# Patient Record
Sex: MALE | Race: WHITE | NOT HISPANIC OR LATINO
[De-identification: names, ages, dates, MRNs, and addresses within clinical notes are randomized per-mention and may not be internally consistent; named-entity substitution may affect disease eponyms.]

---

## 2021-05-02 ENCOUNTER — TRANSCRIPTION ENCOUNTER (OUTPATIENT)
Age: 41
End: 2021-05-02

## 2021-05-02 ENCOUNTER — INPATIENT (INPATIENT)
Facility: HOSPITAL | Age: 41
LOS: 8 days | Discharge: HOME | DRG: 58 | End: 2021-05-11
Attending: INTERNAL MEDICINE | Admitting: HOSPITALIST
Payer: COMMERCIAL

## 2021-05-02 VITALS
DIASTOLIC BLOOD PRESSURE: 75 MMHG | SYSTOLIC BLOOD PRESSURE: 126 MMHG | RESPIRATION RATE: 16 BRPM | WEIGHT: 149.91 LBS | TEMPERATURE: 99 F | OXYGEN SATURATION: 100 % | HEART RATE: 80 BPM | HEIGHT: 70 IN

## 2021-05-02 DIAGNOSIS — D72.819 DECREASED WHITE BLOOD CELL COUNT, UNSPECIFIED: ICD-10-CM

## 2021-05-02 DIAGNOSIS — D64.9 ANEMIA, UNSPECIFIED: ICD-10-CM

## 2021-05-02 DIAGNOSIS — Z29.9 ENCOUNTER FOR PROPHYLACTIC MEASURES, UNSPECIFIED: ICD-10-CM

## 2021-05-02 DIAGNOSIS — H55.00 UNSPECIFIED NYSTAGMUS: ICD-10-CM

## 2021-05-02 DIAGNOSIS — R25.3 FASCICULATION: ICD-10-CM

## 2021-05-02 DIAGNOSIS — C80.1 MALIGNANT (PRIMARY) NEOPLASM, UNSPECIFIED: ICD-10-CM

## 2021-05-02 DIAGNOSIS — E83.119 HEMOCHROMATOSIS, UNSPECIFIED: ICD-10-CM

## 2021-05-02 DIAGNOSIS — R53.1 WEAKNESS: ICD-10-CM

## 2021-05-02 LAB
ALBUMIN SERPL ELPH-MCNC: 4.7 G/DL — SIGNIFICANT CHANGE UP (ref 3.3–5)
ALP SERPL-CCNC: 55 U/L — SIGNIFICANT CHANGE UP (ref 40–120)
ALT FLD-CCNC: 22 U/L — SIGNIFICANT CHANGE UP (ref 10–45)
AMPHET UR-MCNC: NEGATIVE — SIGNIFICANT CHANGE UP
ANION GAP SERPL CALC-SCNC: 11 MMOL/L — SIGNIFICANT CHANGE UP (ref 5–17)
APPEARANCE UR: CLEAR — SIGNIFICANT CHANGE UP
APTT 50/50 2HOUR INCUB: 35.6 SEC — SIGNIFICANT CHANGE UP (ref 27.5–36.5)
APTT BLD: 34.4 SEC — SIGNIFICANT CHANGE UP (ref 27.5–36.5)
APTT BLD: 42.1 SEC — HIGH (ref 27.5–35.5)
APTT BLD: 43 SEC — HIGH (ref 27.5–35.5)
APTT BLD: 43 SEC — HIGH (ref 27.5–35.5)
AST SERPL-CCNC: 19 U/L — SIGNIFICANT CHANGE UP (ref 10–40)
B-OH-BUTYR SERPL-SCNC: 0.1 MMOL/L — SIGNIFICANT CHANGE UP
BACTERIA # UR AUTO: NEGATIVE — SIGNIFICANT CHANGE UP
BARBITURATES UR SCN-MCNC: NEGATIVE — SIGNIFICANT CHANGE UP
BASOPHILS # BLD AUTO: 0.01 K/UL — SIGNIFICANT CHANGE UP (ref 0–0.2)
BASOPHILS NFR BLD AUTO: 0.3 % — SIGNIFICANT CHANGE UP (ref 0–2)
BENZODIAZ UR-MCNC: NEGATIVE — SIGNIFICANT CHANGE UP
BILIRUB SERPL-MCNC: 0.7 MG/DL — SIGNIFICANT CHANGE UP (ref 0.2–1.2)
BILIRUB UR-MCNC: NEGATIVE — SIGNIFICANT CHANGE UP
BLD GP AB SCN SERPL QL: NEGATIVE — SIGNIFICANT CHANGE UP
BUN SERPL-MCNC: 13 MG/DL — SIGNIFICANT CHANGE UP (ref 7–23)
CALCIUM SERPL-MCNC: 9.4 MG/DL — SIGNIFICANT CHANGE UP (ref 8.4–10.5)
CHLORIDE SERPL-SCNC: 105 MMOL/L — SIGNIFICANT CHANGE UP (ref 96–108)
CO2 SERPL-SCNC: 20 MMOL/L — LOW (ref 22–31)
COCAINE METAB.OTHER UR-MCNC: NEGATIVE — SIGNIFICANT CHANGE UP
COLOR SPEC: SIGNIFICANT CHANGE UP
CREAT SERPL-MCNC: 1.03 MG/DL — SIGNIFICANT CHANGE UP (ref 0.5–1.3)
DAT POLY-SP REAG RBC QL: NEGATIVE — SIGNIFICANT CHANGE UP
DIFF PNL FLD: NEGATIVE — SIGNIFICANT CHANGE UP
EOSINOPHIL # BLD AUTO: 0.06 K/UL — SIGNIFICANT CHANGE UP (ref 0–0.5)
EOSINOPHIL NFR BLD AUTO: 1.9 % — SIGNIFICANT CHANGE UP (ref 0–6)
EPI CELLS # UR: 0 /HPF — SIGNIFICANT CHANGE UP
FERRITIN SERPL-MCNC: 2 NG/ML — LOW (ref 30–400)
FIBRINOGEN PPP-MCNC: 366 MG/DL — SIGNIFICANT CHANGE UP (ref 290–520)
FOLATE SERPL-MCNC: 14.9 NG/ML — SIGNIFICANT CHANGE UP
GLUCOSE SERPL-MCNC: 98 MG/DL — SIGNIFICANT CHANGE UP (ref 70–99)
GLUCOSE UR QL: NEGATIVE — SIGNIFICANT CHANGE UP
HAPTOGLOB SERPL-MCNC: 61 MG/DL — SIGNIFICANT CHANGE UP (ref 34–200)
HCT VFR BLD CALC: 22.9 % — LOW (ref 39–50)
HCT VFR BLD CALC: 26.2 % — LOW (ref 39–50)
HGB BLD-MCNC: 6.1 G/DL — CRITICAL LOW (ref 13–17)
HGB BLD-MCNC: 7 G/DL — CRITICAL LOW (ref 13–17)
HIV 1 & 2 AB SERPL IA.RAPID: SIGNIFICANT CHANGE UP
HIV 1+2 AB+HIV1 P24 AG SERPL QL IA: SIGNIFICANT CHANGE UP
HYALINE CASTS # UR AUTO: 0 /LPF — SIGNIFICANT CHANGE UP (ref 0–2)
IMM GRANULOCYTES NFR BLD AUTO: 0.3 % — SIGNIFICANT CHANGE UP (ref 0–1.5)
INR BLD: 1.08 RATIO — SIGNIFICANT CHANGE UP (ref 0.88–1.16)
INR BLD: 1.11 RATIO — SIGNIFICANT CHANGE UP (ref 0.88–1.16)
IRON SATN MFR SERPL: 13 UG/DL — LOW (ref 45–165)
IRON SATN MFR SERPL: 3 % — LOW (ref 16–55)
KETONES UR-MCNC: NEGATIVE — SIGNIFICANT CHANGE UP
LDH SERPL L TO P-CCNC: 138 U/L — SIGNIFICANT CHANGE UP (ref 50–242)
LEUKOCYTE ESTERASE UR-ACNC: NEGATIVE — SIGNIFICANT CHANGE UP
LYMPHOCYTES # BLD AUTO: 0.73 K/UL — LOW (ref 1–3.3)
LYMPHOCYTES # BLD AUTO: 23.5 % — SIGNIFICANT CHANGE UP (ref 13–44)
MCHC RBC-ENTMCNC: 13.5 PG — LOW (ref 27–34)
MCHC RBC-ENTMCNC: 14.5 PG — LOW (ref 27–34)
MCHC RBC-ENTMCNC: 26.6 GM/DL — LOW (ref 32–36)
MCHC RBC-ENTMCNC: 26.7 GM/DL — LOW (ref 32–36)
MCV RBC AUTO: 50.7 FL — LOW (ref 80–100)
MCV RBC AUTO: 54.2 FL — LOW (ref 80–100)
METHADONE UR-MCNC: NEGATIVE — SIGNIFICANT CHANGE UP
MONOCYTES # BLD AUTO: 0.27 K/UL — SIGNIFICANT CHANGE UP (ref 0–0.9)
MONOCYTES NFR BLD AUTO: 8.7 % — SIGNIFICANT CHANGE UP (ref 2–14)
NEUTROPHILS # BLD AUTO: 2.03 K/UL — SIGNIFICANT CHANGE UP (ref 1.8–7.4)
NEUTROPHILS NFR BLD AUTO: 65.3 % — SIGNIFICANT CHANGE UP (ref 43–77)
NITRITE UR-MCNC: NEGATIVE — SIGNIFICANT CHANGE UP
NRBC # BLD: 0 /100 WBCS — SIGNIFICANT CHANGE UP (ref 0–0)
NRBC # BLD: 0 /100 WBCS — SIGNIFICANT CHANGE UP (ref 0–0)
OB PNL STL: POSITIVE
OPIATES UR-MCNC: NEGATIVE — SIGNIFICANT CHANGE UP
OXYCODONE UR-MCNC: NEGATIVE — SIGNIFICANT CHANGE UP
PAT CTL 2H: 35.3 SEC — SIGNIFICANT CHANGE UP (ref 27.5–36.5)
PCP SPEC-MCNC: SIGNIFICANT CHANGE UP
PCP UR-MCNC: NEGATIVE — SIGNIFICANT CHANGE UP
PH UR: 6.5 — SIGNIFICANT CHANGE UP (ref 5–8)
PLATELET # BLD AUTO: 110 K/UL — LOW (ref 150–400)
PLATELET # BLD AUTO: 237 K/UL — SIGNIFICANT CHANGE UP (ref 150–400)
POTASSIUM SERPL-MCNC: 3.7 MMOL/L — SIGNIFICANT CHANGE UP (ref 3.5–5.3)
POTASSIUM SERPL-SCNC: 3.7 MMOL/L — SIGNIFICANT CHANGE UP (ref 3.5–5.3)
PROT SERPL-MCNC: 7.3 G/DL — SIGNIFICANT CHANGE UP (ref 6–8.3)
PROT UR-MCNC: NEGATIVE — SIGNIFICANT CHANGE UP
PROTHROM AB SERPL-ACNC: 12.9 SEC — SIGNIFICANT CHANGE UP (ref 10.6–13.6)
PROTHROM AB SERPL-ACNC: 13.3 SEC — SIGNIFICANT CHANGE UP (ref 10.6–13.6)
PT 100%: 13.3 SEC — SIGNIFICANT CHANGE UP (ref 10.6–13.6)
PT 50/50: 12.2 SEC — SIGNIFICANT CHANGE UP (ref 10.6–14.7)
RBC # BLD: 4.21 M/UL — SIGNIFICANT CHANGE UP (ref 4.2–5.8)
RBC # BLD: 4.52 M/UL — SIGNIFICANT CHANGE UP (ref 4.2–5.8)
RBC # BLD: 4.83 M/UL — SIGNIFICANT CHANGE UP (ref 4.2–5.8)
RBC # FLD: 22.9 % — HIGH (ref 10.3–14.5)
RBC # FLD: 27.5 % — HIGH (ref 10.3–14.5)
RBC CASTS # UR COMP ASSIST: 1 /HPF — SIGNIFICANT CHANGE UP (ref 0–4)
RETICS #: 30.3 K/UL — SIGNIFICANT CHANGE UP (ref 25–125)
RETICS/RBC NFR: 0.7 % — SIGNIFICANT CHANGE UP (ref 0.5–2.5)
RH IG SCN BLD-IMP: POSITIVE — SIGNIFICANT CHANGE UP
SARS-COV-2 RNA SPEC QL NAA+PROBE: SIGNIFICANT CHANGE UP
SODIUM SERPL-SCNC: 136 MMOL/L — SIGNIFICANT CHANGE UP (ref 135–145)
SP GR SPEC: 1.01 — SIGNIFICANT CHANGE UP (ref 1.01–1.02)
THC UR QL: NEGATIVE — SIGNIFICANT CHANGE UP
THROMBIN TIME: 24.9 SEC — SIGNIFICANT CHANGE UP (ref 16–25)
TIBC SERPL-MCNC: 481 UG/DL — HIGH (ref 220–430)
TRANSFERRIN SERPL-MCNC: 349 MG/DL — SIGNIFICANT CHANGE UP (ref 200–360)
UIBC SERPL-MCNC: 468 UG/DL — HIGH (ref 110–370)
URATE SERPL-MCNC: 4.9 MG/DL — SIGNIFICANT CHANGE UP (ref 3.4–8.8)
UROBILINOGEN FLD QL: NEGATIVE — SIGNIFICANT CHANGE UP
VIT B12 SERPL-MCNC: 363 PG/ML — SIGNIFICANT CHANGE UP (ref 232–1245)
WBC # BLD: 3.11 K/UL — LOW (ref 3.8–10.5)
WBC # BLD: 4.09 K/UL — SIGNIFICANT CHANGE UP (ref 3.8–10.5)
WBC # FLD AUTO: 3.11 K/UL — LOW (ref 3.8–10.5)
WBC # FLD AUTO: 4.09 K/UL — SIGNIFICANT CHANGE UP (ref 3.8–10.5)
WBC UR QL: 0 /HPF — SIGNIFICANT CHANGE UP (ref 0–5)

## 2021-05-02 PROCEDURE — 99285 EMERGENCY DEPT VISIT HI MDM: CPT

## 2021-05-02 PROCEDURE — ZZZZZ: CPT | Mod: NC

## 2021-05-02 PROCEDURE — 83020 HEMOGLOBIN ELECTROPHORESIS: CPT | Mod: 26

## 2021-05-02 PROCEDURE — 70450 CT HEAD/BRAIN W/O DYE: CPT | Mod: 26,MD

## 2021-05-02 PROCEDURE — 93010 ELECTROCARDIOGRAM REPORT: CPT

## 2021-05-02 PROCEDURE — 99223 1ST HOSP IP/OBS HIGH 75: CPT | Mod: GC

## 2021-05-02 RX ORDER — POLYETHYLENE GLYCOL 3350 17 G/17G
17 POWDER, FOR SOLUTION ORAL DAILY
Refills: 0 | Status: DISCONTINUED | OUTPATIENT
Start: 2021-05-02 | End: 2021-05-11

## 2021-05-02 RX ORDER — ACETAMINOPHEN 500 MG
650 TABLET ORAL EVERY 6 HOURS
Refills: 0 | Status: DISCONTINUED | OUTPATIENT
Start: 2021-05-02 | End: 2021-05-11

## 2021-05-02 RX ORDER — PANTOPRAZOLE SODIUM 20 MG/1
40 TABLET, DELAYED RELEASE ORAL
Refills: 0 | Status: DISCONTINUED | OUTPATIENT
Start: 2021-05-02 | End: 2021-05-03

## 2021-05-02 RX ADMIN — PANTOPRAZOLE SODIUM 40 MILLIGRAM(S): 20 TABLET, DELAYED RELEASE ORAL at 15:22

## 2021-05-02 NOTE — CONSULT NOTE ADULT - SUBJECTIVE AND OBJECTIVE BOX
Hematology Consult Note    HPI: 40 yoM, PMHx of germinoma s/p chemo/radiation around 2000, presenting with new LLE weakness for 5 days. Never had this sx before. Sx constant, not worsening. Most notable with walking and hip flexion. Had mild residual foot drop on L from prior mass. No headache, seizures, NV, sensory changes or other sx. No trauma, fall, fever or IVDA. Has not seen neuro in 2 years.    Found to have H/H of 6.1/22.9 for which hematology was consulted.      Allergies    No Known Allergies    Intolerances        MEDICATIONS  (STANDING):    MEDICATIONS  (PRN):      PAST MEDICAL & SURGICAL HISTORY:  Germinoma        FAMILY HISTORY:      SOCIAL HISTORY: No EtOH, no tobacco    REVIEW OF SYSTEMS:    CONSTITUTIONAL: No weakness, fevers or chills  EYES/ENT: No visual changes;  No vertigo or throat pain   NECK: No pain or stiffness  RESPIRATORY: No cough, wheezing, hemoptysis; No shortness of breath  CARDIOVASCULAR: No chest pain or palpitations  GASTROINTESTINAL: No abdominal or epigastric pain. No nausea, vomiting, or hematemesis; No diarrhea or constipation. No melena or hematochezia.  GENITOURINARY: No dysuria, frequency or hematuria  NEUROLOGICAL: LLE weakness  SKIN: No itching, burning, rashes, or lesions   All other review of systems is negative unless indicated above.    Height (cm): 177.8 (05-02 @ 11:08)  Weight (kg): 68 (05-02 @ 11:08)  BMI (kg/m2): 21.5 (05-02 @ 11:08)  BSA (m2): 1.85 (05-02 @ 11:08)    T(F): 98.3 (05-02-21 @ 11:48), Max: 98.6 (05-02-21 @ 11:08)  HR: 73 (05-02-21 @ 11:48)  BP: 117/64 (05-02-21 @ 11:48)  RR: 17 (05-02-21 @ 11:48)  SpO2: 100% (05-02-21 @ 11:48)  Wt(kg): --    Physical Exam  GENERAL: NAD, well-developed  HEAD:  Atraumatic, Normocephalic  EYES: PERRLA, conjunctiva and sclera clear  NECK: Supple, No JVD  CHEST/LUNG: Clear to auscultation bilaterally; No wheeze  HEART: Regular rate and rhythm; No murmurs, rubs, or gallops  ABDOMEN: Soft, Nontender, Nondistended; Bowel sounds present  EXTREMITIES:  2+ Peripheral Pulses, No clubbing, cyanosis, or edema  NEUROLOGY: non-focal  SKIN: No rashes or lesions                          6.1    3.11  )-----------( 237      ( 02 May 2021 12:00 )             22.9       05-02    136  |  105  |  13  ----------------------------<  98  3.7   |  20<L>  |  1.03    Ca    9.4      02 May 2021 12:00    TPro  7.3  /  Alb  4.7  /  TBili  0.7  /  DBili  x   /  AST  19  /  ALT  22  /  AlkPhos  55  05-02

## 2021-05-02 NOTE — CONSULT NOTE ADULT - SUBJECTIVE AND OBJECTIVE BOX
HPI: 40 year old male, PMH brain germinoma s/p chemo/rtx 2000 w/ serial MR surveillance showing resolution of tumor (last MRI 2012 within our EMR) presenting for LLE weakness. Patient states that the weakness started 5 days ago and has been constant. No pain or numbness/tingling is associated. At baseline, patient has a L foot drop that causes him to drag that foot while ambulating, for which he compensates by putting more weight on the other leg. He denies falls or lower back pain. Does not endorse any HA's. For work, patient is a  for [employer] and is always on his feet. He denies any work-related trauma. He denies blurry vision, diplopia, trouble w/ speech/swallowing, focal numbness/tingling, incontinence. He was brought in by his sister. Of note, patient states he had similar LLE weakness when germinoma was first discovered.       REVIEW OF SYSTEMS  Per HPI    PAST MEDICAL & SURGICAL HISTORY:  Germinoma      FAMILY HISTORY:    SOCIAL HISTORY:   T/E/D:   Occupation:   Lives with:     MEDICATIONS (HOME):  Home Medications:    MEDICATIONS  (STANDING):    MEDICATIONS  (PRN):    ALLERGIES/INTOLERANCES:  Allergies  No Known Allergies    Intolerances    VITALS & EXAMINATION:  Vital Signs Last 24 Hrs  T(C): 36.8 (02 May 2021 11:48), Max: 37 (02 May 2021 11:08)  T(F): 98.3 (02 May 2021 11:48), Max: 98.6 (02 May 2021 11:08)  HR: 73 (02 May 2021 11:48) (73 - 80)  BP: 117/64 (02 May 2021 11:48) (117/64 - 126/75)  BP(mean): --  RR: 17 (02 May 2021 11:48) (16 - 17)  SpO2: 100% (02 May 2021 11:48) (100% - 100%)    General:  Constitutional: Male, appears stated age, in no apparent distress including pain  Head: Normocephalic & atraumatic.    Neurological (>12):  MS: Awake, alert, oriented to person, place, situation, time. Normal affect. Follows all commands.    Language: Speech is clear, fluent with good repetition & comprehension    CNs: PERRLA (R = 3mm, L = 3mm). VFF. EOMI no nystagmus, no diplopia. V1-3 intact to LT. No facial asymmetry b/l. Hearing grossly normal to conversation. Symmetric palate elevation in midline. Gag reflex deferred. Tongue midline, normal movements, no atrophy.    Motor: Normal muscle bulk & tone. No noticeable tremor or seizure. No pronator drift. Negative SLR.               Deltoid	Biceps	Triceps	   R	5	5	5	5	 	  L	5	5	5	5	    	H-Flex	H-ABd	H-ADd	K-Flex	K-Ext	D-Flex	P-Flex  R	5	5	5	5	5	5	5	 	   L	4	4+	4+	5	5	5	5		     Sensation: Intact to LT/PP/Position b/l throughout.     Cortical: Extinction on DSS (neglect): none    Reflexes:              Biceps(C5)       BR(C6)     Triceps(C7)               Patellar(L4)    Plantar Resp  R	2	          2	             2		        3		    Down   L	2	          2	             2		        3		    Down     Coordination: No dysmetria to FTN    Gait: Postural instability seen after 3-4 steps with L lean and L foot drag.     LABORATORY:  CBC                       6.1    3.11  )-----------( 237      ( 02 May 2021 12:00 )             22.9     Chem 05-02    136  |  105  |  13  ----------------------------<  98  3.7   |  20<L>  |  1.03    Ca    9.4      02 May 2021 12:00    TPro  7.3  /  Alb  4.7  /  TBili  0.7  /  DBili  x   /  AST  19  /  ALT  22  /  AlkPhos  55  05-02    LFTs LIVER FUNCTIONS - ( 02 May 2021 12:00 )  Alb: 4.7 g/dL / Pro: 7.3 g/dL / ALK PHOS: 55 U/L / ALT: 22 U/L / AST: 19 U/L / GGT: x           Coagulopathy PT/INR - ( 02 May 2021 12:00 )   PT: 12.9 sec;   INR: 1.08 ratio         PTT - ( 02 May 2021 12:00 )  PTT:42.1 sec  Lipid Panel   A1c   Cardiac enzymes     U/A   CSF  Immunological  Other    STUDIES & IMAGING:  Studies (EKG, EEG, EMG, etc):     Radiology (XR, CT, MR, U/S, TTE/DINAH):  CT head: Mild sulcal prominence suggesting mild volume loss which is nonspecific but may be related to chronic illness or certain medications. No masses. No change since 3/29/2012. HPI: 40 year old male, PMH brain germinoma s/p chemo/rtx 2000 w/ serial MR surveillance showing resolution of tumor (last MRI 2012 within our EMR) presenting for LLE weakness. Patient states that the weakness started 5 days ago and has been constant. No pain or numbness/tingling is associated. At baseline, patient has a L foot drop that causes him to drag that foot while ambulating, for which he compensates by putting more weight on the other leg. He denies falls or lower back pain. Does not endorse any HA's. For work, patient is a  for [employer] and is always on his feet. He denies any work-related trauma. He denies blurry vision, diplopia, trouble w/ speech/swallowing, focal numbness/tingling, incontinence. He was brought in by his sister. Of note, patient states he had similar LLE weakness when germinoma was first discovered.       REVIEW OF SYSTEMS  CONSTITUTIONAL:   HEENT:  No visual loss, blurred vision, double vision.  No hearing loss, sneezing, congestion, runny nose or sore throat.  SKIN:  No rash or itching.  CARDIOVASCULAR:  No chest pain, chest pressure or chest discomfort. No palpitations or edema.  RESPIRATORY:  No shortness of breath, cough or sputum.  GASTROINTESTINAL:  No anorexia, nausea, vomiting or diarrhea. No abdominal pain.  GENITOURINARY:  NO dysuria. No increased frequency. No retention. No incontinence.  NEUROLOGICAL:  See HPI  MUSCULOSKELETAL:  No muscle, back pain, joint pain or stiffness.  HEMATOLOGIC:  No anemia, bleeding or bruising.  PSYCHIATRIC:    ENDOCRINOLOGIC:  No reports of sweating, cold or heat intolerance. No polyuria or polydipsia.    PAST MEDICAL & SURGICAL HISTORY:  Germinoma      FAMILY HISTORY: unknown    SOCIAL HISTORY:   T/E/D: nonsmoker, no ETOH   Occupation: works with [employer]  Lives with:     MEDICATIONS (HOME):  Home Medications: None    MEDICATIONS  (STANDING): None    MEDICATIONS  (PRN):    ALLERGIES/INTOLERANCES:  Allergies  No Known Allergies    Intolerances    VITALS & EXAMINATION:  Vital Signs Last 24 Hrs  T(C): 36.8 (02 May 2021 11:48), Max: 37 (02 May 2021 11:08)  T(F): 98.3 (02 May 2021 11:48), Max: 98.6 (02 May 2021 11:08)  HR: 73 (02 May 2021 11:48) (73 - 80)  BP: 117/64 (02 May 2021 11:48) (117/64 - 126/75)  BP(mean): --  RR: 17 (02 May 2021 11:48) (16 - 17)  SpO2: 100% (02 May 2021 11:48) (100% - 100%)    General:  Constitutional: Male, appears stated age, in no apparent distress including pain  Head: Normocephalic & atraumatic.  ext: no edema    Neurological (>12):  MS: Awake, alert, oriented to person, place, situation, time. Normal affect. Follows all commands.    Language: Speech is clear, fluent with good repetition & comprehension    CNs: PERRLA (R = 3mm, L = 3mm). VFF. EOMI no nystagmus, no diplopia. V1-3 intact to LT. No facial asymmetry b/l. Hearing grossly normal to conversation. Symmetric palate elevation in midline. Gag reflex deferred. Tongue midline, normal movements, no atrophy.    Motor: Normal muscle bulk & tone. No noticeable tremor or seizure. No pronator drift. Negative SLR.               Deltoid	Biceps	Triceps	   R	5	5	5	5	 	  L	5	5	5	5	    	H-Flex	H-ABd	H-ADd	K-Flex	K-Ext	D-Flex	P-Flex  R	5	5	5	5	5	5	5	 	   L	4	4+	4+	5	5	4	5		     Sensation: Intact to LT/PP/Position b/l throughout.     Cortical: Extinction on DSS (neglect): none    Reflexes:              Biceps(C5)       BR(C6)     Triceps(C7)               Patellar(L4)     ankle    Plantar Resp  R	2	          2	             2		        2		      1            Down   L	2	          2	             2		        3		       2          Down     Coordination: No dysmetria to FTN    Gait: Postural instability seen after 3-4 steps with L lean and L foot drag.     LABORATORY:  CBC                       6.1    3.11  )-----------( 237      ( 02 May 2021 12:00 )             22.9     Chem 05-02    136  |  105  |  13  ----------------------------<  98  3.7   |  20<L>  |  1.03    Ca    9.4      02 May 2021 12:00    TPro  7.3  /  Alb  4.7  /  TBili  0.7  /  DBili  x   /  AST  19  /  ALT  22  /  AlkPhos  55  05-02    LFTs LIVER FUNCTIONS - ( 02 May 2021 12:00 )  Alb: 4.7 g/dL / Pro: 7.3 g/dL / ALK PHOS: 55 U/L / ALT: 22 U/L / AST: 19 U/L / GGT: x           Coagulopathy PT/INR - ( 02 May 2021 12:00 )   PT: 12.9 sec;   INR: 1.08 ratio         PTT - ( 02 May 2021 12:00 )  PTT:42.1 sec  Lipid Panel   A1c   Cardiac enzymes     U/A   CSF  Immunological  Other    STUDIES & IMAGING:  Studies (EKG, EEG, EMG, etc):     Radiology (XR, CT, MR, U/S, TTE/DINAH):  CT head: Mild sulcal prominence suggesting mild volume loss which is nonspecific but may be related to chronic illness or certain medications. No masses. No change since 3/29/2012.

## 2021-05-02 NOTE — H&P ADULT - PROBLEM SELECTOR PLAN 4
- hx hemochromatosis requiring monthly phlebotomies (confirmed with PCP Dr. Ponce) - last phlebotomy 3 months ago  plan  - f/u iron panel

## 2021-05-02 NOTE — CONSULT NOTE ADULT - SUBJECTIVE AND OBJECTIVE BOX
DATE OF SERVICE: 05-02-21 @ 20:37    CHIEF COMPLAINT:Patient is a 40y old  Male who presents with a chief complaint of     HISTORY OF PRESENT ILLNESS:HPI:  Mr. Duran is a 41 y/o man with hx germinoma (brain) s/p chemo and radiation 2000, hx hemochromatosis (confirmed with PCP), has not followed up with doctors in 3 years presenting for L leg weakness, found to have hb of 6.1.     For the last week, he has whole L leg weakness. he had a L foot drop from germinoma hx but this has worsened. He is able to walk with difficulty. No pain. No urinary or fecal incontinence. No weakness in R leg or arms. No fevers/chills. No trauma/injuries.   He endorses hx of hemochromatosis and required routine phlebotomy, last time phlebotomy was 3 years ago. Has not seen a doctor in 3 years (says it's due to pandemic). Pt states his hb was ~11 few years ago when it was last checked.   He has not noticed any dark colored stools or blood in stool. No prior colonoscopy or endoscopy. No bleeding in urine, no hemoptysis/hematemesis. No sob/chest pain/dizziness/overall weakness.  FH + for thallasemia in his mother (unsure about 2 sister and father). Mother never required blood transfusions per pt. Pt received blood transfusion during chemo, nothing since.   He used to see neuro outpt for germinoma follow up and last got imaging ~3 years ago at [facility] - pt believes nothing was acutely abnormal at this time.     ROS: as above, no HA, vision changes, no chest pain/sob, no abdominal pain. last bm 2 days ago, urinating without problems. no rash.     PMH: germinoma (tx with chemo and radiation in 8806-1149), hemachromatosis (phlebotomy).   Surgeries: none  Medications: NONE. No supplements.   NO smoking/alcohol/drugs per pt.   Lives at home alone, independent ADLs.    (02 May 2021 12:43)      PAST MEDICAL & SURGICAL HISTORY:  Germinoma    Hemochromatosis      MEDICATIONS:      acetaminophen   Tablet .. 650 milliGRAM(s) Oral every 6 hours PRN  pantoprazole  Injectable 40 milliGRAM(s) IV Push two times a day  polyethylene glycol 3350 17 Gram(s) Oral daily PRN      FAMILY HISTORY:      Non-contributory    SOCIAL HISTORY:    not a smoker    Allergies    No Known Allergies    Intolerances    	    REVIEW OF SYSTEMS:  CONSTITUTIONAL: No fever  EYES: No eye pain, visual disturbances, or discharge  ENMT:  No difficulty hearing, tinnitus  NECK: No pain or stiffness  RESPIRATORY: No cough, wheezing,  CARDIOVASCULAR: No chest pain, palpitations, passing out, dizziness, or leg swelling  GASTROINTESTINAL:  No nausea, vomiting, diarrhea or constipation. No melena.  GENITOURINARY: No dysuria, hematuria  NEUROLOGICAL: No stroke like symptoms  SKIN: No burning or lesions   ENDOCRINE: No heat or cold intolerance  MUSCULOSKELETAL: No joint pain or swelling, left foot pain  PSYCHIATRIC: No  anxiety, mood swings  HEME/LYMPH: No bleeding gums  ALLERGY AND IMMUNOLOGIC: No hives or eczema	    All other ROS negative    PHYSICAL EXAM:  T(C): 36.4 (05-02-21 @ 19:37), Max: 37 (05-02-21 @ 11:08)  HR: 66 (05-02-21 @ 19:37) (66 - 80)  BP: 114/70 (05-02-21 @ 19:37) (108/66 - 126/75)  RR: 18 (05-02-21 @ 19:37) (16 - 18)  SpO2: 100% (05-02-21 @ 19:37) (97% - 100%)  Wt(kg): --  I&O's Summary      Appearance: Normal	  HEENT:   Normal oral mucosa, EOMI	  Cardiovascular:  S1 S2, No JVD,    Respiratory: Lungs clear to auscultation	  Psychiatry: Alert  Gastrointestinal:  Soft, Non-tender, + BS	  Skin: No rashes   Neurologic: Non-focal  Extremities:  No edema  Vascular: Peripheral pulses palpable    	    	  	  CARDIAC MARKERS:  Labs personally reviewed by me                                  6.1    3.11  )-----------( 237      ( 02 May 2021 12:00 )             22.9     05-02    136  |  105  |  13  ----------------------------<  98  3.7   |  20<L>  |  1.03    Ca    9.4      02 May 2021 12:00    TPro  7.3  /  Alb  4.7  /  TBili  0.7  /  DBili  x   /  AST  19  /  ALT  22  /  AlkPhos  55  05-02          EKG: Personally reviewed by me -  none in chart      Assessment /Plan:   Assessment:  · Assessment	  Mr. Duran is a 41 y/o man with hx germinoma (brain) s/p chemo and radiation 2000, hx hemochromatosis requiring phlebotomies, has not followed up with doctors in 3 years presenting for L leg weakness/worsening foot drop pending MRI brain/spine to r/o cord compression/recurrence of germinoma, found to be microcytic anemic hb 6.1 s/p 1U PRBC 5/2 - iron panel concerning for GI malignancy/bleed vs myelodysplastic syndrome vs thalassemia (+ family hx).     FULL CODE  neuro exam - A&O x3, responds to all commands. +nystagmus, tongue fasciculations L foot drop, mild LLE weakness.     Problem/Plan - 1:   ·  Problem: SOB.  Plan: Overall weakness and SOB 2/2 significant anemia of 6.1  - Transfuse PRBC to keep hb >7  - obtain EKG    Problem/Plan - 2:  ·  Problem: Anemia.  Plan: - admitted with hb 6.1  - clarified hemochromatosis Hx with PCP, never had anemia prior from it  - on the contrary hx hemochromatosis requiring phlebotomy every month for few years, last done ~3 years ago per pt.   - etiology of present anemia: denies melena/hematoschezia, no hemoptysis/hematemesis, no hematuria. He is FOBT positive  - ? chronic slow GI blood loss anemia over   - Heme consult appreciated  - GI consult pending   - f/u hemoglobin electropheresis  - f/u iron panel  - f/u haptoglobin/LDH/B12/folate    Problem/Plan - 3:  ·  Problem: Germinoma.  Plan: - hx germinoma (brain) dx in 2000 s/p chemo/radiation in [id number]. dx when he presented with L foot drop/weakness.   - Heme/onc follow up   - MRI brain as above   - heme onc on board - f/u additional recs.     Problem/Plan - 4:  ·  Problem: Hemochromatosis.  Plan: - hx hemochromatosis requiring monthly phlebotomies (confirmed with PCP Dr. Ponce) - last phlebotomy 3 months ago  plan  - f/u iron panel.     Problem/Plan - 5:  ·  Problem: Leukopenia.  Plan: leukopenia with wbc 3.11. diff wnl.   - possible bone marrow suppression vs malignancy. afebrile, no infection sx including congestion/cough/fevers/chills/diarrhea. less likely infection.   plan  - CTM daily CBC  - f/u heme onc recs.     Problem/Plan - 6:  Problem: Fasciculation of tongue. Plan: - fasciulation of tongue observed on exam  - etiology: benign, ALS.    Problem/Plan - 7:  ·  Problem: Nystagmus.  Plan: - horizontal nystagmus with lateral eye movements - pt states has had before, is not sure cause.   - etiology: cerebellum lesion though no dysmetria and no dyskinesia.     Problem/Plan - 8:  ·  Problem: Prophylactic measure.  Plan: DVT ppx - SCD pending heme studies  diet - regular.         Differential diagnosis and plan of care discussed with patient after the evaluation. Counseling on diet, nutritional counseling, weight management, exercise and medication compliance was done. OMT on six regions for acute somatic dysfunctions done at the bedside. One hundred ten minutes spent on total encounter, of which more than fifty percent of the encounter was spent counseling and/or coordinating care by the attending physician        Alejandro Smith DO [facility]  Cardiovascular Medicine  [address] [place] [address] Dr, [address]  Office [phone]  Cell [phone]

## 2021-05-02 NOTE — H&P ADULT - NSHPPHYSICALEXAM_GEN_ALL_CORE
PHYSICAL EXAM: NOT COMPLETED YET.   GENERAL: NAD, lying in bed comfortably  HEAD:  Atraumatic, Normocephalic  EYES: EOMI, PERRLA, conjunctiva and sclera clear  ENT: Moist mucous membranes  NECK: Supple, No JVD  CHEST/LUNG: Clear to auscultation bilaterally; No rales, rhonchi, wheezing, or rubs. Unlabored respirations  HEART: Regular rate and rhythm; No murmurs, rubs, or gallops  ABDOMEN: Bowel sounds present; Soft, Nontender, Nondistended. No hepatomegally  EXTREMITIES:  2+ Peripheral Pulses, brisk capillary refill. No clubbing, cyanosis, or edema  NERVOUS SYSTEM:  Alert & Oriented X3, speech clear. No deficits   MSK: FROM all 4 extremities, full and equal strength  SKIN: No rashes or lesions GENERAL: lying down, appears comfortable in no distress  HEAD:  Atraumatic, Normocephalic  EYES: extra ocular movements intact, horizontal nystagmus on lateral gaze  ENT: Moist mucous membranes  NECK: Supple  CHEST/LUNG: clear to auscultation bilaterally, no crackles/wheeze/ronchi  HEART: regular rate and rhythm, no murmurs  ABDOMEN: nontender abdomen, no masses palpated. no rebound/guarding.   EXTREMITIES:  no edema. able to move all extremities. no rashes.   NERVOUS SYSTEM: A&O x3, CN2-12 - horizontal nystagmus with lateral gaze bilaterally but EOMI , fasciculations in tongue +, otherwise CN2-12 wnl. 5/5  str bilateral UE. no dysmetria, no dyskinesia. no tremors at rest or action.   - GAIT: prominent L foot drop, dragging L foot with each step.   MSK: FROM all 4 extremities, full and equal strength  SKIN: No rashes or lesions GENERAL: lying down, appears comfortable in no distress  HEAD:  Atraumatic, Normocephalic  EYES: extra ocular movements intact, horizontal nystagmus on lateral gaze  ENT: Moist mucous membranes  NECK: Supple  CHEST/LUNG: clear to auscultation bilaterally, no crackles/wheeze/ronchi  HEART: regular rate and rhythm, no murmurs  ABDOMEN: nontender abdomen, no masses palpated. no rebound/guarding.   EXTREMITIES:  no edema. able to move all extremities. no rashes.   NERVOUS SYSTEM: A&O x3, CN2-12 - horizontal nystagmus with lateral gaze bilaterally but EOMI , fasciculations in tongue +, otherwise CN2-12 wnl. 5/5  str bilateral UE. no dysmetria, no dyskinesia. no tremors at rest or action. no pronator drift, neg romberg.   - GAIT: prominent L foot drop, dragging L foot with each step.   MSK: FROM all 4 extremities, full and equal strength  SKIN: No rashes or lesions

## 2021-05-02 NOTE — ED PROVIDER NOTE - CLINICAL SUMMARY MEDICAL DECISION MAKING FREE TEXT BOX
Gildardo, PGY3- new LLE weakness for 5 days in setting of remote treated germinoma. S/p chemo/radiation 2000. No other sx. No trauma, fall, pain, fever, or IVDA. Has not seen neuro in 2 years.   Notable weakness LLE with hip flexion, unsteady gait.  Concern for recurrence of mass. No consistent with CVA. Could be spinal cord pathology but no RF for epidural abscess/hematoma or pain to suggest disc disease/fx.   Will obtain CTH and neuro consult. Likely need MRI/further workup. Gildardo, PGY3- new LLE weakness for 5 days in setting of remote treated germinoma. S/p chemo/radiation 2000. No other sx. No trauma, fall, pain, fever, or IVDA. Has not seen neuro in 2 years.   Notable weakness LLE with hip flexion, unsteady gait.  Concern for recurrence of mass. Not consistent with CVA. Could be spinal cord pathology but no RF for epidural abscess/hematoma or pain to suggest disc disease/fx.   Will obtain CTH and neuro consult. Likely need MRI/further workup.

## 2021-05-02 NOTE — H&P ADULT - PROBLEM SELECTOR PLAN 1
- admitted with hb 6.1 (f/u baseline from PCP), MCV 50.7. RDW 22.9. smear with moderate hypochromia. PTT elevated to 42.1  - bleeding source: - admitted with hb 6.1 (f/u baseline from PCP), MCV 50.7. RDW 22.9. smear with moderate hypochromia, slight target cells but heme/onc to re-examine smear. PTT elevated to 42.1.   - per pt, mother with hemochromatosis but never needed blood transfusion. [place] heritage. pt required blood transfusions during chemo. hx hemochromatosis requiring phlebotomy every month for few years (confirmed with PCP), last done ~3 years ago per pt.   - etiology of present anemia: no bleeding reported by pt (no melena/hematoschezia, no hemoptysis/hematemesis, no hematuria). severe microcytic anemia concerning for thalassemia though anemia is new (no records available per PCP given pt has not followed up in few years). Elevated RDW possible iron deficiency but pt has hx hemochromatosis. Possible bone marrow suppression. Possible RP hemorrhage - if other labs suggestive for bleeding rather than thallasemia then will consider obtaining CT abdomen to r/o RP hemorrhage.   plan  - f/u hemoglobin electropheresis  - f/u iron panel  - f/u haptoglobin/LDH/B12/folate  - f/u HIV - admitted with hb 6.1 (f/u baseline from PCP), MCV 50.7. RDW 22.9. smear with moderate hypochromia, slight target cells but heme/onc to re-examine smear. PTT elevated to 42.1.   - per pt, mother with hemochromatosis but never needed blood transfusion. [place] heritage. pt required blood transfusions during chemo. hx hemochromatosis requiring phlebotomy every month for few years (confirmed with PCP), last done ~3 years ago per pt.   - etiology of present anemia: no bleeding reported by pt (no melena/hematoschezia, no hemoptysis/hematemesis, no hematuria). severe microcytic anemia concerning for thalassemia though anemia is new (no records available per PCP given pt has not followed up in few years). Elevated RDW possible iron deficiency but pt has hx hemochromatosis. Possible bone marrow suppression. Possible RP hemorrhage - if other labs suggestive for bleeding rather than thallasemia then will consider obtaining CT abdomen to r/o RP hemorrhage.   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 - admitted with hb 6.1 (f/u baseline from PCP), MCV 50.7. RDW 22.9. smear with moderate hypochromia, slight target cells but heme/onc to re-examine smear. PTT elevated to 42.1.   - per pt, mother with thalassemia but never needed blood transfusion. Urdu heritage. pt required blood transfusions during chemo. hx hemochromatosis requiring phlebotomy every month for few years (confirmed with PCP), last done ~3 years ago per pt.   - etiology of present anemia: no bleeding reported by pt (no melena/hematoschezia, no hemoptysis/hematemesis, no hematuria). severe microcytic anemia concerning for thalassemia though anemia is new (no records available per PCP given pt has not followed up in few years). Elevated RDW possible iron deficiency but pt has hx hemochromatosis. Possible bone marrow suppression. Possible RP hemorrhage - if other labs suggestive for bleeding rather than thallasemia then will consider obtaining CT abdomen to r/o RP hemorrhage.   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

## 2021-05-02 NOTE — ED PROVIDER NOTE - PROGRESS NOTE DETAILS
Gildardo, PGY3- call with Dr. Ponce. Agree with [facility], neuro consult. Gildardo, PGY3- seen by neuro. CTH without obvious lesion. Found to be anemic to 6. Hx of thalassemia in family, baseline possible 11? Additional labs sent. Heme to see. FOBT sent (patricia TURNER RN). Admitted to Dr. Giraldo. Will hold off on PRBC at this time. Dr. Ponce aware of findings.

## 2021-05-02 NOTE — H&P ADULT - ASSESSMENT
-  Mr. Duran is a 41 y/o man with hx germinoma (brain) s/p chemo and radiation 2000, hx hemochromatosis requiring phlebotomies, has not followed up with doctors in 3 years presenting for L leg weakness/worsening foot drop pending MRI brain/spine to r/o cord compression/recurrence of germinoma, found to be microcytic anemic hb 6.1 s/p 1U PRBC 5/2 - iron panel concerning for GI malignancy/bleed vs myelodysplastic syndrome vs thalassemia (+ family hx).     FULL CODE  neuro exam - A&O x3, responds to all commands. +nystagmus, tongue fasciculations L foot drop, mild LLE weakness.

## 2021-05-02 NOTE — ED PROVIDER NOTE - OBJECTIVE STATEMENT
40 yoM, PMHx of germinoma s/p chemo/radiation around 2000, presenting with new LLE weakness for 5 days. Never had this sx before. Sx constant, not worsening. Most notable with walking and hip flexion. Had mild residual foot drop on L from prior mass. No headache, seizures, NV, sensory changes or other sx. No trauma, fall, fever or IVDA. Has not seen neuro in 2 years. PCP: Kris.

## 2021-05-02 NOTE — H&P ADULT - PROBLEM SELECTOR PLAN 8
DVT pxx - SCD pending heme studies  diet - regular DVT ppx - SCD pending heme studies  diet - regular

## 2021-05-02 NOTE — ED ADULT NURSE REASSESSMENT NOTE - NS ED NURSE REASSESS COMMENT FT1
Pt. hemoglobin 6.1. Pt. denies weakness, dizziness, lightheadedness, melena. Guaiac performed at bedside by MD Ewing with RN present. Safety and comfort maintained.

## 2021-05-02 NOTE — H&P ADULT - ATTENDING COMMENTS
40 year old male with PMH brain germinoma s/p chemotherapy and radiation in 2000 (tumor has since resolved) and hemochromatosis (no phlebotomy for last 3 years) who presents for L leg weakness. The patient states that since his germinoma, he has had some L foot drop at baseline but noticed that for the past week, it has worsened. He feels as though he is dragging his foot more. He had a CT head in the ER which showed no acute abnormality and neurology was consulted-final recommendations are pending. Additionally, he was found to have a new microcytic anemia with hemoglobin 6.1 and MCV 50.7. He does endorse a family history of thalassemia but states that his baseline hemoglobin is around 12. His FOBT also came back positive although he denies any overt signs of bleeding. Heme/onc was consulted and is recommending further labwork such as iron studies, hemolysis labs, etc which are pending. He will be transfused 1 unit PRBC and post transfusion CBC will be drawn. Additionally, due to his positive FOBT, GI will be consulted. Rest of plan as above.    Ray Convissar, DO  Pager [phone]  If no answer [phone]

## 2021-05-02 NOTE — ED PROVIDER NOTE - PHYSICAL EXAMINATION
General: alert, conversant, looks well, vitals reassuring  Head: atraumatic, normocephalic  Eyes: PERRL, EOMI, no scleral icterus  ENT: no epistaxis, normal phonation, airway patent  Neck: full ROM, no midline ttp  CV: RRR, no murmurs, HDS  Pulm: lungs CTA b/l, no wheezing, no respiratory distress  GI: abd soft, non tender, no guarding/rebound/masses  Back: normal ROM, no midline ttp, no signs of trauma  Extremities: normal ROM, joints stable, distal pulses intact, no edema  Neuro: awake, alert, PERRL, 4/5 strength on LLE with hip flexion, no sensory changes. Normal strength RLE/BUE. No speech changes. No facial droop. PERRL. Gait unsteady.   Derm: warm, dry, normal color, no rash/wounds

## 2021-05-02 NOTE — H&P ADULT - NSHPLABSRESULTS_GEN_ALL_CORE
LABS:                        6.1    3.11  )-----------( 237      ( 02 May 2021 12:00 )             22.9     02 May 2021 12:00    136    |  105    |  13     ----------------------------<  98     3.7     |  20     |  1.03     Ca    9.4        02 May 2021 12:00    TPro  7.3    /  Alb  4.7    /  TBili  0.7    /  DBili  x      /  AST  19     /  ALT  22     /  AlkPhos  55     02 May 2021 12:00    PT/INR - ( 02 May 2021 12:00 )   PT: 12.9 sec;   INR: 1.08 ratio         PTT - ( 02 May 2021 12:00 )  PTT:42.1 sec  CAPILLARY BLOOD GLUCOSE        BLOOD CULTURE    RADIOLOGY & ADDITIONAL TESTS:    Imaging Personally Reviewed:  [ ] YES

## 2021-05-02 NOTE — H&P ADULT - PROBLEM SELECTOR PLAN 5
leukopenia with wbc 3.11. diff wnl.   - possible bone marrow suppression vs malignancy. afebrile, no infection sx including congestion/cough/fevers/chills/diarrhea. less likely infection.   plan  - CTM daily CBC  - f/u heme onc recs

## 2021-05-02 NOTE — ED ADULT NURSE NOTE - OBJECTIVE STATEMENT
40 y.o M A&Ox3 M A&Ox3 with PMH germinoma s/p chemo/radiation presents to the ED c/o sudden onset of LLE weakness x5 days. Pt. reports symptoms presented about 5 days ago, denies any recent traumas or injuries. Pt. reports he has been having difficulty ambulating due to weakness. LLE weaker to RLE. Denies LUE weakness. Denies numbness/tingling sensation, HA, back pain, vision changes, fevers, chills, dizziness, swelling/redness, calf pain at this time. Otherwise gross neuro intact. PERRL- 2 mm bilaterally. Pt. is speaking in full coherent sentences and following commands without difficulty. Safety and comfort provided.

## 2021-05-02 NOTE — CONSULT NOTE ADULT - ATTENDING COMMENTS
41 y/o man h/o brain germinoma s/p resection with chronic residual left foot drop, p/w 5 days of leg weakness and difficulty bearing weight on the left side. On exam pt has left HF weakness, DF weakness which is c/w with pt's hx. KF/KE, abd/add, inversion/eversion all 5/5. Py describes numbness over the top of the left foot, but on objective exam there is no sensory deficits. Asymmetric reflexes of patellar and ankles L>R.     R/o lumbar radiculopathy, pending MR lumbar spine.

## 2021-05-02 NOTE — H&P ADULT - PROBLEM SELECTOR PLAN 7
- horizontal nystagmus with lateral eye movements - pt states has had before, is not sure cause.   - etiology: - horizontal nystagmus with lateral eye movements - pt states has had before, is not sure cause.   - etiology: cerebellum lesion though no dysmetria and no dyskinesia.

## 2021-05-02 NOTE — H&P ADULT - HISTORY OF PRESENT ILLNESS
Mr. Duran is a 39 y/o man with hx germinoma  Mr. Duran is a 41 y/o man with hx germinoma (brain) s/p chemo and radiation 2010, reported hx hemochromatosis with blood taken out routinely (last 3 years ago), has not followed up with doctors in 3 years presenting for L leg weakness. For the last week, he has whole L leg weakness. he had a L foot drop from prior germinoma but has worsened.  Mr. Duran is a 41 y/o man with hx germinoma (brain) s/p chemo and radiation 2010, reported hx hemochromatosis with blood taken out routinely (last 3 years ago), has not followed up with doctors in 3 years presenting for L leg weakness. For the last week, he has whole L leg weakness. he had a L foot drop from prior germinoma but has worsened. No urinary or fecal incontinence. No weakness in R leg. No fevers/chills.   He endorses hx of hemochromatosis and required blood removed routinely in past, last time blood was removed Has not seen a doctor in 3 years (says it's due to pandemic).  Mr. Duran is a 39 y/o man with hx germinoma (brain) s/p chemo and radiation 2000, reported hx hemochromatosis with blood taken out routinely (last 3 years ago), has not followed up with doctors in 3 years presenting for L leg weakness. For the last week, he has whole L leg weakness. he had a L foot drop from prior germinoma but has worsened. No urinary or fecal incontinence. No weakness in R leg. No fevers/chills.   He endorses hx of hemochromatosis and required blood removed routinely in past, last time blood was removed Has not seen a doctor in 3 years (says it's due to pandemic). Pt states his hb was ~11 few years ago when it was last checked.   He has not noticed any dark colored stools or blood in stool. No bleeding in urine, no hemoptysis/hematemesis. No trauma/injuries. No sob/chest pain/dizziness/overall weakness.  FH + for thallasemia in his mother (unsure about 2 sister and father). Mother never required blood transfusions per pt. Pt received blood transfusion during chemo, nothing since.   He used to see neuro outpt for germinoma follow up and last got imaging ~3 years ago at [facility] - pt believes nothing was acutely abnormal at this time.     PMH: germinoma (tx with chemo and radiation in 3784-5249), hemachromatosis.   Surgeries: none  Medications: NONE. No supplements.   NO smoking/alcohol/drugs per pt.   Lives at home alone, independent ADLs.    Mr. Duran is a 39 y/o man with hx germinoma (brain) s/p chemo and radiation 2000, reported hx hemochromatosis with blood taken out routinely (last 3 years ago), has not followed up with doctors in 3 years presenting for L leg weakness found to have hb of 6.1. For the last week, he has whole L leg weakness. he had a L foot drop from prior germinoma but has worsened. He is able to walk with difficulty. No pain. No urinary or fecal incontinence. No weakness in R leg. No fevers/chills.   He endorses hx of hemochromatosis and required phlebotomy, last time phlebotomy was 3 years ago. Has not seen a doctor in 3 years (says it's due to pandemic). Pt states his hb was ~11 few years ago when it was last checked.   He has not noticed any dark colored stools or blood in stool. No prior colonoscopy or endoscopy. No bleeding in urine, no hemoptysis/hematemesis. No trauma/injuries. No sob/chest pain/dizziness/overall weakness.  FH + for thallasemia in his mother (unsure about 2 sister and father). Mother never required blood transfusions per pt. Pt received blood transfusion during chemo, nothing since.   He used to see neuro outpt for germinoma follow up and last got imaging ~3 years ago at [facility] - pt believes nothing was acutely abnormal at this time.     PMH: germinoma (tx with chemo and radiation in 4371-5300), hemachromatosis (phlebotomy).   Surgeries: none  Medications: NONE. No supplements.   NO smoking/alcohol/drugs per pt.   Lives at home alone, independent ADLs.    Mr. Duran is a 39 y/o man with hx germinoma (brain) s/p chemo and radiation 2000, reported hx hemochromatosis, has not followed up with doctors in 3 years presenting for L leg weakness, found to have hb of 6.1.     For the last week, he has whole L leg weakness. he had a L foot drop from germinoma hx but this has worsened. He is able to walk with difficulty. No pain. No urinary or fecal incontinence. No weakness in R leg or arms. No fevers/chills. No trauma/injuries.   He endorses hx of hemochromatosis and required routine phlebotomy, last time phlebotomy was 3 years ago. Has not seen a doctor in 3 years (says it's due to pandemic). Pt states his hb was ~11 few years ago when it was last checked.   He has not noticed any dark colored stools or blood in stool. No prior colonoscopy or endoscopy. No bleeding in urine, no hemoptysis/hematemesis. No sob/chest pain/dizziness/overall weakness.  FH + for thallasemia in his mother (unsure about 2 sister and father). Mother never required blood transfusions per pt. Pt received blood transfusion during chemo, nothing since.   He used to see neuro outpt for germinoma follow up and last got imaging ~3 years ago at [facility] - pt believes nothing was acutely abnormal at this time.     ROS: as above, no HA, vision changes,     PMH: germinoma (tx with chemo and radiation in 1098-7274), hemachromatosis (phlebotomy).   Surgeries: none  Medications: NONE. No supplements.   NO smoking/alcohol/drugs per pt.   Lives at home alone, independent ADLs.    Mr. Duran is a 39 y/o man with hx germinoma (brain) s/p chemo and radiation 2000, reported hx hemochromatosis, has not followed up with doctors in 3 years presenting for L leg weakness, found to have hb of 6.1.     For the last week, he has whole L leg weakness. he had a L foot drop from germinoma hx but this has worsened. He is able to walk with difficulty. No pain. No urinary or fecal incontinence. No weakness in R leg or arms. No fevers/chills. No trauma/injuries.   He endorses hx of hemochromatosis and required routine phlebotomy, last time phlebotomy was 3 years ago. Has not seen a doctor in 3 years (says it's due to pandemic). Pt states his hb was ~11 few years ago when it was last checked.   He has not noticed any dark colored stools or blood in stool. No prior colonoscopy or endoscopy. No bleeding in urine, no hemoptysis/hematemesis. No sob/chest pain/dizziness/overall weakness.  FH + for thallasemia in his mother (unsure about 2 sister and father). Mother never required blood transfusions per pt. Pt received blood transfusion during chemo, nothing since.   He used to see neuro outpt for germinoma follow up and last got imaging ~3 years ago at [facility] - pt believes nothing was acutely abnormal at this time.     ROS: as above, no HA, vision changes, no chest pain/sob, no abdominal pain. last bm 2 days ago, urinating without problems. no rash.     PMH: germinoma (tx with chemo and radiation in [id number]), hemachromatosis (phlebotomy).   Surgeries: none  Medications: NONE. No supplements.   NO smoking/alcohol/drugs per pt.   Lives at home alone, independent ADLs.    Mr. Duran is a 41 y/o man with hx germinoma (brain) s/p chemo and radiation 2000, hx hemochromatosis (confirmed with PCP), has not followed up with doctors in 3 years presenting for L leg weakness, found to have hb of 6.1.     For the last week, he has whole L leg weakness. he had a L foot drop from germinoma hx but this has worsened. He is able to walk with difficulty. No pain. No urinary or fecal incontinence. No weakness in R leg or arms. No fevers/chills. No trauma/injuries.   He endorses hx of hemochromatosis and required routine phlebotomy, last time phlebotomy was 3 years ago. Has not seen a doctor in 3 years (says it's due to pandemic). Pt states his hb was ~11 few years ago when it was last checked.   He has not noticed any dark colored stools or blood in stool. No prior colonoscopy or endoscopy. No bleeding in urine, no hemoptysis/hematemesis. No sob/chest pain/dizziness/overall weakness.  FH + for thallasemia in his mother (unsure about 2 sister and father). Mother never required blood transfusions per pt. Pt received blood transfusion during chemo, nothing since.   He used to see neuro outpt for germinoma follow up and last got imaging ~3 years ago at [facility] - pt believes nothing was acutely abnormal at this time.     ROS: as above, no HA, vision changes, no chest pain/sob, no abdominal pain. last bm 2 days ago, urinating without problems. no rash.     PMH: germinoma (tx with chemo and radiation in [id number]), hemachromatosis (phlebotomy).   Surgeries: none  Medications: NONE. No supplements.   NO smoking/alcohol/drugs per pt.   Lives at home alone, independent ADLs.

## 2021-05-02 NOTE — H&P ADULT - PROBLEM SELECTOR PLAN 2
- 1wk of L leg weakness with worse foot drop and gait difficulty. foot drop present on gait exam. pt had L foot drop since germinoma in 2000 and had high steppage but over last week does not have strength for this and dragging L foot.    - concerning for cord compression in setting of germinoma with lost to follow up for few years without imaging. no incontinence, weakness of R leg.   plan  - f/u MRI lumbar spine. f/u MRI brain for recurrence of germinoma/ other lesions. - 1wk of L leg weakness with worse foot drop and gait difficulty. foot drop present on gait exam. pt had L foot drop since germinoma in 2000 and had high steppage but over last week does not have strength for this and dragging L foot.    - concerning for cord compression in setting of germinoma with lost to follow up for few years without imaging. no incontinence, weakness of R leg.   plan  - f/u MRI lumbar spine. f/u MRI brain for recurrence of germinoma/ other lesions.  - neuro was consulted in ED - f/u. - 1wk of L leg weakness with worse foot drop and gait difficulty. foot drop present on gait exam. pt had L foot drop since germinoma in 2000 and had high steppage but over last week does not have strength for this and dragging L foot.    - concerning for cord compression in setting of germinoma with lost to follow up for few years without imaging. no incontinence, weakness of R leg. possible ALS.   plan  - f/u MRI lumbar spine. f/u MRI brain for recurrence of germinoma/ other lesions.  - neuro was consulted in ED - f/u.

## 2021-05-02 NOTE — H&P ADULT - PROBLEM SELECTOR PLAN 6
- fasciulation of tongue observed on exam  - etiology: ALS - fasciulation of tongue observed on exam  - etiology: benign, ALS

## 2021-05-02 NOTE — PATIENT PROFILE ADULT - FLU SEASON?
INTERVAL HPI/OVERNIGHT EVENTS/SUBJECTIVE: 87yo female examined at bedside, pt was code stroke overnight, nurse stated that pt was aphasic as well as not following commands. Pt is unable to communicate at this time, appears to be comfortable NAD. Pt is now NPO as per code stroke protocol. Dr Carrera Neurology is following.     ICU Vital Signs Last 24 Hrs  T(C): 36.8 (02 Dec 2017 07:57), Max: 37.7 (01 Dec 2017 20:49)  T(F): 98.3 (02 Dec 2017 07:57), Max: 99.9 (01 Dec 2017 20:49)  HR: 112 (02 Dec 2017 08:19) (80 - 119)  BP: 145/95 (02 Dec 2017 07:21) (136/62 - 159/75)  BP(mean): 81 (01 Dec 2017 12:54) (81 - 81)  ABP: --  ABP(mean): --  RR: 24 (02 Dec 2017 07:21) (22 - 28)  SpO2: 100% (02 Dec 2017 08:19) (93% - 100%)        MEDICATIONS  (STANDING):  ALBUTerol/ipratropium for Nebulization 3 milliLiter(s) Nebulizer every 6 hours  alvimopan 12 milliGRAM(s) Oral two times a day  amylase/lipase/protease  (CREON  6,000 Units) 1 Capsule(s) Oral three times a day with meals  ascorbic acid 500 milliGRAM(s) Oral daily  calcium carbonate 1250 mG + Vitamin D (OsCal 500 + D) 1 Tablet(s) Oral two times a day  dextrose 5%. 1000 milliLiter(s) (50 mL/Hr) IV Continuous <Continuous>  dextrose 50% Injectable 12.5 Gram(s) IV Push once  dextrose 50% Injectable 25 Gram(s) IV Push once  dextrose 50% Injectable 25 Gram(s) IV Push once  heparin  Infusion.  Unit(s)/Hr (7 mL/Hr) IV Continuous <Continuous>  insulin lispro (HumaLOG) corrective regimen sliding scale   SubCutaneous every 6 hours  lidocaine   Patch 1 Patch Transdermal daily  LORazepam   Injectable 0.5 milliGRAM(s) IntraMuscular once  methimazole 2.5 milliGRAM(s) Oral <User Schedule>  mirtazapine 15 milliGRAM(s) Oral at bedtime  sodium chloride 0.9%. 1000 milliLiter(s) (60 mL/Hr) IV Continuous <Continuous>    MEDICATIONS  (PRN):  ALBUTerol    0.083% 2.5 milliGRAM(s) Nebulizer every 3 hours PRN Bronchospasm  ALPRAZolam 0.125 milliGRAM(s) Oral two times a day PRN anxiety  dextrose Gel 1 Dose(s) Oral once PRN Blood Glucose LESS THAN 70 milliGRAM(s)/deciliter  glucagon  Injectable 1 milliGRAM(s) IntraMuscular once PRN Glucose LESS THAN 70 milligrams/deciliter  magnesium sulfate  IVPB 2 Gram(s) IV Intermittent once PRN magnesium <1.8  metoprolol    tartrate Injectable 5 milliGRAM(s) IV Push every 6 hours PRN For HR above 100  ondansetron Injectable 4 milliGRAM(s) IV Push every 6 hours PRN Nausea      NUTRITION/IVF: NPO    PHYSICAL EXAM:    Gen: NAD    Neurological: Awake, non-verbal, purposeful movements, aphasia    ENMT: Nares patent w/o discharge, NGT intact    Neck:  Trachea midline, No JVD    Pulmonary: CTA, b/l, Non labored, chest rise is symmetrical with respiration    Cardiovascular: Regular rate, abnormal rhythm.     Gastrointestinal: Abdomen soft and non tympanic, slightly tender to palpation, +BS x4, prevena in place with good seal, port site dressing CDI.     Genitourinary: Diaper in place, no stool in diaper at this time.     Skin: Warm and dry, no rash.     Musculoskeletal: unable to assess due to pt mental status.                             9.3    10.9  )-----------( 353      ( 02 Dec 2017 08:25 )             30.7   12-02    146<H>  |  106  |  25.0<H>  ----------------------------<  139<H>  3.7   |  27.0  |  0.97    Ca    8.4<L>      02 Dec 2017 08:39  Phos  3.4     12-02  Mg     1.8     12-02    TPro  6.5<L>  /  Alb  3.2<L>  /  TBili  0.5  /  DBili  x   /  AST  22  /  ALT  20  /  AlkPhos  81  11-30        RADIOLOGY & ADDITIONAL STUDIES:  MRI Brain 12/1/17   IMPRESSION: Acute small bilateral frontal lobe and right parietal lobe  embolic infarcts.      ASSESSMENT/PLAN:  88yFemale presenting with:  Neuro: Neurovascular checks every 2hrs; Pain control as prescribed, MRI findings: acute bilateral frontal and right parietal lobe embolic infarcts. Neuro following, recommend supportive care.   Cardio: Monitor Vital signs; Continue home meds as per primary team  Pulm: Breathing Independently  GI: Diet: [ NPO]; Monitor bowel function  : Urinating independently, diaper for incontinence   DVT ppx: Heparin drip- as per normogram   As per Dr. Edmond f/u with family meeting  Dobhoff placement for tube feeding after conformation of BM   Will discuss plan with atthending No

## 2021-05-02 NOTE — H&P ADULT - PROBLEM SELECTOR PLAN 3
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 dx when he presented with L foot drop/weakness.   - used to see neuro at [facility], lost to follow up for last few years (pt says because of "pandemic")  plan  - MRI brain as above   - heme onc on board - f/u additional recs

## 2021-05-02 NOTE — CONSULT NOTE ADULT - ASSESSMENT
Assessment: 40 year old male, PMH brain germinoma s/p chemo/rtx 2000 w/ serial MR surveillance showing resolution of tumor (last MRI 2012 within our EMR) presenting for LLE weakness. Patient states that the weakness started 5 days ago and has been constant. No pain or sensory changes are associated. Denies incontinence or lower back pain or recent traumas/falls. At baseline, he has L foot drag for which he compensates by putting more weight on RLE. On exam, 4/5 L hip flexion w/ 5/5 all throughout elsewhere. Reflexes were brisk in the lowers, symmetric. Sensation intact to LT/PP/position in the b/l LE's. Negative SLR b/l. CTH showed no evidence of gross mass or other acute pathologies. New onset weakness in the L hip flexion may be 2/2 to an MSK etiology, with the possibly of intracranial or spinal mass re-occurrence unable to be definitively ruled out, given that he stated similar LLE weakness occurred when germinoma was diagnosed.        Plan:  []MRI brain w/, w/o  []MRI L-spine w/, w/o  []PT/OT  []Fall precautions  []Hgb correction per primary team  []Further recommendations based on MR imaging    -Management & disposition to be discussed with neuro attending, Dr. Buchanan Assessment: 40 year old male, PMH brain germinoma s/p chemo/rtx 2000 w/ serial MR surveillance showing resolution of tumor (last MRI 2012 within our EMR) presenting for LLE weakness. Patient states that the weakness started 5 days ago and has been constant. No pain or sensory changes are associated. Denies incontinence or lower back pain or recent traumas/falls. At baseline, he has L foot drag for which he compensates by putting more weight on RLE. On exam, 4/5 L hip flexion w/ 5/5 all throughout elsewhere. Reflexes were brisk in the lowers, symmetric. Sensation intact to LT/PP/position in the b/l LE's. Negative SLR b/l. Hgb at ~6. CTH showed no evidence of gross mass or other acute pathologies. New onset weakness in the L hip flexion may be 2/2 to an MSK etiology, with the possibly of intracranial or spinal mass re-occurrence unable to be definitively ruled out, given that he stated similar LLE weakness occurred when germinoma was diagnosed. Anemia unlikely contributing factor given focality.       Plan:  []MRI brain w/, w/o  []MRI L-spine w/, w/o  []PT/OT  []Fall precautions  []Hgb correction per primary team  []Further recommendations based on MR imaging    -Management & disposition to be discussed with neuro attending, Dr. Buchanan Assessment: 40 year old male, PMH brain germinoma s/p chemo/rtx 2000 w/ serial MR surveillance showing resolution of tumor (last MRI 2012 within our EMR) presenting for LLE weakness. Patient states that the weakness started 5 days ago and has been constant. No pain or sensory changes are associated. Denies incontinence or lower back pain or recent traumas/falls. At baseline, he has L foot drag for which he compensates by putting more weight on RLE. On exam, 4/5 L hip flexion w/ 5/5 all throughout elsewhere. Reflexes were brisk in the lowers, symmetric. Sensation intact to LT/PP/position in the b/l LE's. Negative SLR b/l. Hgb at ~6. CTH showed no evidence of gross mass or other acute pathologies. New onset weakness in the L hip flexion may be 2/2 to an MSK etiology, with the possibly of intracranial or spinal mass re-occurrence unable to be definitively ruled out, given that he stated similar LLE weakness occurred when germinoma was diagnosed. Anemia unlikely contributing factor given focality.       Plan:  []MRI brain w/, w/o  []MRI L-spine w/o   []PT/OT  []Fall precautions  []Hgb correction per primary team  []Further recommendations based on MR imaging    -Management & disposition to be discussed with neuro attending, Dr. Buchanan

## 2021-05-03 DIAGNOSIS — D69.6 THROMBOCYTOPENIA, UNSPECIFIED: ICD-10-CM

## 2021-05-03 DIAGNOSIS — R79.1 ABNORMAL COAGULATION PROFILE: ICD-10-CM

## 2021-05-03 LAB
A1C WITH ESTIMATED AVERAGE GLUCOSE RESULT: 4.8 % — SIGNIFICANT CHANGE UP (ref 4–5.6)
ALBUMIN SERPL ELPH-MCNC: 4.3 G/DL — SIGNIFICANT CHANGE UP (ref 3.3–5)
ALP SERPL-CCNC: 52 U/L — SIGNIFICANT CHANGE UP (ref 40–120)
ALT FLD-CCNC: 18 U/L — SIGNIFICANT CHANGE UP (ref 10–45)
ANION GAP SERPL CALC-SCNC: 14 MMOL/L — SIGNIFICANT CHANGE UP (ref 5–17)
ANISOCYTOSIS BLD QL: SLIGHT — SIGNIFICANT CHANGE UP
APTT BLD: 40.9 SEC — HIGH (ref 27.5–35.5)
AST SERPL-CCNC: 15 U/L — SIGNIFICANT CHANGE UP (ref 10–40)
BASOPHILS # BLD AUTO: 0.02 K/UL — SIGNIFICANT CHANGE UP (ref 0–0.2)
BASOPHILS NFR BLD AUTO: 0.5 % — SIGNIFICANT CHANGE UP (ref 0–2)
BILIRUB SERPL-MCNC: 1 MG/DL — SIGNIFICANT CHANGE UP (ref 0.2–1.2)
BUN SERPL-MCNC: 13 MG/DL — SIGNIFICANT CHANGE UP (ref 7–23)
CALCIUM SERPL-MCNC: 9.4 MG/DL — SIGNIFICANT CHANGE UP (ref 8.4–10.5)
CERULOPLASMIN SERPL-MCNC: 19 MG/DL — SIGNIFICANT CHANGE UP (ref 15–30)
CHLORIDE SERPL-SCNC: 106 MMOL/L — SIGNIFICANT CHANGE UP (ref 96–108)
CO2 SERPL-SCNC: 22 MMOL/L — SIGNIFICANT CHANGE UP (ref 22–31)
COVID-19 SPIKE DOMAIN AB INTERP: POSITIVE
COVID-19 SPIKE DOMAIN ANTIBODY RESULT: 41.3 U/ML — HIGH
CREAT SERPL-MCNC: 0.97 MG/DL — SIGNIFICANT CHANGE UP (ref 0.5–1.3)
DACRYOCYTES BLD QL SMEAR: SLIGHT — SIGNIFICANT CHANGE UP
ELLIPTOCYTES BLD QL SMEAR: SLIGHT — SIGNIFICANT CHANGE UP
EOSINOPHIL # BLD AUTO: 0.11 K/UL — SIGNIFICANT CHANGE UP (ref 0–0.5)
EOSINOPHIL NFR BLD AUTO: 3 % — SIGNIFICANT CHANGE UP (ref 0–6)
ESTIMATED AVERAGE GLUCOSE: 91 MG/DL — SIGNIFICANT CHANGE UP (ref 68–114)
GLUCOSE SERPL-MCNC: 103 MG/DL — HIGH (ref 70–99)
HCT VFR BLD CALC: 24.3 % — LOW (ref 39–50)
HCT VFR BLD CALC: 29.5 % — LOW (ref 39–50)
HGB BLD-MCNC: 6.7 G/DL — CRITICAL LOW (ref 13–17)
HGB BLD-MCNC: 8.1 G/DL — LOW (ref 13–17)
HYPOCHROMIA BLD QL: SIGNIFICANT CHANGE UP
IGA FLD-MCNC: 135 MG/DL — SIGNIFICANT CHANGE UP (ref 84–499)
IGG FLD-MCNC: 957 MG/DL — SIGNIFICANT CHANGE UP (ref 610–1660)
IGM SERPL-MCNC: 293 MG/DL — HIGH (ref 35–242)
IMM GRANULOCYTES NFR BLD AUTO: 0.3 % — SIGNIFICANT CHANGE UP (ref 0–1.5)
INR BLD: 1.05 RATIO — SIGNIFICANT CHANGE UP (ref 0.88–1.16)
KAPPA LC SER QL IFE: 2.19 MG/DL — HIGH (ref 0.33–1.94)
KAPPA/LAMBDA FREE LIGHT CHAIN RATIO, SERUM: 1.07 RATIO — SIGNIFICANT CHANGE UP (ref 0.26–1.65)
LAMBDA LC SER QL IFE: 2.05 MG/DL — SIGNIFICANT CHANGE UP (ref 0.57–2.63)
LYMPHOCYTES # BLD AUTO: 1.03 K/UL — SIGNIFICANT CHANGE UP (ref 1–3.3)
LYMPHOCYTES # BLD AUTO: 27.7 % — SIGNIFICANT CHANGE UP (ref 13–44)
MAGNESIUM SERPL-MCNC: 2.2 MG/DL — SIGNIFICANT CHANGE UP (ref 1.6–2.6)
MANUAL SMEAR VERIFICATION: SIGNIFICANT CHANGE UP
MCHC RBC-ENTMCNC: 14.8 PG — LOW (ref 27–34)
MCHC RBC-ENTMCNC: 15.4 PG — LOW (ref 27–34)
MCHC RBC-ENTMCNC: 27.5 GM/DL — LOW (ref 32–36)
MCHC RBC-ENTMCNC: 27.6 GM/DL — LOW (ref 32–36)
MCV RBC AUTO: 53.6 FL — LOW (ref 80–100)
MCV RBC AUTO: 56.1 FL — LOW (ref 80–100)
MICROCYTES BLD QL: SIGNIFICANT CHANGE UP
MONOCYTES # BLD AUTO: 0.24 K/UL — SIGNIFICANT CHANGE UP (ref 0–0.9)
MONOCYTES NFR BLD AUTO: 6.5 % — SIGNIFICANT CHANGE UP (ref 2–14)
NEUTROPHILS # BLD AUTO: 2.31 K/UL — SIGNIFICANT CHANGE UP (ref 1.8–7.4)
NEUTROPHILS NFR BLD AUTO: 62 % — SIGNIFICANT CHANGE UP (ref 43–77)
NRBC # BLD: 0 /100 WBCS — SIGNIFICANT CHANGE UP (ref 0–0)
NRBC # BLD: 0 /100 WBCS — SIGNIFICANT CHANGE UP (ref 0–0)
OVALOCYTES BLD QL SMEAR: SLIGHT — SIGNIFICANT CHANGE UP
PHOSPHATE SERPL-MCNC: 3.2 MG/DL — SIGNIFICANT CHANGE UP (ref 2.5–4.5)
PLAT MORPH BLD: NORMAL — SIGNIFICANT CHANGE UP
PLATELET # BLD AUTO: 104 K/UL — LOW (ref 150–400)
PLATELET # BLD AUTO: 113 K/UL — LOW (ref 150–400)
POIKILOCYTOSIS BLD QL AUTO: SLIGHT — SIGNIFICANT CHANGE UP
POLYCHROMASIA BLD QL SMEAR: SLIGHT — SIGNIFICANT CHANGE UP
POTASSIUM SERPL-MCNC: 3.7 MMOL/L — SIGNIFICANT CHANGE UP (ref 3.5–5.3)
POTASSIUM SERPL-SCNC: 3.7 MMOL/L — SIGNIFICANT CHANGE UP (ref 3.5–5.3)
PROT SERPL-MCNC: 6.7 G/DL — SIGNIFICANT CHANGE UP (ref 6–8.3)
PROTHROM AB SERPL-ACNC: 12.6 SEC — SIGNIFICANT CHANGE UP (ref 10.6–13.6)
RBC # BLD: 4.53 M/UL — SIGNIFICANT CHANGE UP (ref 4.2–5.8)
RBC # BLD: 5.26 M/UL — SIGNIFICANT CHANGE UP (ref 4.2–5.8)
RBC # FLD: 26.8 % — HIGH (ref 10.3–14.5)
RBC # FLD: 30.9 % — HIGH (ref 10.3–14.5)
RBC BLD AUTO: ABNORMAL
SARS-COV-2 IGG+IGM SERPL QL IA: 41.3 U/ML — HIGH
SARS-COV-2 IGG+IGM SERPL QL IA: POSITIVE
SCHISTOCYTES BLD QL AUTO: SLIGHT — SIGNIFICANT CHANGE UP
SODIUM SERPL-SCNC: 142 MMOL/L — SIGNIFICANT CHANGE UP (ref 135–145)
TARGETS BLD QL SMEAR: SLIGHT — SIGNIFICANT CHANGE UP
WBC # BLD: 3.72 K/UL — LOW (ref 3.8–10.5)
WBC # BLD: 4.4 K/UL — SIGNIFICANT CHANGE UP (ref 3.8–10.5)
WBC # FLD AUTO: 3.72 K/UL — LOW (ref 3.8–10.5)
WBC # FLD AUTO: 4.4 K/UL — SIGNIFICANT CHANGE UP (ref 3.8–10.5)

## 2021-05-03 PROCEDURE — 99223 1ST HOSP IP/OBS HIGH 75: CPT | Mod: GC

## 2021-05-03 PROCEDURE — 74177 CT ABD & PELVIS W/CONTRAST: CPT | Mod: 26

## 2021-05-03 PROCEDURE — 72148 MRI LUMBAR SPINE W/O DYE: CPT | Mod: 26

## 2021-05-03 PROCEDURE — 99233 SBSQ HOSP IP/OBS HIGH 50: CPT | Mod: GC

## 2021-05-03 PROCEDURE — 99222 1ST HOSP IP/OBS MODERATE 55: CPT | Mod: GC

## 2021-05-03 RX ORDER — PANTOPRAZOLE SODIUM 20 MG/1
40 TABLET, DELAYED RELEASE ORAL DAILY
Refills: 0 | Status: DISCONTINUED | OUTPATIENT
Start: 2021-05-03 | End: 2021-05-07

## 2021-05-03 RX ORDER — SODIUM CHLORIDE 9 MG/ML
500 INJECTION, SOLUTION INTRAVENOUS
Refills: 0 | Status: DISCONTINUED | OUTPATIENT
Start: 2021-05-03 | End: 2021-05-06

## 2021-05-03 RX ORDER — PANTOPRAZOLE SODIUM 20 MG/1
40 TABLET, DELAYED RELEASE ORAL ONCE
Refills: 0 | Status: DISCONTINUED | OUTPATIENT
Start: 2021-05-03 | End: 2021-05-03

## 2021-05-03 RX ADMIN — PANTOPRAZOLE SODIUM 40 MILLIGRAM(S): 20 TABLET, DELAYED RELEASE ORAL at 05:02

## 2021-05-03 NOTE — PHYSICAL THERAPY INITIAL EVALUATION ADULT - TRANSFER TRAINING, PT EVAL
GOAL: Pt will perform sit to/from stand transfers independently with/without AD as needed within 2 weeks.

## 2021-05-03 NOTE — OCCUPATIONAL THERAPY INITIAL EVALUATION ADULT - DIAGNOSIS, OT EVAL
Pt p/w decreased strength, standing balance and endurance impairing independence with ADLs and mobility

## 2021-05-03 NOTE — OCCUPATIONAL THERAPY INITIAL EVALUATION ADULT - PRECAUTIONS/LIMITATIONS, REHAB EVAL
For the last week, he has whole L leg weakness. He had a L foot drop from germinoma hx but this has worsened. He is able to walk with difficulty. No pain. No urinary or fecal incontinence. No weakness in R leg or arms. No fevers/chills. No trauma/injuries.  He endorses hx of hemochromatosis and required routine phlebotomy, last time phlebotomy was 3 years ago. Has not seen a doctor in 3 years (says it's due to pandemic). Pt states his hb was ~11 few years ago when it was last checked. Current iron panel concerning for GI malignancy/bleed vs myelodysplastic syndrome vs thalassemia (+ family hx). CTH (5/2) Mild sulcal prominence suggesting mild volume loss which is nonspecific but may be related to chronic illness or certain medications. No masses. No change since 3/29/2012. For the last week, he has whole L leg weakness. He had a L foot drop from germinoma hx but this has worsened. He is able to walk with difficulty. No pain. No urinary or fecal incontinence. No weakness in R leg or arms. No fevers/chills. No trauma/injuries.  He endorses hx of hemochromatosis and required routine phlebotomy, last time phlebotomy was 3 years ago. Has not seen a doctor in 3 years (says it's due to pandemic). Pt states his hb was ~11 few years ago when it was last checked. Current iron panel concerning for GI malignancy/bleed vs myelodysplastic syndrome vs thalassemia (+ family hx). CTH (5/2) Mild sulcal prominence suggesting mild volume loss which is nonspecific but may be related to chronic illness or certain medications. No masses. No change since 3/29/2012./fall precautions

## 2021-05-03 NOTE — PROGRESS NOTE ADULT - ATTENDING COMMENTS
Pt care and plan discussed and reviewed with NP. Plan as outlined above edited by me to reflect our discussion. I had a prolonged conversation with the patient regarding hospital course, differential diagnosis and results of diagnostic tests thus far.  Plan of care discussed with patient after the evaluation. Patient expresses clear understanding and satisfaction with the plan of care. Sixty five minutes spent on total encounter, of which more than fifty percent of the encounter was spent on counseling and/or coordinating care by the attending physician.

## 2021-05-03 NOTE — PROGRESS NOTE ADULT - PROBLEM SELECTOR PLAN 7
- horizontal nystagmus with lateral eye movements - pt states has had before, is not sure cause.   - etiology: cerebellum lesion though no dysmetria and no dyskinesia. - on admission plt 237 -> 110 -> 104 today  - no heparin exposure. possible exposure to pantoprazole.   - discontinue IV pantoprazole BID -> reduced to PO 40mg QD.

## 2021-05-03 NOTE — PROGRESS NOTE ADULT - PROBLEM SELECTOR PLAN 2
- 1wk of L leg weakness with worse foot drop and gait difficulty. foot drop present on gait exam. pt had L foot drop since germinoma in 2000 and had high steppage but over last week does not have strength for this and dragging L foot.    - concerning for cord compression in setting of germinoma with lost to follow up for few years without imaging. no incontinence, weakness of R leg. possible ALS.   plan  - f/u MRI lumbar spine. f/u MRI brain for recurrence of germinoma/ other lesions.  - neuro was consulted in ED - f/u. - 1wk of L leg weakness with worse foot drop and gait difficulty. foot drop present on gait exam. pt had L foot drop since germinoma in 2000 and had high steppage but over last week does not have strength for this and dragging L foot.    - possible cord compression (though foot drop is lower motor neuron) in setting of germinoma with lost to follow up for few years without imaging. no incontinence, weakness of R leg. possible ALS.   plan  - f/u MRI lumbar spine. f/u MRI brain for recurrence of germinoma/ other lesions.  - neuro was consulted in ED - f/u. - 1wk of L leg weakness with worse foot drop and gait difficulty. foot drop present on gait exam. pt had L foot drop since germinoma in 2000 and had high steppage but over last week does not have strength for this and dragging L foot.    - possible cord compression (though foot drop is lower motor neuron) in setting of germinoma with lost to follow up for few years without imaging. no incontinence, weakness of R leg. possible ALS.   plan  - f/u MRI lumbar spine. f/u MRI brain for recurrence of germinoma/ other lesions.  - neuro was consulted in ED - f/u final recs.

## 2021-05-03 NOTE — PROGRESS NOTE ADULT - PROBLEM SELECTOR PLAN 4
- hx hemochromatosis requiring monthly phlebotomies (confirmed with PCP Dr. Ponce) - last phlebotomy 3 months ago  plan  - f/u iron panel - hx hemochromatosis requiring monthly phlebotomies (confirmed with PCP Dr. Ponce) - last phlebotomy 3 months ago  plan  - currently not in iron overload in setting of anemia above. CTM ferritin with blood transfusions.  - f/u testosterone lvl

## 2021-05-03 NOTE — CONSULT NOTE ADULT - ASSESSMENT
40M w/ hx of germinoma s/p chemo and XRT, prior hx of hemochromatosis requiring phlebotomy (none for last 3 years), FHx of thalassemia in mother, presenting w/ food drop, found to have severe iron deficiency anemia w/ Hb 6, ferritin of 2, MCV 50s.    Impression:  #Severe microcytic anemia w/ severe iron deficiency - Likely hematologically driven (i.e. thalassemia), but cannot r/o additional component of GI losses from gastric or colorectal cancers, though pt has no signs on history pointing towards this.  #Germinoma brain s/p chemo and XRT  #Hx of hemochromatosis requiring phlebotomy    Recommendations:  - Reasonable to pursue EGD/Colonoscopy as part of work-up for iron deficiency anemia. Tentative plan for tomorrow.  - Clear liquid diet today.  - Prep to be ordered by GI.  - NPO @ MN  - Trend Hb, transfuse to Hb > 7.  - No indication for PPI.    Neal Cabrera  Gastroenterology/Hepatology Fellow  Available via [employer] Teams    NON-URGENT CONSULTS:  Please email [email or web address]  [email or web address]  AT NIGHT AND ON WEEKENDS:  Contact on-call GI fellow via answering service ([phone]) from 5pm-8am and on weekends/holidays  MONDAY-FRIDAY 8AM-5PM:  Pager# [phone] ([facility]) or [phone] ([facility])  GI Phone# [phone] ([facility])   40M w/ hx of germinoma s/p chemo and XRT, prior hx of hemochromatosis requiring phlebotomy (none for last 3 years), FHx of thalassemia in mother, presenting w/ food drop, found to have severe iron deficiency anemia w/ Hb 6, ferritin of 2, MCV 50s.    Impression:  #Severe microcytic anemia w/ severe iron deficiency - Likely hematologically driven (i.e. thalassemia), but cannot r/o additional component of GI losses from gastric or colorectal cancers, though pt has no signs on history pointing towards this.  #Germinoma brain s/p chemo and XRT  #Hx of hemochromatosis requiring phlebotomy    Recommendations:  - Reasonable to pursue EGD/Colonoscopy as part of work-up for iron deficiency anemia. Will await neuro work-up first before pursuing.  - Regular diet today.  - Trend Hb, transfuse to Hb > 7.  - No indication for PPI.    Neal Cabrera  Gastroenterology/Hepatology Fellow  Available via [employer] Teams    NON-URGENT CONSULTS:  Please email [email or web address]  AT NIGHT AND ON WEEKENDS:  Contact on-call GI fellow via answering service ([phone]) from 5pm-8am and on weekends/holidays  MONDAY-FRIDAY 8AM-5PM:  Pager# [phone] ([facility]) or [phone] ([facility])  GI Phone# [phone] ([facility])   40M w/ hx of germinoma s/p chemo and XRT, prior hx of hemochromatosis requiring phlebotomy (none for last 3 years), FHx of thalassemia in mother, presenting w/ food drop, found to have severe iron deficiency anemia w/ Hb 6, ferritin of 2, MCV 50s.    Impression:  #Severe microcytic anemia w/ severe iron deficiency - Likely hematologically driven (i.e. thalassemia), but cannot r/o additional component of GI losses from gastric or colorectal cancers, though pt has no signs on history pointing towards this.  #Germinoma brain s/p chemo and XRT  #Hx of hemochromatosis requiring phlebotomy    Recommendations:  - Reasonable to pursue EGD/Colonoscopy as part of work-up for iron deficiency anemia. Will await neuro work-up first before pursuing.  - Regular diet today.  - Follow-up hemoglobin electrophoresis.  - Trend Hb, transfuse to Hb > 7.    Neal Cabrera  Gastroenterology/Hepatology Fellow  Available via [employer] Teams    NON-URGENT CONSULTS:  Please email [email or web address]  [email or web address]  AT NIGHT AND ON WEEKENDS:  Contact on-call GI fellow via answering service ([phone]) from 5pm-8am and on weekends/holidays  MONDAY-FRIDAY 8AM-5PM:  Pager# [phone] ([facility]) or [phone] ([facility])  GI Phone# [phone] ([facility])

## 2021-05-03 NOTE — PHYSICAL THERAPY INITIAL EVALUATION ADULT - PERTINENT HX OF CURRENT PROBLEM, REHAB EVAL
41 y/o man  admitted to [facility] on 5/2/21 with hx germinoma (brain) s/p chemo and radiation 2000, hx hemochromatosis (confirmed with PCP), has not followed up with doctors in 3 years presenting for L leg weakness, found to have hb of 6.1 and is now s/p 1U PRBC 5/2.

## 2021-05-03 NOTE — PROGRESS NOTE ADULT - PROBLEM SELECTOR PLAN 3
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 dx when he presented with L foot drop/weakness.   - used to see neuro at [facility], lost to follow up for last few years (pt says because of "pandemic")  plan  - MRI brain as above   - heme onc on board - f/u additional recs - hx germinoma (brain, specific location need to obtain records from [facility]) dx in 2000 s/p chemo/radiation in [id number]. dx when he presented with L foot drop/weakness.   - used to see neuro at [facility], lost to follow up for last few years (pt says because of "pandemic")  plan  - MRI brain as above   - heme onc on board - f/u additional recs

## 2021-05-03 NOTE — PROGRESS NOTE ADULT - PROBLEM SELECTOR PLAN 8
DVT ppx - SCD pending heme studies  diet - regular - fasciulation of tongue observed on exam  - etiology: benign, ALS

## 2021-05-03 NOTE — OCCUPATIONAL THERAPY INITIAL EVALUATION ADULT - ANTICIPATED DISCHARGE DISPOSITION, OT EVAL
Home OT recommended for home safety evaluation, DME training, fall prevention, ADLs, balance, strength, ROM and endurance. Recommend RW. Supervision/Assist for ADLs and functional mobility as needed

## 2021-05-03 NOTE — OCCUPATIONAL THERAPY INITIAL EVALUATION ADULT - LIVES WITH, PROFILE
Pt lives alone in [place]  with 1FOS to enter. Pt reports being independent with ADLs and mobility PTA. Pt working/driving/alone

## 2021-05-03 NOTE — PROGRESS NOTE ADULT - SUBJECTIVE AND OBJECTIVE BOX
Precious Valles PGY1    Patient is a 40y old  Male who presents with a chief complaint of Anemia (02 May 2021 15:36)      SUBJECTIVE / OVERNIGHT EVENTS:    MEDICATIONS  (STANDING):  pantoprazole  Injectable 40 milliGRAM(s) IV Push two times a day    MEDICATIONS  (PRN):  acetaminophen   Tablet .. 650 milliGRAM(s) Oral every 6 hours PRN Mild Pain (1 - 3)  polyethylene glycol 3350 17 Gram(s) Oral daily PRN Constipation      CAPILLARY BLOOD GLUCOSE        I&O's Summary    02 May 2021 07:01  -  03 May 2021 07:00  --------------------------------------------------------  IN: 0 mL / OUT: 450 mL / NET: -450 mL        Vital Signs Last 24 Hrs  T(C): 36.3 (03 May 2021 04:47), Max: 37 (02 May 2021 11:08)  T(F): 97.3 (03 May 2021 04:47), Max: 98.6 (02 May 2021 11:08)  HR: 68 (03 May 2021 04:47) (65 - 80)  BP: 113/65 (03 May 2021 04:47) (100/63 - 126/75)  BP(mean): --  RR: 18 (03 May 2021 04:47) (16 - 18)  SpO2: 99% (03 May 2021 04:47) (97% - 100%)    PHYSICAL EXAM:  HEAD:  Atraumatic, Normocephalic  EYES: extra ocular movements intact, horizontal nystagmus on lateral gaze  ENT: Moist mucous membranes  NECK: Supple  CHEST/LUNG: clear to auscultation bilaterally, no crackles/wheeze/ronchi  HEART: regular rate and rhythm, no murmurs  ABDOMEN: nontender abdomen, no masses palpated. no rebound/guarding.   EXTREMITIES:  no edema. able to move all extremities. no rashes.   NERVOUS SYSTEM: A&O x3, CN2-12 - horizontal nystagmus with lateral gaze bilaterally but EOMI , fasciculations in tongue +, otherwise CN2-12 wnl. 5/5  str bilateral UE. no dysmetria, no dyskinesia. no tremors at rest or action. no pronator drift, neg romberg.   - GAIT: prominent L foot drop, dragging L foot with each step.   MSK: FROM all 4 extremities, full and equal strength  SKIN: No rashes or lesions    LABS:                        7.0    4.09  )-----------( 110      ( 02 May 2021 20:28 )             26.2          136  |  105  |  13  ----------------------------<  98  3.7   |  20<L>  |  1.03    Ca    9.4      02 May 2021 12:00    TPro  7.3  /  Alb  4.7  /  TBili  0.7  /  DBili  x   /  AST  19  /  ALT  22  /  AlkPhos  55  05-02    PT/INR - ( 02 May 2021 13:52 )   PT: 13.3 sec;   INR: 1.11 ratio         PTT - ( 02 May 2021 13:52 )  PTT:43.0 sec      Urinalysis Basic - ( 02 May 2021 18:32 )    Color: Light Yellow / Appearance: Clear / S.013 / pH: x  Gluc: x / Ketone: Negative  / Bili: Negative / Urobili: Negative   Blood: x / Protein: Negative / Nitrite: Negative   Leuk Esterase: Negative / RBC: 1 /hpf / WBC 0 /HPF   Sq Epi: x / Non Sq Epi: 0 /hpf / Bacteria: Negative        RADIOLOGY & ADDITIONAL TESTS:    Imaging Personally Reviewed:    Consultant(s) Notes Reviewed:      Care Discussed with Consultants/Other Providers:   Precious Bermanathy PGY1    Patient is a 40y old  Male who presents with a chief complaint of Anemia (02 May 2021 15:36)      SUBJECTIVE / OVERNIGHT EVENTS:  - overnight received 1U pRBC for hb 6.1 -> hb post transfusion 7.   - am - pt feels no different before or after blood transfusion. no chest pain/no LH/no dizziness. no weakness. unchanged L LE weakness/foot drop. no abdominal pain. no bm in hospital. urinating ok.     MEDICATIONS  (STANDING):  pantoprazole  Injectable 40 milliGRAM(s) IV Push two times a day    MEDICATIONS  (PRN):  acetaminophen   Tablet .. 650 milliGRAM(s) Oral every 6 hours PRN Mild Pain (1 - 3)  polyethylene glycol 3350 17 Gram(s) Oral daily PRN Constipation      CAPILLARY BLOOD GLUCOSE        I&O's Summary    02 May 2021 07:01  -  03 May 2021 07:00  --------------------------------------------------------  IN: 0 mL / OUT: 450 mL / NET: -450 mL        Vital Signs Last 24 Hrs  T(C): 36.3 (03 May 2021 04:47), Max: 37 (02 May 2021 11:08)  T(F): 97.3 (03 May 2021 04:47), Max: 98.6 (02 May 2021 11:08)  HR: 68 (03 May 2021 04:47) (65 - 80)  BP: 113/65 (03 May 2021 04:47) (100/63 - 126/75)  BP(mean): --  RR: 18 (03 May 2021 04:47) (16 - 18)  SpO2: 99% (03 May 2021 04:47) (97% - 100%)    PHYSICAL EXAM:  GENERAL: lying down, appears comfortable on RA  HEAD:  Atraumatic, Normocephalic  EYES: extra ocular movements intact, horizontal nystagmus on lateral gaze  ENT: Moist mucous membranes  NECK: Supple  CHEST/LUNG: clear to auscultation bilaterally, no crackles/wheeze/ronchi  HEART: regular rate and rhythm, no murmurs  ABDOMEN: nontender abdomen, no masses palpated. no rebound/guarding.   EXTREMITIES:  no edema. able to move all extremities. no rashes.   NERVOUS SYSTEM: A&O x3, CN2-12 - horizontal nystagmus with lateral gaze bilaterally but EOMI , fasciculations in tongue +, otherwise CN2-12 wnl. 5/5  str bilateral UE. no dysmetria, no dyskinesia. no tremors at rest or action. no pronator drift, neg romberg.   - GAIT: prominent L foot drop, dragging L foot with each step.   MSK: FROM all 4 extremities, full and equal strength  SKIN: No rashes or lesions    LABS:                        7.0    4.09  )-----------( 110      ( 02 May 2021 20:28 )             26.2      05-    136  |  105  |  13  ----------------------------<  98  3.7   |  20<L>  |  1.03    Ca    9.4      02 May 2021 12:00    TPro  7.3  /  Alb  4.7  /  TBili  0.7  /  DBili  x   /  AST  19  /  ALT  22  /  AlkPhos  55  05-02    PT/INR - ( 02 May 2021 13:52 )   PT: 13.3 sec;   INR: 1.11 ratio         PTT - ( 02 May 2021 13:52 )  PTT:43.0 sec      Urinalysis Basic - ( 02 May 2021 18:32 )    Color: Light Yellow / Appearance: Clear / S.013 / pH: x  Gluc: x / Ketone: Negative  / Bili: Negative / Urobili: Negative   Blood: x / Protein: Negative / Nitrite: Negative   Leuk Esterase: Negative / RBC: 1 /hpf / WBC 0 /HPF   Sq Epi: x / Non Sq Epi: 0 /hpf / Bacteria: Negative        RADIOLOGY & ADDITIONAL TESTS:    Imaging Personally Reviewed:    Consultant(s) Notes Reviewed:      Care Discussed with Consultants/Other Providers:

## 2021-05-03 NOTE — OCCUPATIONAL THERAPY INITIAL EVALUATION ADULT - PERTINENT HX OF CURRENT PROBLEM, REHAB EVAL
Mr. Duran is a 41 y/o man with hx germinoma (brain) s/p chemo and radiation 2000, hx hemochromatosis (confirmed with PCP), has not followed up with doctors in 3 years presenting for L leg weakness, found to have hb of 6.1 and is now s/p 1U PRBC 5/2.

## 2021-05-03 NOTE — CONSULT NOTE ADULT - SUBJECTIVE AND OBJECTIVE BOX
Chief Complaint:  Patient is a 40y old  Male who presents with a chief complaint of Anemia (02 May 2021 15:36)    HPI:ARLIN YO is a 40y Male w/ hx of germinoma of the brain s/p chemo/XRT, hemochromatosis w/ phlebotomy (last 3 years ago), FHx of thalassemia, presenting w/ foot drop, found to be anemic to 6.     Pt reports he was diagnosed w/ hemochromatosis and underwent regular phlebotomy until about 3 years ago (last time he saw an MD). He recalls his Hb was around 11 at that time. Since then, he has had no overt GI bleeding. He denies weight loss, dysphagia, abd pain, early satiety, nausea, vomiting, diarrhea, melena, hematochezia, NSAID use. He says over the last few months he has become a little more constipated, with bowel movements every 2 days sometimes instead of daily. No change in stool caliber. No FHx of GI cancers.    No prior EGD/Colonoscopy.     PMHX/PSHX:  Germinoma    Hemochromatosis      Allergies:  No Known Allergies      Home Medications: reviewed  Hospital Medications:  acetaminophen   Tablet .. 650 milliGRAM(s) Oral every 6 hours PRN  pantoprazole  Injectable 40 milliGRAM(s) IV Push two times a day  polyethylene glycol 3350 17 Gram(s) Oral daily PRN      Social History:   Tob: Denies  EtOH: Denies  Illicit Drugs: Denies    Family history:    Denies family history of colon cancer/polyps, stomach cancer/polyps, pancreatic cancer/masses, liver cancer/disease, ovarian cancer and endometrial cancer.    ROS:   General:  No  fevers, chills, night sweats, fatigue  Eyes:  Good vision, no reported pain  ENT:  No sore throat, pain, runny nose  CV:  No pain, palpitations  Pulm:  No dyspnea, cough  GI:  See HPI, otherwise negative  :  No  incontinence, nocturia  Muscle:  No pain, weakness  Neuro:  No memory problems  Psych:  No insomnia, mood problems, depression  Endocrine:  No polyuria, polydipsia, cold/heat intolerance  Heme:  No petechiae, ecchymosis, easy bruisability  Skin:  No rash    PHYSICAL EXAM:   Vital Signs:  Vital Signs Last 24 Hrs  T(C): 36.3 (03 May 2021 04:47), Max: 37 (02 May 2021 11:08)  T(F): 97.3 (03 May 2021 04:47), Max: 98.6 (02 May 2021 11:08)  HR: 68 (03 May 2021 04:47) (65 - 80)  BP: 113/65 (03 May 2021 04:47) (100/63 - 126/75)  BP(mean): --  RR: 18 (03 May 2021 04:47) (16 - 18)  SpO2: 99% (03 May 2021 04:47) (97% - 100%)  Daily Height in cm: 177.8 (02 May 2021 11:08)    Daily     GENERAL: no acute distress  NEURO: alert  HEENT: anicteric sclera, no conjunctival pallor appreciated  CHEST: no respiratory distress, no accessory muscle use  CARDIAC: regular rate, rhythm  ABDOMEN: soft, non-tender, non-distended, no rebound or guarding  EXTREMITIES: warm, well perfused, no edema  SKIN: no lesions noted    LABS: reviewed                        7.0    4.09  )-----------( 110      ( 02 May 2021 20:28 )             26.2     05-02    136  |  105  |  13  ----------------------------<  98  3.7   |  20<L>  |  1.03    Ca    9.4      02 May 2021 12:00    TPro  7.3  /  Alb  4.7  /  TBili  0.7  /  DBili  x   /  AST  19  /  ALT  22  /  AlkPhos  55  05-02    LIVER FUNCTIONS - ( 02 May 2021 12:00 )  Alb: 4.7 g/dL / Pro: 7.3 g/dL / ALK PHOS: 55 U/L / ALT: 22 U/L / AST: 19 U/L / GGT: x               Diagnostic Studies: see sunrise for full report         Chief Complaint:  Patient is a 40y old  Male who presents with a chief complaint of Anemia (02 May 2021 15:36)    HPI:ARLIN YO is a 40y Male w/ hx of germinoma of the brain s/p chemo/XRT, hemochromatosis w/ phlebotomy (last 3 years ago), FHx of thalassemia, presenting w/ foot drop, found to be anemic to 6.     Pt reports he was diagnosed w/ hemochromatosis and underwent regular phlebotomy until about 3 years ago (last time he saw an MD). He recalls his Hb was around 11 at that time. Since then, he has had no overt GI bleeding. He denies weight loss, dysphagia, abd pain, early satiety, nausea, vomiting, diarrhea, melena, hematochezia, NSAID use. He says over the last few months he has become a little more constipated, with bowel movements every 2 days sometimes instead of daily. No change in stool caliber. No FHx of GI cancers. He came to the hospital this admission because of worsening foot drop/weakness on left lower extremities.    No prior EGD/Colonoscopy.     PMHX/PSHX:  Germinoma    Hemochromatosis      Allergies:  No Known Allergies      Home Medications: reviewed  Hospital Medications:  acetaminophen   Tablet .. 650 milliGRAM(s) Oral every 6 hours PRN  pantoprazole  Injectable 40 milliGRAM(s) IV Push two times a day  polyethylene glycol 3350 17 Gram(s) Oral daily PRN      Social History:   Tob: Denies  EtOH: Denies  Illicit Drugs: Denies    Family history:    Denies family history of colon cancer/polyps, stomach cancer/polyps, pancreatic cancer/masses, liver cancer/disease, ovarian cancer and endometrial cancer.    ROS:   General:  No  fevers, chills, night sweats, fatigue  Eyes:  Good vision, no reported pain  ENT:  No sore throat, pain, runny nose  CV:  No pain, palpitations  Pulm:  No dyspnea, cough  GI:  See HPI, otherwise negative  :  No  incontinence, nocturia  Muscle:  No pain, weakness  Neuro:  No memory problems  Psych:  No insomnia, mood problems, depression  Endocrine:  No polyuria, polydipsia, cold/heat intolerance  Heme:  No petechiae, ecchymosis, easy bruisability  Skin:  No rash    PHYSICAL EXAM:   Vital Signs:  Vital Signs Last 24 Hrs  T(C): 36.3 (03 May 2021 04:47), Max: 37 (02 May 2021 11:08)  T(F): 97.3 (03 May 2021 04:47), Max: 98.6 (02 May 2021 11:08)  HR: 68 (03 May 2021 04:47) (65 - 80)  BP: 113/65 (03 May 2021 04:47) (100/63 - 126/75)  BP(mean): --  RR: 18 (03 May 2021 04:47) (16 - 18)  SpO2: 99% (03 May 2021 04:47) (97% - 100%)  Daily Height in cm: 177.8 (02 May 2021 11:08)    Daily     GENERAL: no acute distress  NEURO: alert  HEENT: anicteric sclera, no conjunctival pallor appreciated  CHEST: no respiratory distress, no accessory muscle use  CARDIAC: regular rate, rhythm  ABDOMEN: soft, non-tender, non-distended, no rebound or guarding  EXTREMITIES: warm, well perfused, no edema  SKIN: no lesions noted    LABS: reviewed                        7.0    4.09  )-----------( 110      ( 02 May 2021 20:28 )             26.2     05-02    136  |  105  |  13  ----------------------------<  98  3.7   |  20<L>  |  1.03    Ca    9.4      02 May 2021 12:00    TPro  7.3  /  Alb  4.7  /  TBili  0.7  /  DBili  x   /  AST  19  /  ALT  22  /  AlkPhos  55  05-02    LIVER FUNCTIONS - ( 02 May 2021 12:00 )  Alb: 4.7 g/dL / Pro: 7.3 g/dL / ALK PHOS: 55 U/L / ALT: 22 U/L / AST: 19 U/L / GGT: x               Diagnostic Studies: see sunrise for full report

## 2021-05-03 NOTE — PHYSICAL THERAPY INITIAL EVALUATION ADULT - MANUAL MUSCLE TESTING RESULTS, REHAB EVAL
at least 3/5 throughout/grossly assessed due to at least 3/5 throughout, decreased strength of L dorsiflexors noted with L foot drop during ambulation/grossly assessed due to

## 2021-05-03 NOTE — CONSULT NOTE ADULT - ATTENDING COMMENTS
Patient seen and examined, agree with above. Patient with no overt bleeding, suspect there is a component of thalassemia given very low MCV and normal RBC, but he reports Hgb 11 recently, and is iron deficient. Follow-up transfusion CBC, transfuse for Hgb < 7. If no acute neuro findings, plan for EGD/colonoscopy. Follow-up hemoglobin electrophoresis which is in the lab.

## 2021-05-03 NOTE — PROGRESS NOTE ADULT - PROBLEM SELECTOR PLAN 5
leukopenia with wbc 3.11. diff wnl.   - possible bone marrow suppression vs malignancy. afebrile, no infection sx including congestion/cough/fevers/chills/diarrhea. less likely infection.   plan  - CTM daily CBC  - f/u heme onc recs - PTT prolonged to 43 on admission (INR and PT wnl).   - etiology: not on heparin. not on anticoagulants. possible von willebrand, hemophilia. not concerning for DIC. less likely cirrhosis given INR wnl, thrombycytopenia since yesterday, albumin wnl.   - f/u factor 8.   - liver imaging. f/u heme/onc recs - PTT prolonged to 43 on admission (INR and PT wnl).   - etiology: not on heparin. not on anticoagulants. possible von willebrand, hemophilia. not concerning for DIC. less likely cirrhosis given INR wnl, thrombycytopenia since yesterday, albumin wnl.   plan  - f/u factor 8.   - liver imaging. f/u heme/onc recs

## 2021-05-03 NOTE — PROGRESS NOTE ADULT - PROBLEM SELECTOR PLAN 9
- horizontal nystagmus with lateral eye movements - pt states has had before, is not sure cause.   - etiology: cerebellum lesion though no dysmetria and no dyskinesia.

## 2021-05-03 NOTE — PHYSICAL THERAPY INITIAL EVALUATION ADULT - PLANNED THERAPY INTERVENTIONS, PT EVAL
GOAL: Pt will perform 12 stairs with or without U HR as needed within 2 weeks./balance training/bed mobility training/gait training/transfer training

## 2021-05-03 NOTE — PROGRESS NOTE ADULT - ASSESSMENT
Mr. Duran is a 39 y/o man with hx germinoma (brain) s/p chemo and radiation 2000, hx hemochromatosis requiring phlebotomies, has not followed up with doctors in 3 years presenting for L leg weakness/worsening foot drop pending MRI brain/spine to r/o cord compression/recurrence of germinoma, found to be microcytic anemic hb 6.1 s/p 1U PRBC 5/2 - iron panel concerning for GI malignancy/bleed vs myelodysplastic syndrome vs thalassemia (+ family hx).     FULL CODE  neuro exam - A&O x3, responds to all commands. +nystagmus, tongue fasciculations L foot drop, mild LLE weakness.

## 2021-05-03 NOTE — PROGRESS NOTE ADULT - SUBJECTIVE AND OBJECTIVE BOX
Patient is a 40y old  Male who presents with a chief complaint of Anemia (02 May 2021 15:36)      INTERVAL HISTORY: feels ok      REVIEW OF SYSTEMS:   CONSTITUTIONAL: No weakness  EYES/ENT: No visual changes; No throat pain  Neck: No pain or stiffness  Respiratory: No cough, wheezing, No shortness of breath  CARDIOVASCULAR: no chest pain or palpitations  GASTROINTESTINAL: No abdominal pain, no nausea, vomiting or hematemesis  GENITOURINARY: No dysuria, frequency or hematuria  NEUROLOGICAL: No stroke like symptoms  SKIN: No rashes    	  MEDICATIONS:        PHYSICAL EXAM:  T(C): 36.6 (05-03-21 @ 09:24), Max: 37 (05-02-21 @ 17:00)  HR: 68 (05-03-21 @ 09:24) (65 - 72)  BP: 110/72 (05-03-21 @ 09:24) (100/63 - 117/76)  RR: 18 (05-03-21 @ 09:24) (16 - 18)  SpO2: 98% (05-03-21 @ 09:24) (97% - 100%)  Wt(kg): --  I&O's Summary    02 May 2021 07:01  -  03 May 2021 07:00  --------------------------------------------------------  IN: 0 mL / OUT: 450 mL / NET: -450 mL    03 May 2021 07:01  -  03 May 2021 13:10  --------------------------------------------------------  IN: 0 mL / OUT: 250 mL / NET: -250 mL          Appearance: In no distress	  HEENT:    PERRL, EOMI	  Cardiovascular:  S1 S2, No JVD  Respiratory: Lungs clear to auscultation	  Gastrointestinal:  Soft, Non-tender, + BS	  Vascularature:  No edema of LE  Psychiatric: Appropriate affect   Neuro: no acute focal deficits                       6.7    3.72  )-----------( 104      ( 03 May 2021 07:45 )             24.3     05-03    142  |  106  |  13  ----------------------------<  103<H>  3.7   |  22  |  0.97    Ca    9.4      03 May 2021 07:45  Phos  3.2     05-03  Mg     2.2     05-03    TPro  6.7  /  Alb  4.3  /  TBili  1.0  /  DBili  x   /  AST  15  /  ALT  18  /  AlkPhos  52  05-03  Labs personally reviewed    ASSESSMENT/PLAN: 	  Mr. Duran is a 39 y/o man with hx germinoma (brain) s/p chemo and radiation 2000, hx hemochromatosis requiring phlebotomies, has not followed up with doctors in 3 years presenting for L leg weakness/worsening foot drop pending MRI brain/spine to r/o cord compression/recurrence of germinoma, found to be microcytic anemic hb 6.1 s/p 1U PRBC 5/2 - iron panel concerning for GI malignancy/bleed vs myelodysplastic syndrome vs thalassemia (+ family hx).     FULL CODE  neuro exam - A&O x3, responds to all commands. +nystagmus, tongue fasciculations L foot drop, mild LLE weakness.     Problem/Plan - 1:   ·  Problem: SOB.  Plan: Overall weakness and SOB 2/2 significant anemia of 6.1  - Transfuse PRBC to keep hb >7  - pending Gi workup with egd/ colonoscopy pending neuro final reccs  - obtain EKG    Problem/Plan - 2:  ·  Problem: Anemia.  Plan: - admitted with hb 6.1  - clarified hemochromatosis Hx with PCP, never had anemia prior from it  - on the contrary hx hemochromatosis requiring phlebotomy every month for few years, last done ~3 years ago per pt.   - etiology of present anemia: denies melena/hematoschezia, no hemoptysis/hematemesis, no hematuria. He is FOBT positive  - ? chronic slow GI blood loss anemia over   -pending CT A/Pmalignancy workup   - Pending MRI H/ Neck  -GI reccs appreciated plan for EGD/colonscopy pending neuro reccs, still awaiting MRI H/N   - f/u hemoglobin electropheresis  - f/u iron panel  - f/u haptoglobin/LDH/B12/folate  - f/u heme/ onc reccs/ workup     Problem/Plan - 3:  ·  Problem: Germinoma.  Plan: - hx germinoma (brain) dx in 2000 s/p chemo/radiation in [id number]. dx when he presented with L foot drop/weakness.   -pending CT A/Pmalignancy workup   - Pending MRI H/ Neck  - Heme/onc follow up     Problem/Plan - 4:  ·  Problem: Hemochromatosis.  Plan: - hx hemochromatosis requiring monthly phlebotomies (confirmed with PCP Dr. Ponce) - last phlebotomy 3 months ago  plan  - f/u iron panel.     Problem/Plan - 5:  ·  Problem: Leukopenia.  Plan: leukopenia with wbc 3.11. diff wnl.   - possible bone marrow suppression vs malignancy. afebrile, no infection sx including congestion/cough/fevers/chills/diarrhea. less likely infection.   plan  - CTM daily CBC  - f/u heme onc recs.     Problem/Plan - 6:  Problem: Fasciculation of tongue. Plan: - fasciulation of tongue observed on exam  - etiology: benign, ALS.    Problem/Plan - 7:  ·  Problem: Nystagmus.  Plan: - horizontal nystagmus with lateral eye movements - pt states has had before, is not sure cause.   - etiology: cerebellum lesion though no dysmetria and no dyskinesia.     Problem/Plan - 8:  ·  Problem: Prophylactic measure.  Plan: DVT ppx - SCD pending heme studies  diet - regular.           Emily Flores ANP-C  Alejandro Smith DO Located within [facility]  Cardiovascular Medicine  [address] [place] [address], [address]  Office: [phone]  Cell: [phone] Patient is a 40y old  Male who presents with a chief complaint of Anemia (02 May 2021 15:36)      INTERVAL HISTORY: feels ok      REVIEW OF SYSTEMS:   CONSTITUTIONAL: No weakness  EYES/ENT: No visual changes; No throat pain  Neck: No pain or stiffness  Respiratory: No cough, wheezing, No shortness of breath  CARDIOVASCULAR: no chest pain or palpitations  GASTROINTESTINAL: No abdominal pain, no nausea, vomiting or hematemesis  GENITOURINARY: No dysuria, frequency or hematuria  NEUROLOGICAL: No stroke like symptoms  SKIN: No rashes    	  MEDICATIONS:        PHYSICAL EXAM:  T(C): 36.6 (05-03-21 @ 09:24), Max: 37 (05-02-21 @ 17:00)  HR: 68 (05-03-21 @ 09:24) (65 - 72)  BP: 110/72 (05-03-21 @ 09:24) (100/63 - 117/76)  RR: 18 (05-03-21 @ 09:24) (16 - 18)  SpO2: 98% (05-03-21 @ 09:24) (97% - 100%)  Wt(kg): --  I&O's Summary    02 May 2021 07:01  -  03 May 2021 07:00  --------------------------------------------------------  IN: 0 mL / OUT: 450 mL / NET: -450 mL    03 May 2021 07:01  -  03 May 2021 13:10  --------------------------------------------------------  IN: 0 mL / OUT: 250 mL / NET: -250 mL          Appearance: In no distress	  HEENT:    PERRL, EOMI	  Cardiovascular:  S1 S2, No JVD  Respiratory: Lungs clear to auscultation	  Gastrointestinal:  Soft, Non-tender, + BS	  Vascularature:  No edema of LE  Psychiatric: Appropriate affect   Neuro: no acute focal deficits                       6.7    3.72  )-----------( 104      ( 03 May 2021 07:45 )             24.3     05-03    142  |  106  |  13  ----------------------------<  103<H>  3.7   |  22  |  0.97    Ca    9.4      03 May 2021 07:45  Phos  3.2     05-03  Mg     2.2     05-03    TPro  6.7  /  Alb  4.3  /  TBili  1.0  /  DBili  x   /  AST  15  /  ALT  18  /  AlkPhos  52  05-03  Labs personally reviewed    ASSESSMENT/PLAN: 	  Mr. Duran is a 41 y/o man with hx germinoma (brain) s/p chemo and radiation 2000, hx hemochromatosis requiring phlebotomies, has not followed up with doctors in 3 years presenting for L leg weakness/worsening foot drop pending MRI brain/spine to r/o cord compression/recurrence of germinoma, found to be microcytic anemic hb 6.1 s/p 1U PRBC 5/2 - iron panel concerning for GI malignancy/bleed vs myelodysplastic syndrome vs thalassemia (+ family hx).     FULL CODE  neuro exam - A&O x3, responds to all commands. +nystagmus, tongue fasciculations L foot drop, mild LLE weakness.     Problem/Plan - 1:   ·  Problem: SOB.  Plan: Overall weakness and SOB 2/2 significant anemia of 6.1  - Transfuse PRBC to keep hb >7  - pending Gi workup with egd/ colonoscopy pending neuro final reccs  - EKG with NSR no ischemic changes    Problem/Plan - 2:  ·  Problem: Anemia.  Plan: - admitted with hb 6.1  - clarified hemochromatosis Hx with PCP, never had anemia prior from it  - on the contrary hx hemochromatosis requiring phlebotomy every month for few years, last done ~3 years ago per pt.   - etiology of present anemia: denies melena/hematoschezia, no hemoptysis/hematemesis, no hematuria. He is FOBT positive  - ? chronic slow GI blood loss anemia over   -pending CT A/Pmalignancy workup   - Pending MRI H/ Neck  -GI reccs appreciated plan for EGD/colonscopy pending neuro reccs, still awaiting MRI H/N   - f/u hemoglobin electropheresis  -Iron panel with severe iron def anemia   - f/u haptoglobin/LDH/B12/folate  - f/u heme/ onc reccs/ workup     Problem/Plan - 3:  ·  Problem: Germinoma.  Plan: - hx germinoma (brain) dx in 2000 s/p chemo/radiation in [id number]. dx when he presented with L foot drop/weakness.   -pending CT A/Pmalignancy workup   - Pending MRI H/ Neck  - Heme/onc follow up     Problem/Plan - 4:  ·  Problem: Hemochromatosis.  Plan: - hx hemochromatosis requiring monthly phlebotomies (confirmed with PCP Dr. Ponce) - last phlebotomy 3 months ago  plan  - iron panel consistent with severe Iron def anemia, suspicious for blood loss anemia     Problem/Plan - 5:  ·  Problem: Leukopenia.  Plan: leukopenia with wbc 3.11. diff wnl.   - possible bone marrow suppression vs malignancy. afebrile, no infection sx including congestion/cough/fevers/chills/diarrhea. less likely infection.   plan  - CTM daily CBC  - f/u heme onc recs.     Problem/Plan - 6:  Problem: Fasciculation of tongue. Plan: - fasciulation of tongue observed on exam  - etiology: benign, ALS.    Problem/Plan - 7:  ·  Problem: Nystagmus.  Plan: - horizontal nystagmus with lateral eye movements - pt states has had before, is not sure cause.   - etiology: cerebellum lesion though no dysmetria and no dyskinesia.     Problem/Plan - 8:  ·  Problem: Prophylactic measure.  Plan: DVT ppx - SCD pending heme studies  diet - regular.           Emily Flores ANP-C  Alejandro Smith DO [facility]  Cardiovascular Medicine  [address] [place] [address], [address]  Office: [phone]  Cell: [phone]

## 2021-05-03 NOTE — PROGRESS NOTE ADULT - PROBLEM SELECTOR PLAN 1
- admitted with hb 6.1 (f/u baseline from PCP), MCV 50.7. RDW 22.9. smear with moderate hypochromia, slight target cells but heme/onc to re-examine smear. PTT elevated to 42.1.   - per pt, mother with hemochromatosis but never needed blood transfusion. [place] heritage. pt required blood transfusions during chemo. hx hemochromatosis requiring phlebotomy every month for few years (confirmed with PCP), last done ~3 years ago per pt.   - etiology of present anemia: no bleeding reported by pt (no melena/hematoschezia, no hemoptysis/hematemesis, no hematuria). severe microcytic anemia concerning for thalassemia though anemia is new (no records available per PCP given pt has not followed up in few years). Elevated RDW possible iron deficiency but pt has hx hemochromatosis. Possible bone marrow suppression. Possible RP hemorrhage - if other labs suggestive for bleeding rather than thallasemia then will consider obtaining CT abdomen to r/o RP hemorrhage.   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 - admitted with hb 6.1 (f/u baseline from PCP), MCV 50.7. RDW 22.9. smear with moderate hypochromia, slight target cells but heme/onc to re-examine smear. PTT elevated to 42.1.   - per pt, mother with thalassemia but never needed blood transfusion. [place] heritage. pt required blood transfusions during chemo. hx hemochromatosis requiring phlebotomy every month for few years (confirmed with PCP), last done ~3 years ago per pt.   - etiology of present anemia: possible GI malignancy vs myelodysplastic syndrome vs thalassemia. no bleeding reported by pt (no melena/hematoschezia, no hemoptysis/hematemesis, no hematuria). However, pos FOBT and severe JAS w ferritin 2 concerning for possible GI malignancy. severe microcytic anemia concerning for thalassemia though anemia is new (no records available per PCP given pt has not followed up in few years). Possible RP hemorrhage but no bruising/back pain - if other work up negative, will obtain CT abdomen.   plan  - iron panel consistent with JAS: serum iron 13, TIBC 481, ferritin 2.   - LDH and haptoglobin not concerning for hemolysis. retic % 0.7 which is low in setting of anemia with hb <7.    - HIV negative.   - f/u hemoglobin electropheresis  - f/u lead, zn, heavy metal poison  - f/u celiac labs (IgM, anti-transglutaminase) - admitted with hb 6.1 (f/u baseline from PCP), MCV 50.7. RDW 22.9. smear with moderate hypochromia, target cells. PTT elevated to 42.1.   - per pt, mother with thalassemia but never needed blood transfusion. [place] heritage. pt required blood transfusions during chemo. hx hemochromatosis requiring phlebotomy every month for few years (confirmed with PCP), last done ~3 years ago per pt.   - etiology of present anemia: possible GI malignancy vs myelodysplastic syndrome vs thalassemia. no bleeding reported by pt (no melena/hematoschezia, no hemoptysis/hematemesis, no hematuria). However, pos FOBT and severe JAS w ferritin 2 concerning for possible GI malignancy. severe microcytic anemia concerning for thalassemia though anemia is new (no records available per PCP given pt has not followed up in few years). Possible RP hemorrhage but no bruising/back pain. possible MDS given anemia, could have acquired thalassemia (hemoglobin H) causing anemia.   plan  - iron panel consistent with severe JAS: serum iron 13, TIBC 481, ferritin 2.   - GI consulted for endoscopy/colonoscopy to evaluate for malignancy/GI bleed. NPO after MN (clear liquid today).   - LDH and haptoglobin not concerning for hemolysis. retic % 0.7 which is low in setting of anemia with hb <7 suggesting bone marrow suppression.   - HIV negative.   - f/u hemoglobin electropheresis  - f/u lead, zn, heavy metal poison  - f/u celiac labs (IgM, anti-transglutaminase)

## 2021-05-03 NOTE — PHYSICAL THERAPY INITIAL EVALUATION ADULT - PRECAUTIONS/LIMITATIONS, REHAB EVAL
For the last week, he has whole L leg weakness. He had a L foot drop from germinoma hx but this has worsened. He is able to walk with difficulty. No pain. No urinary or fecal incontinence. No weakness in R leg or arms. No fevers/chills. No trauma/injuries.  He endorses hx of hemochromatosis and required routine phlebotomy, last time phlebotomy was 3 years ago. Has not seen a doctor in 3 years (says it's due to pandemic). Pt states his hb was ~11 few years ago when it was last checked. Current iron panel concerning for GI malignancy/bleed vs myelodysplastic syndrome vs thalassemia (+ family hx). CTH (5/2) Mild sulcal prominence suggesting mild volume loss which is nonspecific but may be related to chronic illness or certain medications. No masses. No change since 3/29/2012. For the last week, he has whole L leg weakness. He had a L foot drop from germinoma hx but this has worsened. He is able to walk with difficulty. No pain. No urinary or fecal incontinence. No weakness in R leg or arms. No fevers/chills. No trauma/injuries.  He endorses hx of hemochromatosis and required routine phlebotomy, last time phlebotomy was 3 years ago. Has not seen a doctor in 3 years (says it's due to pandemic). Pt states his hb was ~11 few years ago when it was last checked. Current iron panel concerning for GI malignancy/bleed vs myelodysplastic syndrome vs thalassemia (+ family hx). CTH (5/2) Mild sulcal prominence suggesting mild volume loss which is nonspecific but may be related to chronic illness or certain medications. No masses. No change since 3/29/2012./fall precautions For the last week, he has whole L leg weakness. He had a L foot drop from germinoma hx but this has worsened. He is able to walk with difficulty. No pain. No urinary or fecal incontinence. No weakness in R leg or arms. No fevers/chills. No trauma/injuries.  He endorses hx of hemochromatosis and required routine phlebotomy, last time phlebotomy was 3 years ago. Has not seen a doctor in 3 years (says it's due to pandemic). Pt states his hb was ~11 few years ago when it was last checked. Current iron panel concerning for GI malignancy/bleed vs myelodysplastic syndrome vs thalassemia (+ family hx). CTH (5/2) Mild sulcal prominence suggesting mild volume loss which is nonspecific but may be related to chronic illness or certain medications. No masses. No change since 3/29/2012. MRI head: Increasing number of foci of elevated signal intensity within the subcortical and periventricular white matter. Differential diagnosis includes ischemic, infectious/inflammatory and demyelinating processes. New small inferior left frontal meningioma./fall precautions

## 2021-05-03 NOTE — PROGRESS NOTE ADULT - PROBLEM SELECTOR PLAN 6
- fasciulation of tongue observed on exam  - etiology: benign, ALS leukopenia with wbc 3.11 on admission. diff wnl. today wnl.   - possible bone marrow suppression vs malignancy. afebrile, no infection sx including congestion/cough/fevers/chills/diarrhea. less likely infection.   plan  - CTM daily CBC  - f/u heme onc recs

## 2021-05-03 NOTE — PROGRESS NOTE ADULT - ATTENDING COMMENTS
-Patient reports history of hemochromatosis for which he was getting phlebotomy for. He says she stopped going about 2 years ago. -Will see if can get records from his outside hematologist.   -Patient also reports history of thalassemia in himself and his mother.   -Patient was treated with bleomycin, carboplatin, and -16 for germinoma in 2000. -Will check AFP and beta-HCG.   -He reports left eye issues and LLE foot drop which were chronic but recently worsened.   -He denies melena or BRBPR.   -Will get CT abd/pelvis with contrast to further eval for malignancy. Will give IVF's for renal protection.   -Will get lupus profile for prolonged PTT.   -PRBC transfusions and iron infusions for repletion.   -Will check testosterone levels.   -Suspect patient may have chronic blood loss anemia and a history of thalassemia and hemochromatosis. Has remained largely asymptomatic until recently given presumably previously large iron load and chronic low level blood loss, so patient was able to compensate until now.   -F/u heme recs.   -F/u neuro recs. -MRI brain and L spine pending.   -F/u GI recs. -Endoscopic evaluations pending. -C/w PPI for now given FOBT positive.

## 2021-05-03 NOTE — PHYSICAL THERAPY INITIAL EVALUATION ADULT - BALANCE TRAINING, PT EVAL
GOAL: Pt will improve balance during (static/dynamic) (sitting/standing) activities by at least 1 balance grade within 2 weeks to assist with greater independence during functional mobility and ADL's.

## 2021-05-03 NOTE — OCCUPATIONAL THERAPY INITIAL EVALUATION ADULT - ADL RETRAINING, OT EVAL
GOAL; Pt will be able to perform UB/LB dressing Ind in 4 weeks; GOAL: pt will be able to perform UB/LB bathing Independently in 4 weeks

## 2021-05-03 NOTE — PHYSICAL THERAPY INITIAL EVALUATION ADULT - ADDITIONAL COMMENTS
Pt lives alone in apartment with 12 steps. Pt was independent with all ADLs/mobility with no use of AD. Pt reports that sister lives in basement apartment in his building and there are other family members 5 minutes away.

## 2021-05-04 LAB
AFP-TM SERPL-MCNC: 3.6 NG/ML — SIGNIFICANT CHANGE UP
ALBUMIN SERPL ELPH-MCNC: 4.5 G/DL — SIGNIFICANT CHANGE UP (ref 3.3–5)
ALP SERPL-CCNC: 53 U/L — SIGNIFICANT CHANGE UP (ref 40–120)
ALT FLD-CCNC: 17 U/L — SIGNIFICANT CHANGE UP (ref 10–45)
ANION GAP SERPL CALC-SCNC: 15 MMOL/L — SIGNIFICANT CHANGE UP (ref 5–17)
APTT BLD: 40.1 SEC — HIGH (ref 27.5–35.5)
ARSENIC BLD-MCNC: 8 UG/L — SIGNIFICANT CHANGE UP (ref 2–23)
AST SERPL-CCNC: 17 U/L — SIGNIFICANT CHANGE UP (ref 10–40)
BASOPHILS # BLD AUTO: 0.02 K/UL — SIGNIFICANT CHANGE UP (ref 0–0.2)
BASOPHILS NFR BLD AUTO: 0.4 % — SIGNIFICANT CHANGE UP (ref 0–2)
BILIRUB SERPL-MCNC: 1.4 MG/DL — HIGH (ref 0.2–1.2)
BUN SERPL-MCNC: 10 MG/DL — SIGNIFICANT CHANGE UP (ref 7–23)
CADMIUM SERPL-MCNC: 0.6 UG/L — SIGNIFICANT CHANGE UP (ref 0–1.2)
CALCIUM SERPL-MCNC: 9.6 MG/DL — SIGNIFICANT CHANGE UP (ref 8.4–10.5)
CHLORIDE SERPL-SCNC: 103 MMOL/L — SIGNIFICANT CHANGE UP (ref 96–108)
CO2 SERPL-SCNC: 22 MMOL/L — SIGNIFICANT CHANGE UP (ref 22–31)
CREAT SERPL-MCNC: 0.94 MG/DL — SIGNIFICANT CHANGE UP (ref 0.5–1.3)
DRVVT SCREEN TO CONFIRM RATIO: SIGNIFICANT CHANGE UP
EOSINOPHIL # BLD AUTO: 0.1 K/UL — SIGNIFICANT CHANGE UP (ref 0–0.5)
EOSINOPHIL NFR BLD AUTO: 2.2 % — SIGNIFICANT CHANGE UP (ref 0–6)
FACT IX PPP CHRO-ACNC: 73 % — LOW (ref 80–165)
FACT VIII ACT/NOR PPP: 22 % — LOW (ref 60–125)
FACT VIII ACT/NOR PPP: 23 % — LOW (ref 60–125)
FACT XII ACT/NOR PPP: 75 % — SIGNIFICANT CHANGE UP (ref 70–145)
GLUCOSE SERPL-MCNC: 80 MG/DL — SIGNIFICANT CHANGE UP (ref 70–99)
HCG-TM SERPL-ACNC: <1 MIU/ML — SIGNIFICANT CHANGE UP
HCT VFR BLD CALC: 29.3 % — LOW (ref 39–50)
HEMOGLOBIN INTERPRETATION: SIGNIFICANT CHANGE UP
HGB A MFR BLD: 95.1 % — LOW (ref 95.8–98)
HGB A2 MFR BLD: 4.9 % — HIGH (ref 2–3.2)
HGB BLD-MCNC: 8.1 G/DL — LOW (ref 13–17)
IMM GRANULOCYTES NFR BLD AUTO: 0.4 % — SIGNIFICANT CHANGE UP (ref 0–1.5)
INR BLD: 1.06 RATIO — SIGNIFICANT CHANGE UP (ref 0.88–1.16)
LA NT DPL PPP QL: 34.9 SEC — SIGNIFICANT CHANGE UP
LEAD BLD-MCNC: <1 UG/DL — SIGNIFICANT CHANGE UP (ref 0–4)
LEAD BLD-MCNC: <1 UG/DL — SIGNIFICANT CHANGE UP (ref 0–4)
LYMPHOCYTES # BLD AUTO: 1.31 K/UL — SIGNIFICANT CHANGE UP (ref 1–3.3)
LYMPHOCYTES # BLD AUTO: 29.3 % — SIGNIFICANT CHANGE UP (ref 13–44)
MAGNESIUM SERPL-MCNC: 2.3 MG/DL — SIGNIFICANT CHANGE UP (ref 1.6–2.6)
MCHC RBC-ENTMCNC: 15.5 PG — LOW (ref 27–34)
MCHC RBC-ENTMCNC: 27.6 GM/DL — LOW (ref 32–36)
MCV RBC AUTO: 56.2 FL — LOW (ref 80–100)
MERCURY SERPL-MCNC: 1.7 UG/L — SIGNIFICANT CHANGE UP (ref 0–14.9)
MONOCYTES # BLD AUTO: 0.33 K/UL — SIGNIFICANT CHANGE UP (ref 0–0.9)
MONOCYTES NFR BLD AUTO: 7.4 % — SIGNIFICANT CHANGE UP (ref 2–14)
NEUTROPHILS # BLD AUTO: 2.69 K/UL — SIGNIFICANT CHANGE UP (ref 1.8–7.4)
NEUTROPHILS NFR BLD AUTO: 60.3 % — SIGNIFICANT CHANGE UP (ref 43–77)
NORMALIZED SCT PPP-RTO: 0.83 RATIO — SIGNIFICANT CHANGE UP (ref 0–1.16)
NORMALIZED SCT PPP-RTO: SIGNIFICANT CHANGE UP
NRBC # BLD: 0 /100 WBCS — SIGNIFICANT CHANGE UP (ref 0–0)
PHOSPHATE SERPL-MCNC: 3.4 MG/DL — SIGNIFICANT CHANGE UP (ref 2.5–4.5)
PLATELET # BLD AUTO: 109 K/UL — LOW (ref 150–400)
POTASSIUM SERPL-MCNC: 3.8 MMOL/L — SIGNIFICANT CHANGE UP (ref 3.5–5.3)
POTASSIUM SERPL-SCNC: 3.8 MMOL/L — SIGNIFICANT CHANGE UP (ref 3.5–5.3)
PROT SERPL-MCNC: 7.3 G/DL — SIGNIFICANT CHANGE UP (ref 6–8.3)
PROTHROM AB SERPL-ACNC: 12.7 SEC — SIGNIFICANT CHANGE UP (ref 10.6–13.6)
RBC # BLD: 5.21 M/UL — SIGNIFICANT CHANGE UP (ref 4.2–5.8)
RBC # FLD: 30.9 % — HIGH (ref 10.3–14.5)
SODIUM SERPL-SCNC: 140 MMOL/L — SIGNIFICANT CHANGE UP (ref 135–145)
TTG IGA SER-ACNC: <1.2 U/ML — SIGNIFICANT CHANGE UP
TTG IGA SER-ACNC: NEGATIVE — SIGNIFICANT CHANGE UP
TTG IGG SER IA-ACNC: NEGATIVE — SIGNIFICANT CHANGE UP
TTG IGG SER-ACNC: <1.2 U/ML — SIGNIFICANT CHANGE UP
VWF:RCO ACT/NOR PPP PL AGG: 127 % — SIGNIFICANT CHANGE UP (ref 45–133)
WBC # BLD: 4.47 K/UL — SIGNIFICANT CHANGE UP (ref 3.8–10.5)
WBC # FLD AUTO: 4.47 K/UL — SIGNIFICANT CHANGE UP (ref 3.8–10.5)

## 2021-05-04 PROCEDURE — 99222 1ST HOSP IP/OBS MODERATE 55: CPT | Mod: GC

## 2021-05-04 PROCEDURE — 99233 SBSQ HOSP IP/OBS HIGH 50: CPT | Mod: GC

## 2021-05-04 PROCEDURE — 99232 SBSQ HOSP IP/OBS MODERATE 35: CPT | Mod: GC

## 2021-05-04 PROCEDURE — 70553 MRI BRAIN STEM W/O & W/DYE: CPT | Mod: 26

## 2021-05-04 RX ORDER — IRON SUCROSE 20 MG/ML
200 INJECTION, SOLUTION INTRAVENOUS ONCE
Refills: 0 | Status: DISCONTINUED | OUTPATIENT
Start: 2021-05-04 | End: 2021-05-04

## 2021-05-04 RX ORDER — SOD SULF/SODIUM/NAHCO3/KCL/PEG
4000 SOLUTION, RECONSTITUTED, ORAL ORAL ONCE
Refills: 0 | Status: COMPLETED | OUTPATIENT
Start: 2021-05-04 | End: 2021-05-04

## 2021-05-04 RX ORDER — IRON SUCROSE 20 MG/ML
200 INJECTION, SOLUTION INTRAVENOUS EVERY 24 HOURS
Refills: 0 | Status: DISCONTINUED | OUTPATIENT
Start: 2021-05-04 | End: 2021-05-04

## 2021-05-04 RX ORDER — IRON SUCROSE 20 MG/ML
200 INJECTION, SOLUTION INTRAVENOUS ONCE
Refills: 0 | Status: COMPLETED | OUTPATIENT
Start: 2021-05-04 | End: 2021-05-04

## 2021-05-04 RX ADMIN — PANTOPRAZOLE SODIUM 40 MILLIGRAM(S): 20 TABLET, DELAYED RELEASE ORAL at 05:21

## 2021-05-04 RX ADMIN — IRON SUCROSE 110 MILLIGRAM(S): 20 INJECTION, SOLUTION INTRAVENOUS at 17:06

## 2021-05-04 NOTE — PROGRESS NOTE ADULT - ATTENDING COMMENTS
Patient seen and examined, agree with above. Awaiting neurologic workup for MRI findings and LE weakness. Once acute neuro issues have been worked up, we will plan for EGD/colonoscopy. Await neuro input.

## 2021-05-04 NOTE — PROVIDER CONTACT NOTE (CRITICAL VALUE NOTIFICATION) - ACTION/TREATMENT ORDERED:
MD Wilde.
MD Wetzel notified. No actions ordered at this time , will continue to monitor.
MD will contact hem/onc to see if pt needs another blood transfusion. Continue to monitor.

## 2021-05-04 NOTE — PROGRESS NOTE ADULT - SUBJECTIVE AND OBJECTIVE BOX
Precious Valles PGY1    Patient is a 40y old  Male who presents with a chief complaint of Anemia (02 May 2021 15:36)      SUBJECTIVE / OVERNIGHT EVENTS:  - overnight - no events  - am - pt states he feels slightly more energetic after the second blood transfusion yesterday. he endorses a hx of thalassemia and hemochromatosis dx in . no chest pain/sob/abdominal pain. unchanged LLE weakness.     MEDICATIONS  (STANDING):  lactated ringers. 500 milliLiter(s) (50 mL/Hr) IV Continuous <Continuous>  pantoprazole  Injectable 40 milliGRAM(s) IV Push daily    MEDICATIONS  (PRN):  acetaminophen   Tablet .. 650 milliGRAM(s) Oral every 6 hours PRN Mild Pain (1 - 3)  polyethylene glycol 3350 17 Gram(s) Oral daily PRN Constipation      CAPILLARY BLOOD GLUCOSE        I&O's Summary    03 May 2021 07:01  -  04 May 2021 07:00  --------------------------------------------------------  IN: 1680 mL / OUT: 675 mL / NET: 1005 mL        Vital Signs Last 24 Hrs  T(C): 37.1 (04 May 2021 05:21), Max: 37.1 (04 May 2021 05:21)  T(F): 98.7 (04 May 2021 05:21), Max: 98.7 (04 May 2021 05:21)  HR: 68 (04 May 2021 05:21) (64 - 84)  BP: 115/74 (04 May 2021 05:21) (106/66 - 115/74)  BP(mean): --  RR: 18 (04 May 2021 05:21) (18 - 18)  SpO2: 99% (04 May 2021 05:21) (97% - 100%)    PHYSICAL EXAM:  GENERAL: lying down, appears comfortable on RA  HEAD:  Atraumatic, Normocephalic  EYES: extra ocular movements intact, horizontal nystagmus on lateral gaze  ENT: Moist mucous membranes  NECK: Supple  CHEST/LUNG: clear to auscultation bilaterally, no crackles/wheeze/ronchi  HEART: regular rate and rhythm, no murmurs  ABDOMEN: nontender abdomen, no masses palpated. no rebound/guarding.   EXTREMITIES:  no edema. able to move all extremities. no rashes.   NERVOUS SYSTEM: A&O x3, CN2-12 - horizontal nystagmus with lateral gaze bilaterally but EOMI , fasciculations in tongue +, otherwise CN2-12 wnl. 5/5  str bilateral UE. no dysmetria, no dyskinesia. no tremors at rest or action. no pronator drift, neg romberg.   - GAIT: prominent L foot drop, dragging L foot with each step.   MSK: FROM all 4 extremities, full and equal strength  SKIN: No rashes or lesions    LABS:                        8.1    4.47  )-----------( 109      ( 04 May 2021 07:21 )             29.3      05-04    140  |  103  |  10  ----------------------------<  80  3.8   |  22  |  0.94    Ca    9.6      04 May 2021 07:22  Phos  3.4     05-04  Mg     2.3     05-04    TPro  7.3  /  Alb  4.5  /  TBili  1.4<H>  /  DBili  x   /  AST  17  /  ALT  17  /  AlkPhos  53  05-04    PT/INR - ( 03 May 2021 07:45 )   PT: 12.6 sec;   INR: 1.05 ratio         PTT - ( 03 May 2021 07:45 )  PTT:40.9 sec      Urinalysis Basic - ( 02 May 2021 18:32 )    Color: Light Yellow / Appearance: Clear / S.013 / pH: x  Gluc: x / Ketone: Negative  / Bili: Negative / Urobili: Negative   Blood: x / Protein: Negative / Nitrite: Negative   Leuk Esterase: Negative / RBC: 1 /hpf / WBC 0 /HPF   Sq Epi: x / Non Sq Epi: 0 /hpf / Bacteria: Negative        RADIOLOGY & ADDITIONAL TESTS:    Imaging Personally Reviewed:    Consultant(s) Notes Reviewed:      Care Discussed with Consultants/Other Providers:   Precious Valles PGY1    Patient is a 40y old  Male who presents with a chief complaint of Anemia (02 May 2021 15:36)      SUBJECTIVE / OVERNIGHT EVENTS:  - overnight - no events  - am - pt states he feels slightly more energetic after the second blood transfusion yesterday. he endorses a hx of thalassemia and hemochromatosis dx in . no chest pain/sob/abdominal pain. unchanged LLE weakness.   - Dr. Ortiz ([facility]) - dx with hemochromatosis  (dx with MRI which showed iron deposition, unclear if genetic testing ever done), no phlebotomies at least since , had macrocytic anemia (hb unknown), germinoma - unknown regimen and hx.    MEDICATIONS  (STANDING):  lactated ringers. 500 milliLiter(s) (50 mL/Hr) IV Continuous <Continuous>  pantoprazole  Injectable 40 milliGRAM(s) IV Push daily    MEDICATIONS  (PRN):  acetaminophen   Tablet .. 650 milliGRAM(s) Oral every 6 hours PRN Mild Pain (1 - 3)  polyethylene glycol 3350 17 Gram(s) Oral daily PRN Constipation      CAPILLARY BLOOD GLUCOSE        I&O's Summary    03 May 2021 07:01  -  04 May 2021 07:00  --------------------------------------------------------  IN: 1680 mL / OUT: 675 mL / NET: 1005 mL        Vital Signs Last 24 Hrs  T(C): 37.1 (04 May 2021 05:21), Max: 37.1 (04 May 2021 05:21)  T(F): 98.7 (04 May 2021 05:21), Max: 98.7 (04 May 2021 05:21)  HR: 68 (04 May 2021 05:21) (64 - 84)  BP: 115/74 (04 May 2021 05:21) (106/66 - 115/74)  BP(mean): --  RR: 18 (04 May 2021 05:21) (18 - 18)  SpO2: 99% (04 May 2021 05:21) (97% - 100%)    PHYSICAL EXAM:  GENERAL: lying down, appears comfortable on RA  HEAD:  Atraumatic, Normocephalic  EYES: extra ocular movements intact, horizontal nystagmus on lateral gaze  ENT: Moist mucous membranes  NECK: Supple  CHEST/LUNG: clear to auscultation bilaterally, no crackles/wheeze/ronchi  HEART: regular rate and rhythm, no murmurs  ABDOMEN: nontender abdomen, no masses palpated. no rebound/guarding.   EXTREMITIES:  no edema. able to move all extremities. no rashes.   NERVOUS SYSTEM: A&O x3, CN2-12 - horizontal nystagmus with lateral gaze bilaterally but EOMI , fasciculations in tongue +, otherwise CN2-12 wnl. 5/5  str bilateral UE. no dysmetria, no dyskinesia. no tremors at rest or action. no pronator drift, neg romberg.   - GAIT: prominent L foot drop, dragging L foot with each step.   MSK: FROM all 4 extremities, full and equal strength  SKIN: No rashes or lesions    LABS:                        8.1    4.47  )-----------( 109      ( 04 May 2021 07:21 )             29.3      05-04    140  |  103  |  10  ----------------------------<  80  3.8   |  22  |  0.94    Ca    9.6      04 May 2021 07:22  Phos  3.4     05-04  Mg     2.3     05-04    TPro  7.3  /  Alb  4.5  /  TBili  1.4<H>  /  DBili  x   /  AST  17  /  ALT  17  /  AlkPhos  53  05-04    PT/INR - ( 03 May 2021 07:45 )   PT: 12.6 sec;   INR: 1.05 ratio         PTT - ( 03 May 2021 07:45 )  PTT:40.9 sec      Urinalysis Basic - ( 02 May 2021 18:32 )    Color: Light Yellow / Appearance: Clear / S.013 / pH: x  Gluc: x / Ketone: Negative  / Bili: Negative / Urobili: Negative   Blood: x / Protein: Negative / Nitrite: Negative   Leuk Esterase: Negative / RBC: 1 /hpf / WBC 0 /HPF   Sq Epi: x / Non Sq Epi: 0 /hpf / Bacteria: Negative        RADIOLOGY & ADDITIONAL TESTS:    Imaging Personally Reviewed:    Consultant(s) Notes Reviewed:      Care Discussed with Consultants/Other Providers:   Precious Valles PGY1    Patient is a 40y old  Male who presents with a chief complaint of Anemia (02 May 2021 15:36)      SUBJECTIVE / OVERNIGHT EVENTS:  - overnight - no events  - am - pt states he feels slightly more energetic after the second blood transfusion yesterday. he endorses a hx of thalassemia and hemochromatosis dx in . no chest pain/sob/abdominal pain. unchanged LLE weakness.   - Dr. Ortiz ([facility]) - dx with hemochromatosis  (dx with MRI which showed iron deposition, unclear if genetic testing ever done), no phlebotomies at least since , had microcytic anemia (hb unknown), germinoma - unknown regimen and hx.    MEDICATIONS  (STANDING):  lactated ringers. 500 milliLiter(s) (50 mL/Hr) IV Continuous <Continuous>  pantoprazole  Injectable 40 milliGRAM(s) IV Push daily    MEDICATIONS  (PRN):  acetaminophen   Tablet .. 650 milliGRAM(s) Oral every 6 hours PRN Mild Pain (1 - 3)  polyethylene glycol 3350 17 Gram(s) Oral daily PRN Constipation      CAPILLARY BLOOD GLUCOSE        I&O's Summary    03 May 2021 07:01  -  04 May 2021 07:00  --------------------------------------------------------  IN: 1680 mL / OUT: 675 mL / NET: 1005 mL        Vital Signs Last 24 Hrs  T(C): 37.1 (04 May 2021 05:21), Max: 37.1 (04 May 2021 05:21)  T(F): 98.7 (04 May 2021 05:21), Max: 98.7 (04 May 2021 05:21)  HR: 68 (04 May 2021 05:21) (64 - 84)  BP: 115/74 (04 May 2021 05:21) (106/66 - 115/74)  BP(mean): --  RR: 18 (04 May 2021 05:21) (18 - 18)  SpO2: 99% (04 May 2021 05:21) (97% - 100%)    PHYSICAL EXAM:  GENERAL: lying down, appears comfortable on RA  HEAD:  Atraumatic, Normocephalic  EYES: extra ocular movements intact, horizontal nystagmus on lateral gaze  ENT: Moist mucous membranes  NECK: Supple  CHEST/LUNG: clear to auscultation bilaterally, no crackles/wheeze/ronchi  HEART: regular rate and rhythm, no murmurs  ABDOMEN: nontender abdomen, no masses palpated. no rebound/guarding.   EXTREMITIES:  no edema. able to move all extremities. no rashes.   NERVOUS SYSTEM: A&O x3, CN2-12 - horizontal nystagmus with lateral gaze bilaterally but EOMI , fasciculations in tongue +, otherwise CN2-12 wnl. 5/5  str bilateral UE. no dysmetria, no dyskinesia. no tremors at rest or action. no pronator drift, neg romberg.   - GAIT: prominent L foot drop, dragging L foot with each step.   MSK: FROM all 4 extremities, full and equal strength  SKIN: No rashes or lesions    LABS:                        8.1    4.47  )-----------( 109      ( 04 May 2021 07:21 )             29.3      05-04    140  |  103  |  10  ----------------------------<  80  3.8   |  22  |  0.94    Ca    9.6      04 May 2021 07:22  Phos  3.4     05-04  Mg     2.3     05-04    TPro  7.3  /  Alb  4.5  /  TBili  1.4<H>  /  DBili  x   /  AST  17  /  ALT  17  /  AlkPhos  53  05-04    PT/INR - ( 03 May 2021 07:45 )   PT: 12.6 sec;   INR: 1.05 ratio         PTT - ( 03 May 2021 07:45 )  PTT:40.9 sec      Urinalysis Basic - ( 02 May 2021 18:32 )    Color: Light Yellow / Appearance: Clear / S.013 / pH: x  Gluc: x / Ketone: Negative  / Bili: Negative / Urobili: Negative   Blood: x / Protein: Negative / Nitrite: Negative   Leuk Esterase: Negative / RBC: 1 /hpf / WBC 0 /HPF   Sq Epi: x / Non Sq Epi: 0 /hpf / Bacteria: Negative        RADIOLOGY & ADDITIONAL TESTS:    Imaging Personally Reviewed:    Consultant(s) Notes Reviewed:      Care Discussed with Consultants/Other Providers:   Precious Valles PGY1    Patient is a 40y old  Male who presents with a chief complaint of Anemia (02 May 2021 15:36)      SUBJECTIVE / OVERNIGHT EVENTS:  - overnight - no events  - am - pt states he feels slightly more energetic after the second blood transfusion yesterday. he endorses a hx of thalassemia and hemochromatosis dx. no chest pain/sob/abdominal pain. unchanged LLE weakness.   - Dr. Ortiz ([facility]) - dx with hemochromatosis  (dx with MRI which showed iron deposition, unclear if genetic testing ever done), no phlebotomies at least since 2017, had microcytic anemia (hb unknown), germinoma - unknown regimen and hx.    MEDICATIONS  (STANDING):  lactated ringers. 500 milliLiter(s) (50 mL/Hr) IV Continuous <Continuous>  pantoprazole  Injectable 40 milliGRAM(s) IV Push daily    MEDICATIONS  (PRN):  acetaminophen   Tablet .. 650 milliGRAM(s) Oral every 6 hours PRN Mild Pain (1 - 3)  polyethylene glycol 3350 17 Gram(s) Oral daily PRN Constipation      CAPILLARY BLOOD GLUCOSE        I&O's Summary    03 May 2021 07:01  -  04 May 2021 07:00  --------------------------------------------------------  IN: 1680 mL / OUT: 675 mL / NET: 1005 mL        Vital Signs Last 24 Hrs  T(C): 37.1 (04 May 2021 05:21), Max: 37.1 (04 May 2021 05:21)  T(F): 98.7 (04 May 2021 05:21), Max: 98.7 (04 May 2021 05:21)  HR: 68 (04 May 2021 05:21) (64 - 84)  BP: 115/74 (04 May 2021 05:21) (106/66 - 115/74)  BP(mean): --  RR: 18 (04 May 2021 05:21) (18 - 18)  SpO2: 99% (04 May 2021 05:21) (97% - 100%)    PHYSICAL EXAM:  GENERAL: lying down, appears comfortable on RA  HEAD:  Atraumatic, Normocephalic  EYES: extra ocular movements intact, horizontal nystagmus on lateral gaze  ENT: Moist mucous membranes  NECK: Supple  CHEST/LUNG: clear to auscultation bilaterally, no crackles/wheeze/ronchi  HEART: regular rate and rhythm, no murmurs  ABDOMEN: nontender abdomen, no masses palpated. no rebound/guarding.   EXTREMITIES:  no edema. able to move all extremities. no rashes.   NERVOUS SYSTEM: A&O x3, CN2-12 - horizontal nystagmus with lateral gaze bilaterally but EOMI , fasciculations in tongue +, otherwise CN2-12 wnl. 5/5  str bilateral UE. no dysmetria, no dyskinesia. no tremors at rest or action. no pronator drift, neg romberg.   - GAIT: prominent L foot drop, dragging L foot with each step.   MSK: FROM all 4 extremities, full and equal strength  SKIN: No rashes or lesions    LABS:                        8.1    4.47  )-----------( 109      ( 04 May 2021 07:21 )             29.3      05-04    140  |  103  |  10  ----------------------------<  80  3.8   |  22  |  0.94    Ca    9.6      04 May 2021 07:22  Phos  3.4     05-04  Mg     2.3     05-04    TPro  7.3  /  Alb  4.5  /  TBili  1.4<H>  /  DBili  x   /  AST  17  /  ALT  17  /  AlkPhos  53  05-04    PT/INR - ( 03 May 2021 07:45 )   PT: 12.6 sec;   INR: 1.05 ratio         PTT - ( 03 May 2021 07:45 )  PTT:40.9 sec      Urinalysis Basic - ( 02 May 2021 18:32 )    Color: Light Yellow / Appearance: Clear / S.013 / pH: x  Gluc: x / Ketone: Negative  / Bili: Negative / Urobili: Negative   Blood: x / Protein: Negative / Nitrite: Negative   Leuk Esterase: Negative / RBC: 1 /hpf / WBC 0 /HPF   Sq Epi: x / Non Sq Epi: 0 /hpf / Bacteria: Negative        RADIOLOGY & ADDITIONAL TESTS:    Imaging Personally Reviewed:    Consultant(s) Notes Reviewed:      Care Discussed with Consultants/Other Providers:

## 2021-05-04 NOTE — PROGRESS NOTE ADULT - ATTENDING COMMENTS
This is a 40 yoM with a h/o a germinoma s/p chemo RT in 2000, thal minor, reported hemochromatosis s/p phlebotomy 10 years ago,  here with anemia, Iron deficiency and worsened weakness in LLE.    Anemia/Iron deficiency  -fatigue seems to be exacerbated over the last few months but hard to say, more likely  slowly progressive process;  -obtain baseline cbc  -agree with prbc and venofer, will consider further infusions as inpatient versus outpatient    Thrombocyotpenia  -new, needbaseline   -? 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 if he had a ptt work up at that time-- will inquire with patient  -favor ddavp prior to colonoscopy (though he has never had a ddavp trial)> risk of bleeding is low in mild hemophilia patients with low risk procedures, while we give him ddavp we could also perform the trial in patient.  -can also use TXA is any additional unexpected bleeding, along with factor VIII replacement if more significant

## 2021-05-04 NOTE — PROGRESS NOTE ADULT - SUBJECTIVE AND OBJECTIVE BOX
DATE OF SERVICE: 05-04-21 @ 21:17    Patient is a 40y old  Male who presents with a chief complaint of Anemia (02 May 2021 15:36)      INTERVAL HISTORY: feels well     REVIEW OF SYSTEMS:  CONSTITUTIONAL: No weakness  EYES/ENT: No visual changes;  No throat pain   NECK: No pain or stiffness  RESPIRATORY: No cough, wheezing; No shortness of breath  CARDIOVASCULAR: No chest pain or palpitations  GASTROINTESTINAL: No abdominal  pain. No nausea, vomiting, or hematemesis  GENITOURINARY: No dysuria, frequency or hematuria  NEUROLOGICAL: No stroke like symptoms  SKIN: No rashes          PHYSICAL EXAM:  T(C): 36.6 (05-04-21 @ 20:19), Max: 37.1 (05-04-21 @ 05:21)  HR: 71 (05-04-21 @ 20:19) (64 - 74)  BP: 113/77 (05-04-21 @ 20:19) (107/70 - 115/74)  RR: 18 (05-04-21 @ 20:19) (18 - 18)  SpO2: 97% (05-04-21 @ 20:19) (97% - 99%)  Wt(kg): --  I&O's Summary    03 May 2021 07:01  -  04 May 2021 07:00  --------------------------------------------------------  IN: 1680 mL / OUT: 675 mL / NET: 1005 mL    04 May 2021 07:01  -  04 May 2021 21:17  --------------------------------------------------------  IN: 1080 mL / OUT: 850 mL / NET: 230 mL          Appearance: In no distress	  HEENT:    PERRL, EOMI	  Cardiovascular:  S1 S2, No JVD  Respiratory: Lungs clear to auscultation	  Gastrointestinal:  Soft, Non-tender, + BS	  Vascularature:  No edema of LE  Psychiatric: Appropriate affect   Neuro: no acute focal deficits                               8.1    4.47  )-----------( 109      ( 04 May 2021 07:21 )             29.3     05-04    140  |  103  |  10  ----------------------------<  80  3.8   |  22  |  0.94    Ca    9.6      04 May 2021 07:22  Phos  3.4     05-04  Mg     2.3     05-04    TPro  7.3  /  Alb  4.5  /  TBili  1.4<H>  /  DBili  x   /  AST  17  /  ALT  17  /  AlkPhos  53  05-04        Labs personally reviewed      ASSESSMENT/PLAN: 	  Mr. Duran is a 41 y/o man with hx germinoma (brain) s/p chemo and radiation 2000, hx hemochromatosis requiring phlebotomies, has not followed up with doctors in 3 years presenting for L leg weakness/worsening foot drop pending MRI brain/spine to r/o cord compression/recurrence of germinoma, found to be microcytic anemic hb 6.1 s/p 1U PRBC 5/2 - iron panel concerning for GI malignancy/bleed vs myelodysplastic syndrome vs thalassemia (+ family hx).     FULL CODE  neuro exam - A&O x3, responds to all commands. +nystagmus, tongue fasciculations L foot drop, mild LLE weakness.     Problem/Plan - 1:   ·  Problem: SOB.  Plan: Overall weakness and SOB 2/2 significant anemia of 6.1  - Transfuse PRBC to keep hb >7  - pending Gi workup with egd/ colonoscopy pending neuro final reccs  - EKG with NSR no ischemic changes    Problem/Plan - 2:  ·  Problem: Anemia.  Plan: - admitted with hb 6.1  - clarified hemochromatosis Hx with PCP, never had anemia prior from it  - on the contrary hx hemochromatosis requiring phlebotomy every month for few years, last done ~3 years ago per pt.   - etiology of present anemia: denies melena/hematoschezia, no hemoptysis/hematemesis, no hematuria. He is FOBT positive  - ? chronic slow GI blood loss anemia over   -pending CT A/Pmalignancy workup   - Pending MRI H/ Neck  -GI reccs appreciated plan for EGD/colonscopy pending neuro reccs, still awaiting MRI H/N   - f/u hemoglobin electropheresis  -Iron panel with severe iron def anemia   - f/u haptoglobin/LDH/B12/folate  - f/u heme/ onc reccs/ workup     Problem/Plan - 3:  ·  Problem: Germinoma.  Plan: - hx germinoma (brain) dx in 2000 s/p chemo/radiation in 5895-8756. dx when he presented with L foot drop/weakness.   -pending CT A/Pmalignancy workup   - Pending MRI H/ Neck  - Heme/onc follow up     Problem/Plan - 4:  ·  Problem: Hemochromatosis.  Plan: - hx hemochromatosis requiring monthly phlebotomies (confirmed with PCP Dr. Ponce) - last phlebotomy 3 months ago  plan  - iron panel consistent with severe Iron def anemia, suspicious for blood loss anemia     Problem/Plan - 5:  ·  Problem: Leukopenia.  Plan: leukopenia with wbc 3.11. diff wnl.   - possible bone marrow suppression vs malignancy. afebrile, no infection sx including congestion/cough/fevers/chills/diarrhea. less likely infection.   plan  - CTM daily CBC  - f/u heme onc recs.     Problem/Plan - 6:  Problem: Fasciculation of tongue. Plan: - fasciulation of tongue observed on exam  - etiology: benign, ALS.    Problem/Plan - 7:  ·  Problem: Nystagmus.  Plan: - horizontal nystagmus with lateral eye movements - pt states has had before, is not sure cause.   - etiology: cerebellum lesion though no dysmetria and no dyskinesia.     Problem/Plan - 8:  ·  Problem: Prophylactic measure.  Plan: DVT ppx - SCD pending heme studies  diet - regular.                 Alejandro Smith DO [facility]  Cardiovascular Medicine  [address] [place] [address], [address]  Office: [phone]  Cell: [phone] DATE OF SERVICE: 05-04-21 @ 21:17    Patient is a 40y old  Male who presents with a chief complaint of Anemia (02 May 2021 15:36)      INTERVAL HISTORY: feels well     REVIEW OF SYSTEMS:  CONSTITUTIONAL: No weakness  EYES/ENT: No visual changes;  No throat pain   NECK: No pain or stiffness  RESPIRATORY: No cough, wheezing; No shortness of breath  CARDIOVASCULAR: No chest pain or palpitations  GASTROINTESTINAL: No abdominal  pain. No nausea, vomiting, or hematemesis  GENITOURINARY: No dysuria, frequency or hematuria  NEUROLOGICAL: No stroke like symptoms  SKIN: No rashes          PHYSICAL EXAM:  T(C): 36.6 (05-04-21 @ 20:19), Max: 37.1 (05-04-21 @ 05:21)  HR: 71 (05-04-21 @ 20:19) (64 - 74)  BP: 113/77 (05-04-21 @ 20:19) (107/70 - 115/74)  RR: 18 (05-04-21 @ 20:19) (18 - 18)  SpO2: 97% (05-04-21 @ 20:19) (97% - 99%)  Wt(kg): --  I&O's Summary    03 May 2021 07:01  -  04 May 2021 07:00  --------------------------------------------------------  IN: 1680 mL / OUT: 675 mL / NET: 1005 mL    04 May 2021 07:01  -  04 May 2021 21:17  --------------------------------------------------------  IN: 1080 mL / OUT: 850 mL / NET: 230 mL          Appearance: In no distress	  HEENT:    PERRL, EOMI	  Cardiovascular:  S1 S2, No JVD  Respiratory: Lungs clear to auscultation	  Gastrointestinal:  Soft, Non-tender, + BS	  Vascularature:  No edema of LE  Psychiatric: Appropriate affect   Neuro: no acute focal deficits                               8.1    4.47  )-----------( 109      ( 04 May 2021 07:21 )             29.3     05-04    140  |  103  |  10  ----------------------------<  80  3.8   |  22  |  0.94    Ca    9.6      04 May 2021 07:22  Phos  3.4     05-04  Mg     2.3     05-04    TPro  7.3  /  Alb  4.5  /  TBili  1.4<H>  /  DBili  x   /  AST  17  /  ALT  17  /  AlkPhos  53  05-04        Labs personally reviewed    < from: MR Head w/wo IV Cont (05.04.21 @ 00:18) >  IMPRESSION:    Brain MRI: Unchanged encephalomalacia and gliosis within the genuine of the corpus callosum.    Increased signal intensity along the dorsal medulla without enhancement is nonspecific in appearance. Increasing number of foci of elevated signal intensity within the subcortical and periventricular white matter. Differential diagnosis includes ischemic, infectious/inflammatory and demyelinating processes. Cervical spine MRI with contrast may be valuable for further evaluation.    New small inferior left frontal meningioma.    Lumbar spine MRI: Unremarkable.          ASSESSMENT/PLAN: 	  Mr. Duran is a 41 y/o man with hx germinoma (brain) s/p chemo and radiation 2000, hx hemochromatosis requiring phlebotomies, has not followed up with doctors in 3 years presenting for L leg weakness/worsening foot drop pending MRI brain/spine to r/o cord compression/recurrence of germinoma, found to be microcytic anemic hb 6.1 s/p 1U PRBC 5/2 - iron panel concerning for GI malignancy/bleed vs myelodysplastic syndrome vs thalassemia (+ family hx).       Problem/Plan - 1:   ·  Problem: SOB.  Plan: Overall weakness and SOB 2/2 significant anemia of 6.1  - Transfuse PRBC to keep hb >7  - pending GI workup with egd/colonoscopy pending neuro final reccs  - EKG with NSR no ischemic changes    Problem/Plan - 2:  ·  Problem: Anemia.  Plan: - admitted with hb 6.1  - clarified hemochromatosis Hx with PCP, never had anemia prior from it  - on the contrary hx hemochromatosis requiring phlebotomy every month for few years, last done ~3 years ago per pt.   - etiology of present anemia: denies melena/ hematoschezia, no hemoptysis/hematemesis, no hematuria. He is FOBT positive  - ? chronic slow GI blood loss anemia over   - CT abd with moderate splenomegaly  -GI reccs appreciated plan for EGD/colonoscopy Wednesday   - f/u hemoglobin electropheresis  -Iron panel with severe iron def anemia   - Heme/onc recs and follow up appreciated     Problem/Plan - 3:  ·  Problem: Germinoma.  Plan: - hx germinoma (brain) dx in 2000 s/p chemo/radiation in 6060-0270. dx when he presented with L foot drop/weakness.   -pending CT A/P malignancy workup   - MRI head Increased signal intensity along the dorsal medulla without enhancement is nonspecific in appearance. Increasing number of foci of elevated signal intensity within the subcortical and periventricular white matter. Differential diagnosis includes ischemic, infectious/inflammatory and demyelinating processes.   - Heme/onc follow up     Problem/Plan - 4:  ·  Problem: Hemochromatosis.  Plan: - hx hemochromatosis requiring monthly phlebotomies (confirmed with PCP Dr. Ponce) - last phlebotomy 3 months ago  plan  - iron panel consistent with severe Iron def anemia, suspicious for blood loss anemia   - heme rec MRI liver and genetic testing for hemachromatosis     Problem/Plan - 5:  ·  Problem: Leukopenia.  Plan: leukopenia with wbc 3.11. diff wnl.   - possible bone marrow suppression vs malignancy. afebrile, no infection sx including congestion/cough/fevers/chills/diarrhea. Less likely infection.   plan  - CTM daily CBC  - f/u heme onc recs.     Problem/Plan - 6:  Problem: Fasciculation of tongue. Plan: - fasciulation of tongue observed on exam  - etiology: benign, ALS.    Problem/Plan - 7:  ·  Problem: Nystagmus.  Plan: - horizontal nystagmus with lateral eye movements - pt states has had before, is not sure cause.   - etiology: cerebellum lesion though no dysmetria and no dyskinesia.     Problem/Plan - 8:  ·  Problem: Abnormal MRI brain.  Plan: MRI head Increased signal intensity along the dorsal medulla without enhancement is nonspecific in appearance. Increasing number of foci of elevated signal intensity within the subcortical and periventricular white matter. Differential diagnosis includes ischemic, infectious/inflammatory and demyelinating processes.   - neurology follow up noted       I had a prolonged conversation with the patient (and his 2 sisters on separate occasions at his request) regarding hospital course, differential diagnosis and results of diagnostic tests thus far.  Plan of care discussed with patient after the evaluation. Patient expresses clear understanding and satisfaction with the plan of care. Sixty five minutes spent on total encounter, of which more than fifty percent of the encounter was spent on counseling and/or coordinating care by the attending physician.  Plan discussed with PCP Dr Ponce as well.         Alejandro Smith DO [facility]  Cardiovascular Medicine  [address] [place] [address], [address]  Office: [phone]  Cell: [phone]

## 2021-05-04 NOTE — PROGRESS NOTE ADULT - PROBLEM SELECTOR PLAN 5
- PTT prolonged to 43 on admission (INR and PT wnl).   - etiology: not on heparin. not on anticoagulants. possible von willebrand, hemophilia. not concerning for DIC. less likely cirrhosis given INR wnl, thrombycytopenia since yesterday, albumin wnl.   plan  - f/u factor 8.   - liver imaging. f/u heme/onc recs

## 2021-05-04 NOTE — PROGRESS NOTE ADULT - SUBJECTIVE AND OBJECTIVE BOX
Chief Complaint:  Patient is a 40y old  Male who presents with a chief complaint of Anemia (02 May 2021 15:36)      Interval Events: No new events. No abdominal pain.     Allergies:  No Known Allergies      Hospital Medications:  acetaminophen   Tablet .. 650 milliGRAM(s) Oral every 6 hours PRN  lactated ringers. 500 milliLiter(s) IV Continuous <Continuous>  pantoprazole  Injectable 40 milliGRAM(s) IV Push daily  polyethylene glycol 3350 17 Gram(s) Oral daily PRN      PMHX/PSHX:  Germinoma    Hemochromatosis        Family history:      ROS: As per HPI, 14-point ROS negative otherwise.    General:  No wt loss, fevers, chills, night sweats, fatigue,   Eyes:  Good vision, no reported pain  ENT:  No sore throat, pain, runny nose, dysphagia  CV:  No pain, palpitations, hypo/hypertension  Resp:  No dyspnea, cough, tachypnea, wheezing  GI:  See HPI  :  No pain, bleeding, incontinence, nocturia  Muscle:  No pain, weakness  Neuro:  No weakness, tingling, memory problems  Psych:  No fatigue, insomnia, mood problems, depression  Endocrine:  No polyuria, polydipsia, cold/heat intolerance  Heme:  No petechiae, ecchymosis, easy bruisability  Skin:  No rash, edema      PHYSICAL EXAM:     Vital Signs:  Vital Signs Last 24 Hrs  T(C): 36.6 (04 May 2021 08:58), Max: 37.1 (04 May 2021 05:21)  T(F): 97.8 (04 May 2021 08:58), Max: 98.7 (04 May 2021 05:21)  HR: 70 (04 May 2021 08:58) (64 - 84)  BP: 110/69 (04 May 2021 08:58) (106/66 - 115/74)  BP(mean): --  RR: 18 (04 May 2021 08:58) (18 - 18)  SpO2: 97% (04 May 2021 08:58) (97% - 100%)  Daily     Daily     GENERAL:  appears comfortable, no acute distress  HEENT:  NC/AT,  conjunctivae clear, sclera -anicteric  CHEST:  no increased effort  HEART:  Regular rate and rhythm  ABDOMEN:  Soft, non-tender, non-distended,  no masses ,no hepato-splenomegaly,   EXTREMITIES:  no cyanosis, clubbing or edema  SKIN:  No rash/erythema/ecchymoses/petechiae/wounds  NEURO:  Alert, oriented    LABS:                        8.1    4.47  )-----------( 109      ( 04 May 2021 07:21 )             29.3     05-04    140  |  103  |  10  ----------------------------<  80  3.8   |  22  |  0.94    Ca    9.6      04 May 2021 07:22  Phos  3.4     05-04  Mg     2.3     05-04    TPro  7.3  /  Alb  4.5  /  TBili  1.4<H>  /  DBili  x   /  AST  17  /  ALT  17  /  AlkPhos  53  05-04    LIVER FUNCTIONS - ( 04 May 2021 07:22 )  Alb: 4.5 g/dL / Pro: 7.3 g/dL / ALK PHOS: 53 U/L / ALT: 17 U/L / AST: 17 U/L / GGT: x           PT/INR - ( 04 May 2021 07:19 )   PT: 12.7 sec;   INR: 1.06 ratio         PTT - ( 04 May 2021 07:19 )  PTT:40.1 sec  Urinalysis Basic - ( 02 May 2021 18:32 )    Color: Light Yellow / Appearance: Clear / S.013 / pH: x  Gluc: x / Ketone: Negative  / Bili: Negative / Urobili: Negative   Blood: x / Protein: Negative / Nitrite: Negative   Leuk Esterase: Negative / RBC: 1 /hpf / WBC 0 /HPF   Sq Epi: x / Non Sq Epi: 0 /hpf / Bacteria: Negative          Imaging:             Chief Complaint:  Patient is a 40y old  Male who presents with a chief complaint of Anemia (02 May 2021 15:36)      Interval Events: No new events. No abdominal pain. Unchanged LE weakness, no BM today.    Allergies:  No Known Allergies      Hospital Medications:  acetaminophen   Tablet .. 650 milliGRAM(s) Oral every 6 hours PRN  lactated ringers. 500 milliLiter(s) IV Continuous <Continuous>  pantoprazole  Injectable 40 milliGRAM(s) IV Push daily  polyethylene glycol 3350 17 Gram(s) Oral daily PRN      PMHX/PSHX:  Germinoma    Hemochromatosis        Family history:      ROS: As per HPI, 14-point ROS negative otherwise.    General:  No wt loss, fevers, chills, night sweats, fatigue,   Eyes:  Good vision, no reported pain  ENT:  No sore throat, pain, runny nose, dysphagia  CV:  No pain, palpitations, hypo/hypertension  Resp:  No dyspnea, cough, tachypnea, wheezing  GI:  See HPI  :  No pain, bleeding, incontinence, nocturia  Muscle:  No pain, weakness  Neuro:  No weakness, tingling, memory problems  Psych:  No fatigue, insomnia, mood problems, depression  Endocrine:  No polyuria, polydipsia, cold/heat intolerance  Heme:  No petechiae, ecchymosis, easy bruisability  Skin:  No rash, edema      PHYSICAL EXAM:     Vital Signs:  Vital Signs Last 24 Hrs  T(C): 36.6 (04 May 2021 08:58), Max: 37.1 (04 May 2021 05:21)  T(F): 97.8 (04 May 2021 08:58), Max: 98.7 (04 May 2021 05:21)  HR: 70 (04 May 2021 08:58) (64 - 84)  BP: 110/69 (04 May 2021 08:58) (106/66 - 115/74)  BP(mean): --  RR: 18 (04 May 2021 08:58) (18 - 18)  SpO2: 97% (04 May 2021 08:58) (97% - 100%)  Daily     Daily     GENERAL:  appears comfortable, no acute distress  HEENT:  NC/AT,  conjunctivae clear, sclera -anicteric  CHEST:  no increased effort  HEART:  Regular rate and rhythm  ABDOMEN:  Soft, non-tender, non-distended,  no masses ,no hepato-splenomegaly,   EXTREMITIES:  no cyanosis, clubbing or edema  SKIN:  No rash/erythema/ecchymoses/petechiae/wounds  NEURO:  Alert, oriented    LABS:                        8.1    4.47  )-----------( 109      ( 04 May 2021 07:21 )             29.3     05-04    140  |  103  |  10  ----------------------------<  80  3.8   |  22  |  0.94    Ca    9.6      04 May 2021 07:22  Phos  3.4     05-04  Mg     2.3     05-04    TPro  7.3  /  Alb  4.5  /  TBili  1.4<H>  /  DBili  x   /  AST  17  /  ALT  17  /  AlkPhos  53  05-04    LIVER FUNCTIONS - ( 04 May 2021 07:22 )  Alb: 4.5 g/dL / Pro: 7.3 g/dL / ALK PHOS: 53 U/L / ALT: 17 U/L / AST: 17 U/L / GGT: x           PT/INR - ( 04 May 2021 07:19 )   PT: 12.7 sec;   INR: 1.06 ratio         PTT - ( 04 May 2021 07:19 )  PTT:40.1 sec  Urinalysis Basic - ( 02 May 2021 18:32 )    Color: Light Yellow / Appearance: Clear / S.013 / pH: x  Gluc: x / Ketone: Negative  / Bili: Negative / Urobili: Negative   Blood: x / Protein: Negative / Nitrite: Negative   Leuk Esterase: Negative / RBC: 1 /hpf / WBC 0 /HPF   Sq Epi: x / Non Sq Epi: 0 /hpf / Bacteria: Negative          Imaging:

## 2021-05-04 NOTE — PROGRESS NOTE ADULT - PROBLEM SELECTOR PLAN 4
- hx hemochromatosis requiring monthly phlebotomies (confirmed with PCP Dr. Ponce) - last phlebotomy 3 months ago  plan  - currently not in iron overload in setting of anemia above. CTM ferritin with blood transfusions.  - f/u testosterone lvl

## 2021-05-04 NOTE — PROGRESS NOTE ADULT - SUBJECTIVE AND OBJECTIVE BOX
Hematology Oncology Follow-up    INTERVAL HPI/OVERNIGHT EVENTS:  No o/n events, patient resting comfortably. No complaints at this time. Patient specifically denies fever, chills, dizziness, weakness, CP, palpitations, SOB, cough, N/V/D/C, dysuria, changes in bowel movements, LE edema.    VITAL SIGNS:  T(F): 97.8 (21 @ 08:58)  HR: 70 (21 @ 08:58)  BP: 110/69 (21 @ 08:58)  RR: 18 (21 @ 08:58)  SpO2: 97% (21 @ 08:58)  Wt(kg): --    21 @ 07:01  -  21 @ 07:00  --------------------------------------------------------  IN: 1680 mL / OUT: 675 mL / NET: 1005 mL    21 @ 07:01  -  21 @ 13:07  --------------------------------------------------------  IN: 0 mL / OUT: 300 mL / NET: -300 mL          Review of Systems:  General: denies fevers/chills  Respiratory: denies cough, shortness of breath  Cardiovascular: denies chest pain, palpitations  Gastrointestinal: denies nausea, vomiting, abdominal pain, constipation, diarrhea, melena, hematochezia  MSK: denies joint pain or muscle pain  Neuro: denies headache, weakness, or parasthesias  Skin: denies rash, petichiae, echymoses  Psych: denies anxiety or sleep disturbances    PHYSICAL EXAM:  Constitutional: NAD  Respiratory: CTA b/l, symmetric chest rise, with normal respiratory effort  Cardiovascular: RRR  Gastrointestinal: soft, NTND  Extremities:  no edema  MSK: no obvious abnormalities  Neurological: Grossly intact  Skin: no rash, no echymoses, no petichiae  Psych: normal affect    MEDICATIONS  (STANDING):  iron sucrose IVPB 200 milliGRAM(s) IV Intermittent every 24 hours  lactated ringers. 500 milliLiter(s) (50 mL/Hr) IV Continuous <Continuous>  pantoprazole  Injectable 40 milliGRAM(s) IV Push daily    MEDICATIONS  (PRN):  acetaminophen   Tablet .. 650 milliGRAM(s) Oral every 6 hours PRN Mild Pain (1 - 3)  polyethylene glycol 3350 17 Gram(s) Oral daily PRN Constipation      No Known Allergies      LABS:                        8.1    4.47  )-----------( 109      ( 04 May 2021 07:21 )             29.3     05-04    140  |  103  |  10  ----------------------------<  80  3.8   |  22  |  0.94    Ca    9.6      04 May 2021 07:22  Phos  3.4     05-04  Mg     2.3     05-04    TPro  7.3  /  Alb  4.5  /  TBili  1.4<H>  /  DBili  x   /  AST  17  /  ALT  17  /  AlkPhos  53  05-04    PT/INR - ( 04 May 2021 07:19 )   PT: 12.7 sec;   INR: 1.06 ratio         PTT - ( 04 May 2021 07:19 )  PTT:40.1 sec   Urinalysis Basic - ( 02 May 2021 18:32 )    Color: Light Yellow / Appearance: Clear / S.013 / pH: x  Gluc: x / Ketone: Negative  / Bili: Negative / Urobili: Negative   Blood: x / Protein: Negative / Nitrite: Negative   Leuk Esterase: Negative / RBC: 1 /hpf / WBC 0 /HPF   Sq Epi: x / Non Sq Epi: 0 /hpf / Bacteria: Negative        Ferritin, Serum: 2 ng/mL *L* (21 @ 15:32)  Iron - Total Binding Capacity.: 481 ug/dL *H* (21 @ 15:19)  % Saturation, Iron: 3 % *L* (21 @ 15:19)  Iron Total, Serum: 13 ug/dL *L* (21 @ 15:19)  Unsaturated Iron Binding Capacity: 468 ug/dL *H* (21 @ 15:19)    Folate, Serum: 14.9 ng/mL (21 @ 18:23)  Vitamin B12, Serum: 363 pg/mL (21 @ 18:23)          RADIOLOGY & ADDITIONAL TESTS:  Studies reviewed. Hematology Oncology Follow-up    INTERVAL HPI/OVERNIGHT EVENTS:  No o/n events, patient resting comfortably.   No complaints at this time.    VITAL SIGNS:  T(F): 97.8 (21 @ 08:58)  HR: 70 (21 @ 08:58)  BP: 110/69 (21 @ 08:58)  RR: 18 (21 @ 08:58)  SpO2: 97% (21 @ 08:58)  Wt(kg): --    21 @ 07:01  -  21 @ 07:00  --------------------------------------------------------  IN: 1680 mL / OUT: 675 mL / NET: 1005 mL    21 @ 07:01  -  21 @ 13:07  --------------------------------------------------------  IN: 0 mL / OUT: 300 mL / NET: -300 mL          Review of Systems:  General: denies fevers/chills  Respiratory: denies cough, shortness of breath  Cardiovascular: denies chest pain, palpitations  Gastrointestinal: denies nausea, vomiting, abdominal pain, constipation, diarrhea, melena, hematochezia  MSK: denies joint pain or muscle pain  Neuro: denies headache, weakness, or parasthesias  Skin: denies rash, petichiae, echymoses  Psych: denies anxiety or sleep disturbances    PHYSICAL EXAM:  Constitutional: NAD  Respiratory: CTA b/l, symmetric chest rise, with normal respiratory effort  Cardiovascular: RRR  Gastrointestinal: soft, NTND  Extremities:  no edema  MSK: no obvious abnormalities  Neurological: Grossly intact  Skin: no rash, no echymoses, no petichiae  Psych: normal affect    MEDICATIONS  (STANDING):  iron sucrose IVPB 200 milliGRAM(s) IV Intermittent every 24 hours  lactated ringers. 500 milliLiter(s) (50 mL/Hr) IV Continuous <Continuous>  pantoprazole  Injectable 40 milliGRAM(s) IV Push daily    MEDICATIONS  (PRN):  acetaminophen   Tablet .. 650 milliGRAM(s) Oral every 6 hours PRN Mild Pain (1 - 3)  polyethylene glycol 3350 17 Gram(s) Oral daily PRN Constipation      No Known Allergies      LABS:                        8.1    4.47  )-----------( 109      ( 04 May 2021 07:21 )             29.3     05-04    140  |  103  |  10  ----------------------------<  80  3.8   |  22  |  0.94    Ca    9.6      04 May 2021 07:22  Phos  3.4     05-04  Mg     2.3     05-04    TPro  7.3  /  Alb  4.5  /  TBili  1.4<H>  /  DBili  x   /  AST  17  /  ALT  17  /  AlkPhos  53  05-04    PT/INR - ( 04 May 2021 07:19 )   PT: 12.7 sec;   INR: 1.06 ratio         PTT - ( 04 May 2021 07:19 )  PTT:40.1 sec   Urinalysis Basic - ( 02 May 2021 18:32 )    Color: Light Yellow / Appearance: Clear / S.013 / pH: x  Gluc: x / Ketone: Negative  / Bili: Negative / Urobili: Negative   Blood: x / Protein: Negative / Nitrite: Negative   Leuk Esterase: Negative / RBC: 1 /hpf / WBC 0 /HPF   Sq Epi: x / Non Sq Epi: 0 /hpf / Bacteria: Negative        Ferritin, Serum: 2 ng/mL *L* (21 @ 15:32)  Iron - Total Binding Capacity.: 481 ug/dL *H* (21 @ 15:19)  % Saturation, Iron: 3 % *L* (21 @ 15:19)  Iron Total, Serum: 13 ug/dL *L* (21 @ 15:19)  Unsaturated Iron Binding Capacity: 468 ug/dL *H* (21 @ 15:19)    Folate, Serum: 14.9 ng/mL (21 @ 18:23)  Vitamin B12, Serum: 363 pg/mL (21 @ 18:23)          RADIOLOGY & ADDITIONAL TESTS:  Studies reviewed.

## 2021-05-04 NOTE — MEDICAL STUDENT PROGRESS NOTE(EDUCATION) - SUBJECTIVE AND OBJECTIVE BOX
PROGRESS NOTE:   ************************************************  Authored by: Cedric Etienne, MS3  *************************************************    40y Male w/ hx of brain germinoma s/p chemo and radiation (), hemochromatosis requiring phlebotomies, and thalassemia presenting with L leg weakness/ worsening foot drop and microcytic anemia (hgb 6.1 at admission).      INTERVAL/OVERNIGHT EVENTS:   - No acute events overnight.   - Patient says he feels much better after pRBC transfusion yesterday (now s/p 2 uPRBCs). He says he is "less tired". He says his left leg weakness persists without change. He was able to get out of bed some yesterday with PT. He denies any new symptoms, including N/V, chest pain, shortness of breath, overt bleeding, hematochezia, or hematuria.     [x] History per: patient    [x] There are no updates to the medical, surgical, social or family history unless described:    Review of Systems: History Per:   General: [ ] Neg  Pulmonary: [ ] Neg  Cardiac: [ ] Neg  Gastrointestinal: [ ] Neg  Ears, Nose, Throat: [ ] Neg  Renal/Urologic: [ ] Neg  Musculoskeletal: [ ] Neg  Endocrine: [ ] Neg  Hematologic: [ ] Neg  Neurologic: [ ] Neg  Allergy/Immunologic: [ ] Neg  All other systems reviewed and negative [x]     MEDICATIONS  (STANDING):  lactated ringers. 500 milliLiter(s) (50 mL/Hr) IV Continuous <Continuous>  pantoprazole  Injectable 40 milliGRAM(s) IV Push daily    MEDICATIONS  (PRN):  acetaminophen   Tablet .. 650 milliGRAM(s) Oral every 6 hours PRN Mild Pain (1 - 3)  polyethylene glycol 3350 17 Gram(s) Oral daily PRN Constipation    Allergies    No Known Allergies    Intolerances      DIET:     PHYSICAL EXAM  Vital Signs Last 24 Hrs  T(C): 37.1 (04 May 2021 05:21), Max: 37.1 (04 May 2021 05:21)  T(F): 98.7 (04 May 2021 05:21), Max: 98.7 (04 May 2021 05:21)  HR: 68 (04 May 2021 05:21) (64 - 84)  BP: 115/74 (04 May 2021 05:21) (106/66 - 115/74)  BP(mean): --  RR: 18 (04 May 2021 05:21) (18 - 18)  SpO2: 99% (04 May 2021 05:21) (97% - 100%)    PATIENT CARE ACCESS DEVICES  [x] Peripheral IV  [ ] Central Venous Line, Date Placed:		Site/Device:  [ ] PICC, Date Placed:  [ ] Urinary Catheter, Date Placed:  [ ] Necessity of urinary, arterial, and venous catheters discussed    I&O's Summary    03 May 2021 07:01  -  04 May 2021 07:00  --------------------------------------------------------  IN: 1680 mL / OUT: 675 mL / NET: 1005 mL        Daily   BMI (kg/m2): 21.5 (-02 @ 11:08)    VS reviewed, stable.  Gen: patient is alert, smiling, interactive, well appearing, no acute distress including pain  HEENT: NC/AT, pupils equal, responsive, reactive to light and accomodation, +non-fatiguable nystagmus on horizontal end gaze (pt has always had trouble "focusing" on objects); visual fields full; no conjunctivitis or scleral icterus; no nasal discharge or congestion. OP without exudates/erythema; +tongue fasciculations  Neck: FROM, supple, no cervical LAD  Chest: CTA b/l, no crackles/wheezes, good air entry, no tachypnea or retractions  CV: regular rate and rhythm, no murmurs   Abd: soft, nontender, nondistended, no HSM appreciated, +BS  : deferred  Extrem: No joint effusion or tenderness; FROM of all joints; no deformities or erythema noted. 2+ peripheral pulses, WWP.   Neuro: CN II-XII intact. Strength in B/L UEs 5/5; Strength in RLE 5/5; Left dorsi/plantar flexion 4/5-- other LLE 5/5 strength; sensation intact and equal in b/l LEs and b/l UEs. High stepping gate with left foot; +babinski on left    INTERVAL LAB RESULTS:                         8.1    4.47  )-----------( 109      ( 04 May 2021 07:21 )             29.3                         8.1    4.40  )-----------( 113      ( 03 May 2021 16:55 )             29.5                         6.7    3.72  )-----------( 104      ( 03 May 2021 07:45 )             24.3                               140    |  103    |  10                  Calcium: 9.6   / iCa: x      ( @ 07:22)    ----------------------------<  80        Magnesium: 2.3                              3.8     |  22     |  0.94             Phosphorous: 3.4      TPro  7.3    /  Alb  4.5    /  TBili  1.4    /  DBili  x      /  AST  17     /  ALT  17     /  AlkPhos  53     04 May 2021 07:22    Urinalysis Basic - ( 02 May 2021 18:32 )    Color: Light Yellow / Appearance: Clear / S.013 / pH: x  Gluc: x / Ketone: Negative  / Bili: Negative / Urobili: Negative   Blood: x / Protein: Negative / Nitrite: Negative   Leuk Esterase: Negative / RBC: 1 /hpf / WBC 0 /HPF   Sq Epi: x / Non Sq Epi: 0 /hpf / Bacteria: Negative          INTERVAL IMAGING STUDIES:  < from: CT Abdomen and Pelvis w/ IV Cont (21 @ 15:52) >  IMPRESSION:  No evidence of malignancy.  Moderate splenomegaly.    < end of copied text >  < from: CT Head No Cont (21 @ 12:03) >  IMPRESSION:Mild sulcal prominence suggesting mild volume loss which is nonspecific but may be related to chronic illness or certain medications. No masses. No change since 3/29/2012.    < end of copied text >

## 2021-05-04 NOTE — PROGRESS NOTE ADULT - PROBLEM SELECTOR PLAN 2
- 1wk of L leg weakness with worse foot drop and gait difficulty. foot drop present on gait exam. pt had L foot drop since germinoma in 2000 and had high steppage but over last week does not have strength for this and dragging L foot.    - possible cord compression (though foot drop is lower motor neuron) in setting of germinoma with lost to follow up for few years without imaging. no incontinence, weakness of R leg. possible ALS.   plan  - f/u MRI lumbar spine. f/u MRI brain for recurrence of germinoma/ other lesions.  - neuro was consulted in ED - f/u final recs. - 1wk of L leg weakness with worse foot drop and gait difficulty. foot drop present on gait exam. pt had L foot drop since germinoma in 2000 and had high steppage but over last week does not have strength for this and dragging L foot.    - possible cord compression (though foot drop is lower motor neuron) in setting of germinoma with lost to follow up for few years without imaging. no incontinence, weakness of R leg. possible ALS.   plan  - f/u MRI lumbar spine. f/u MRI brain - shows unchanged encephalomalacia and gliosis within the genuine of the corpus callosum. Increased signal intensity along the dorsal medulla without enhancement is nonspecific in appearance. Increasing number of foci of elevated signal intensity within the subcortical and periventricular white matter. Differential diagnosis includes ischemic, infectious/inflammatory and demyelinating processes. Cervical spine MRI with contrast may be valuable for further evaluation. New small inferior left frontal meningioma 3.5 x 8.4 mm. Lumbar spine MRI: Unremarkable.  - neuro was consulted in ED - f/u final recs.

## 2021-05-04 NOTE — PROGRESS NOTE ADULT - PROBLEM SELECTOR PLAN 7
- on admission plt 237 -> 110 -> 104 today  - no heparin exposure. possible exposure to pantoprazole.   - discontinue IV pantoprazole BID -> reduced to PO 40mg QD.

## 2021-05-04 NOTE — PROGRESS NOTE ADULT - PROBLEM SELECTOR PLAN 1
- admitted with hb 6.1 (f/u baseline from PCP), MCV 50.7. RDW 22.9. smear with moderate hypochromia, target cells. PTT elevated to 42.1. s/p 2U pRBC since admission.   - per pt, has thalassemia and hemochromatosis. Citizen of [place] heritage. pt required blood transfusions during chemo. hx hemochromatosis requiring phlebotomy every month for few years (confirmed with PCP), last done ~3 years ago per pt.   - FH - mother with thalassemia but never needed blood transfusion.  - iron panel consistent with severe JAS: serum iron 13, TIBC 481, ferritin 2.  LDH and haptoglobin not concerning for hemolysis. retic % 0.7 which is low in setting of anemia with hb <7 suggesting bone marrow suppression. HIV neg.   - CT AP 5/5  with moderate splenomegaly (15.3cm), otherwise normal.   - etiology of present anemia: possible GI malignancy vs myelodysplastic syndrome vs thalassemia. no bleeding reported by pt (no melena/hematoschezia, no hemoptysis/hematemesis, no hematuria). However, pos FOBT and severe JAS w ferritin 2 concerning for possible GI malignancy. severe microcytic anemia concerning for thalassemia (pt reports he has hx but unclear) though anemia is new (no records available per PCP given pt has not followed up in few years). possible MDS given anemia, could have acquired thalassemia (hemoglobin H) causing anemia.   plan  - GI consulted for endoscopy/colonoscopy to evaluate for malignancy/GI bleed. NPO after MN (clear liquid today).   - f/u hemoglobin electropheresis  - f/u lead, zn, heavy metal poison  - f/u celiac labs (IgM, anti-transglutaminase) - IgM elevated suggesting infection. - admitted with hb 6.1 (f/u baseline from PCP), MCV 50.7. RDW 22.9. smear with moderate hypochromia, target cells. PTT elevated to 42.1. s/p 2U pRBC since admission.   - per pt, has thalassemia and hemochromatosis. [place] heritage. pt required blood transfusions during chemo. hx hemochromatosis requiring phlebotomy every month for few years (confirmed with PCP), last done ~3 years ago per pt.   - FH - mother with thalassemia but never needed blood transfusion.  - iron panel consistent with severe JAS: serum iron 13, TIBC 481, ferritin 2.  LDH and haptoglobin not concerning for hemolysis. retic % 0.7 which is low in setting of anemia with hb <7 suggesting bone marrow suppression. HIV neg.   - CT AP 5/5  with moderate splenomegaly (15.3cm), otherwise normal. DDX of splenomegaly -   - etiology of present anemia: possible GI malignancy vs myelodysplastic syndrome vs thalassemia. no bleeding reported by pt (no melena/hematoschezia, no hemoptysis/hematemesis, no hematuria). However, pos FOBT and severe JAS w ferritin 2 concerning for possible GI malignancy. severe microcytic anemia concerning for thalassemia (pt reports he has hx but unclear) though anemia is new (no records available per PCP given pt has not followed up in few years). possible MDS given anemia, could have acquired thalassemia (hemoglobin H) causing anemia.   plan  - GI consulted for endoscopy/colonoscopy to evaluate for malignancy/GI bleed. NPO after MN (clear liquid today).   - f/u hemoglobin electropheresis  - f/u lead, zn, heavy metal poison  - f/u celiac labs (IgM, anti-transglutaminase) - IgM elevated suggesting infection. - admitted with hb 6.1 (f/u baseline from PCP), MCV 50.7. RDW 22.9. smear with moderate hypochromia, target cells. PTT elevated to 42.1. s/p 2U pRBC since admission.   - per pt, has thalassemia and hemochromatosis. [place] heritage. pt required blood transfusions during chemo. hx hemochromatosis requiring phlebotomy every month for few years (confirmed with PCP), last done ~3 years ago per pt.   - FH - mother with thalassemia but never needed blood transfusion.  - iron panel consistent with severe JAS: serum iron 13, TIBC 481, ferritin 2.  LDH and haptoglobin not concerning for hemolysis. retic % 0.7 which is low in setting of anemia with hb <7 suggesting bone marrow suppression. HIV neg.   - CT AP 5/5  with moderate splenomegaly (15.3cm), otherwise normal. DDX of splenomegaly - myeloproliferative vs thalassemia. no infectious sx though IgM elevated.   - etiology of present anemia: possible GI malignancy vs myelodysplastic syndrome vs thalassemia. no bleeding reported by pt (no melena/hematoschezia, no hemoptysis/hematemesis, no hematuria). However, pos FOBT and severe JAS w ferritin 2 concerning for possible GI malignancy. severe microcytic anemia concerning for thalassemia (pt reports he has hx but unclear) though anemia is new (no records available per PCP given pt has not followed up in few years). possible MDS given anemia, could have acquired thalassemia (hemoglobin H) causing anemia. will test for celiac leading to JAS.   plan  - GI consulted for endoscopy/colonoscopy to evaluate for malignancy/GI bleed. NPO after MN (clear liquid today).   - f/u hemoglobin electropheresis  - f/u lead - wnl, zn, heavy metal poison  - f/u celiac labs (IgM, anti-transglutaminase) - IgM elevated suggesting infection and not supporting celiac. - admitted with hb 6.1 (f/u baseline from PCP), MCV 50.7. RDW 22.9. smear with moderate hypochromia, target cells. PTT elevated to 42.1. s/p 2U pRBC since admission.   - per pt, has thalassemia and hemochromatosis. [place] heritage. pt required blood transfusions during chemo. hx hemochromatosis requiring phlebotomy every month for few years (confirmed with PCP), last done ~3 years ago per pt.   - FH - mother with thalassemia but never needed blood transfusion.  - iron panel consistent with severe JAS: serum iron 13, TIBC 481, ferritin 2.  LDH and haptoglobin not concerning for hemolysis. retic % 0.7 which is low in setting of anemia with hb <7 suggesting bone marrow suppression. HIV neg.   - CT AP 5/5  with moderate splenomegaly (15.3cm), otherwise normal. DDX of splenomegaly - myeloproliferative vs thalassemia. no infectious sx though IgM elevated.   - etiology of present anemia: possible GI malignancy vs myelodysplastic syndrome vs thalassemia. no bleeding reported by pt (no melena/hematoschezia, no hemoptysis/hematemesis, no hematuria). However, pos FOBT and severe JAS w ferritin 2 concerning for possible GI malignancy. severe microcytic anemia concerning for thalassemia (pt reports he has hx but unclear) though anemia is new (no records available per PCP given pt has not followed up in few years). possible MDS given anemia, could have acquired thalassemia (hemoglobin H) causing anemia. will test for celiac leading to JAS.   plan  - s/p 2U pRBC 5/2 and 5/3. started 3d venofer 200 QD 5/4 per heme onc recs. discussed role of bone marrow biopsy - heme onc will follow up.   - GI consulted for endoscopy/colonoscopy to evaluate for malignancy/GI bleed. NPO after MN (clear liquid today).   - f/u hemoglobin electropheresis  - f/u lead - wnl, zn, heavy metal poison  - f/u celiac labs (IgM, anti-transglutaminase) - IgM elevated suggesting infection and not supporting celiac. - admitted with hb 6.1 (f/u baseline from PCP), MCV 50.7. RDW 22.9. smear with moderate hypochromia, target cells. PTT elevated to 42.1. s/p 2U pRBC since admission.   - per pt, has thalassemia and hemochromatosis. [place] heritage. pt required blood transfusions during chemo. hx hemochromatosis requiring phlebotomy every month for few years (confirmed with PCP), last done ~3 years ago per pt.   - FH - mother with thalassemia but never needed blood transfusion.  - iron panel consistent with severe JAS: serum iron 13, TIBC 481, ferritin 2.  LDH and haptoglobin not concerning for hemolysis. retic % 0.7 which is low in setting of anemia with hb <7 suggesting bone marrow suppression. HIV neg.   - CT AP 5/5  with moderate splenomegaly (15.3cm), otherwise normal. DDX of splenomegaly - myeloproliferative vs thalassemia. no infectious sx though IgM elevated.   - etiology of present anemia: possible GI malignancy vs myelodysplastic syndrome vs thalassemia. no bleeding reported by pt (no melena/hematoschezia, no hemoptysis/hematemesis, no hematuria). However, pos FOBT and severe JAS w ferritin 2 concerning for possible GI malignancy. severe microcytic anemia concerning for thalassemia (pt reports he has hx but unclear) though anemia is new (no records available per PCP given pt has not followed up in few years). possible MDS given anemia, could have acquired thalassemia (hemoglobin H) causing anemia. will test for celiac leading to JAS.   plan  - s/p 2U pRBC 5/2 and 5/3. started 3d venofer 200 QD 5/4 per heme onc recs. discussed role of bone marrow biopsy - heme onc will follow up.   - GI consulted for endoscopy/colonoscopy to evaluate for malignancy/GI bleed. NPO after MN (clear liquid today).   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 - admitted with hb 6.1 (f/u baseline from PCP), MCV 50.7. RDW 22.9. smear with moderate hypochromia, target cells. PTT elevated to 42.1. s/p 2U pRBC since admission.   - per pt, has thalassemia and hemochromatosis. [place] heritage. pt required blood transfusions during chemo. hx hemochromatosis requiring phlebotomy every month for few years (confirmed with PCP), last done ~3 years ago per pt.   - FH - mother with thalassemia but never needed blood transfusion.  - iron panel consistent with severe JAS: serum iron 13, TIBC 481, ferritin 2.  LDH and haptoglobin not concerning for hemolysis. retic % 0.7 which is low in setting of anemia with hb <7 suggesting bone marrow suppression. HIV neg.   - CT AP 5/5  with moderate splenomegaly (15.3cm), otherwise normal. DDX of splenomegaly - myeloproliferative vs thalassemia. no infectious sx though IgM elevated.   - etiology of present anemia: possible GI malignancy vs myelodysplastic syndrome vs thalassemia. no bleeding reported by pt (no melena/hematoschezia, no hemoptysis/hematemesis, no hematuria). However, pos FOBT and severe JAS w ferritin 2 concerning for possible GI malignancy. severe microcytic anemia concerning for thalassemia (pt reports he has hx but unclear) though anemia is new (no records available per PCP given pt has not followed up in few years). possible MDS given anemia, could have acquired thalassemia (hemoglobin H) causing anemia. will test for celiac leading to JAS.   plan  - s/p 2U pRBC 5/2 and 5/3. started 1 day venofer 200 5/4 per heme onc recs. discussed role of bone marrow biopsy - heme onc will follow up.   - GI consulted for endoscopy/colonoscopy to evaluate for malignancy/GI bleed. NPO after MN (clear liquid today).   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

## 2021-05-04 NOTE — PROGRESS NOTE ADULT - PROBLEM SELECTOR PLAN 6
leukopenia with wbc 3.11 on admission. diff wnl. today wnl.   - possible bone marrow suppression vs malignancy. afebrile, no infection sx including congestion/cough/fevers/chills/diarrhea. less likely infection.   plan  - CTM daily CBC  - f/u heme onc recs

## 2021-05-04 NOTE — PROGRESS NOTE ADULT - ATTENDING COMMENTS
-Neuro f/u regarding MRI brain findings. -Will also pursue MRI C and T spine. -Neuro checks. -?Need for LP.   -GI recs appreciated; planning for EGD/colonoscopy hopefully tomorrow.   -CT abd/pelvis showed splenomegaly. -Will send EBV, CMV, RICHARD, RF, Parvovirus. -SPEP, DYAN, UPEP.   -F/u MRI abdomen to eval for hemochromatosis. -Will send genetic testing too.   -Heme recs appreciated. -Will give some IV Venofer for severe JAS. -F/u with heme regarding ?need for BM Bx.   -Factor deficiencies noted.

## 2021-05-04 NOTE — PROGRESS NOTE ADULT - ASSESSMENT
40 yoM, PMHx of germinoma s/p chemo/radiation around 2000, presenting with new LLE weakness for 5 days. Has known family history of thalassemia with reported baseline Hb of 11 many years ago. Pt also reporting h/o ?hemochromatosis. Hematology was consulted for microcytic hypochromic anemia found on the lab. Pt is hemodynamically stable.    # microcytic hypochromic anemia   - H/H 6.1/22.9 with MCV of 50.7, microcytic hypochromic. RDW 22.9  - Suspecting genetic condition such as thalassemia as underlying, recommend obtaining previous CBC result from the PCP to check the baseline  - Peripheral smear reviewed: Many target cells with a few tear drop cells, generally red cells are very hypochromic with enlarged central pallor, indicating underlying iron deficiency. Poikilocytosis with different RBC morphologies.  - FOBT + and labs suggestive of iron def anemia  - iron deficit ~1300mg, s/p 2U PRBC  - recommend ***  - GI for endoscopy/colonoscopy     # coagulopathy  - Elevated PTT at 42.1 without known exposure to heparin product  - mixing studies corrected, factor VIII and IX low   - recommend ***    #?hx hemochromatosis  - recommend non-urgent T2 weighted MRI to eval for hemochromatosis   40 yoM, PMHx of germinoma s/p chemo/radiation around 2000, presenting with new LLE weakness for 5 days. Has known family history of thalassemia with reported baseline Hb of 11 many years ago. Pt also reporting h/o ?hemochromatosis. Hematology was consulted for microcytic hypochromic anemia found on the lab. Pt is hemodynamically stable.    # microcytic hypochromic anemia   - H/H 6.1/22.9 with MCV of 50.7, microcytic hypochromic. RDW 22.9  - Suspecting genetic condition such as thalassemia as underlying, recommend obtaining previous CBC result from the PCP to check the baseline  - Peripheral smear reviewed: Many target cells with a few tear drop cells, generally red cells are very hypochromic with enlarged central pallor, indicating underlying iron deficiency. Poikilocytosis with different RBC morphologies.  - FOBT + and labs suggestive of iron def anemia  - iron deficit ~1300mg, s/p 2U PRBC  - recommend iron sucrose 200mg x1  - f/u GI for endoscopy/colonoscopy     # coagulopathy  - Elevated PTT at 42.1 without known exposure to heparin product  - mixing studies corrected, factor VIII and IX low   - recommend ****    #?hx hemochromatosis  - recommend non-urgent T2 weighted MRI to eval for hemochromatosis  - recommend send HFE genetic testing for hereditary hemochromatosis    40 yoM, PMHx of germinoma s/p chemo/radiation around 2000, presenting with new LLE weakness for 5 days. Has known family history of thalassemia with reported baseline Hb of 11 many years ago. Pt also reporting h/o ?hemochromatosis. Hematology was consulted for microcytic hypochromic anemia found on the lab. Pt is hemodynamically stable.    # microcytic hypochromic anemia   - H/H 6.1/22.9 with MCV of 50.7, microcytic hypochromic. RDW 22.9  - Suspecting genetic condition such as thalassemia as underlying, recommend obtaining previous CBC result from the PCP to check the baseline  - Peripheral smear reviewed: Many target cells with a few tear drop cells, generally red cells are very hypochromic with enlarged central pallor, indicating underlying iron deficiency. Poikilocytosis with different RBC morphologies.  - FOBT + and labs suggestive of iron def anemia  - iron deficit ~1300mg, s/p 2U PRBC  - recommend iron sucrose 200mg x1  - f/u GI for endoscopy/colonoscopy     # coagulopathy  - Elevated PTT at 42.1 without known exposure to heparin product  - mixing studies corrected, factor VIII and IX low   - can consider DDAVP prior to endoscopy/colonoscopy     #?hx hemochromatosis  - recommend non-urgent T2 weighted MRI to eval for hemochromatosis  - recommend send HFE genetic testing for hereditary hemochromatosis

## 2021-05-04 NOTE — PROGRESS NOTE ADULT - ASSESSMENT
40M w/ hx of germinoma s/p chemo and XRT, prior hx of hemochromatosis requiring phlebotomy (none for last 3 years), FHx of thalassemia in mother, presenting w/ food drop, found to have severe iron deficiency anemia w/ Hb 6, ferritin of 2, MCV 50s.    Impression:  #Severe microcytic anemia w/ severe iron deficiency - hgb stable. No GI bleeding. Likely hematologically driven from thalassemia but cannot r/o additional component of GI losses from gastric or colorectal cancers, though pt has no signs on history pointing towards this.  #Germinoma brain s/p chemo and XRT  #Hx of hemochromatosis requiring phlebotomy    Recommendations:  - f/u heme reccs  - pending electropharesis, can plan for EGD/colonoscopy tmro  - keep on CLD today  - NPO at midnight  - bowel prep to be ordered by fellow  - supportive care  - Trend Hb, transfuse to Hb > 7. 40M w/ hx of germinoma s/p chemo and XRT, prior hx of hemochromatosis requiring phlebotomy (none for last 3 years), FHx of thalassemia in mother, presenting w/ food drop, found to have severe iron deficiency anemia w/ Hb 6, ferritin of 2, MCV 50s.    Impression:  #Severe microcytic anemia w/ severe iron deficiency - hgb stable. No GI bleeding. Likely hematologically driven from thalassemia but cannot r/o additional component of GI losses from gastric or colorectal cancers, though pt has no signs on history pointing towards this.  #Germinoma brain s/p chemo and XRT  #Hx of hemochromatosis requiring phlebotomy  # Abnormal MR imaging    Recommendations:  - f/u heme reccs, pending electrophoresis  - diet as tolerated   - supportive care  - once clear from neurologic standpoint given acute findings, can consider EGD/colonoscopy for anemia evaluation  - please call GI with further questions 40M w/ hx of germinoma s/p chemo and XRT, prior hx of hemochromatosis requiring phlebotomy (none for last 3 years), FHx of thalassemia in mother, presenting w/ food drop, found to have severe iron deficiency anemia w/ Hb 6, ferritin of 2, MCV 50s.    Impression:  #Severe microcytic anemia w/ severe iron deficiency - hgb stable. No GI bleeding. Likely hematologically driven from thalassemia but cannot r/o additional component of GI losses from gastric or colorectal cancers, though pt has no signs on history pointing towards this.  #Germinoma brain s/p chemo and XRT  #Hx of hemochromatosis requiring phlebotomy  # Abnormal MR imaging    Recommendations:  - diet as tolerated   - supportive care  - once clear from neurologic standpoint given acute findings, will plan for EGD/colonoscopy for anemia evaluation  - please call GI with further questions    GI iphone: [phone]  GI e-mail: [email or web address]

## 2021-05-04 NOTE — PROGRESS NOTE ADULT - PROBLEM SELECTOR PLAN 3
- hx germinoma (brain, specific location need to obtain records from [facility]) dx in 2000 s/p chemo/radiation in [id number]. dx when he presented with L foot drop/weakness.   - used to see neuro at [facility], lost to follow up for last few years (pt says because of "pandemic")  plan  - MRI brain as above   - heme onc on board - f/u additional recs

## 2021-05-05 ENCOUNTER — TRANSCRIPTION ENCOUNTER (OUTPATIENT)
Age: 41
End: 2021-05-05

## 2021-05-05 DIAGNOSIS — R79.9 ABNORMAL FINDING OF BLOOD CHEMISTRY, UNSPECIFIED: ICD-10-CM

## 2021-05-05 LAB
ALBUMIN SERPL ELPH-MCNC: 5 G/DL — SIGNIFICANT CHANGE UP (ref 3.3–5)
ALP SERPL-CCNC: 60 U/L — SIGNIFICANT CHANGE UP (ref 40–120)
ALT FLD-CCNC: 18 U/L — SIGNIFICANT CHANGE UP (ref 10–45)
ANA TITR SER: NEGATIVE — SIGNIFICANT CHANGE UP
ANION GAP SERPL CALC-SCNC: 15 MMOL/L — SIGNIFICANT CHANGE UP (ref 5–17)
APTT BLD: 43.2 SEC — HIGH (ref 27.5–35.5)
AST SERPL-CCNC: 20 U/L — SIGNIFICANT CHANGE UP (ref 10–40)
B19V IGG SER-ACNC: >9.99 INDEX — HIGH (ref 0–0.9)
B19V IGG+IGM SER-IMP: POSITIVE
B19V IGG+IGM SER-IMP: SIGNIFICANT CHANGE UP
B19V IGM FLD-ACNC: 0.15 INDEX — SIGNIFICANT CHANGE UP (ref 0–0.9)
B19V IGM SER-ACNC: NEGATIVE — SIGNIFICANT CHANGE UP
BASOPHILS # BLD AUTO: 0.01 K/UL — SIGNIFICANT CHANGE UP (ref 0–0.2)
BASOPHILS NFR BLD AUTO: 0.3 % — SIGNIFICANT CHANGE UP (ref 0–2)
BILIRUB SERPL-MCNC: 1.4 MG/DL — HIGH (ref 0.2–1.2)
BLD GP AB SCN SERPL QL: NEGATIVE — SIGNIFICANT CHANGE UP
BUN SERPL-MCNC: 11 MG/DL — SIGNIFICANT CHANGE UP (ref 7–23)
CALCIUM SERPL-MCNC: 9.7 MG/DL — SIGNIFICANT CHANGE UP (ref 8.4–10.5)
CHLORIDE SERPL-SCNC: 104 MMOL/L — SIGNIFICANT CHANGE UP (ref 96–108)
CK SERPL-CCNC: 41 U/L — SIGNIFICANT CHANGE UP (ref 30–200)
CMV IGG FLD QL: <0.2 U/ML — SIGNIFICANT CHANGE UP
CMV IGG SERPL-IMP: NEGATIVE — SIGNIFICANT CHANGE UP
CMV IGM FLD-ACNC: 13.5 AU/ML — SIGNIFICANT CHANGE UP
CMV IGM SERPL QL: NEGATIVE — SIGNIFICANT CHANGE UP
CO2 SERPL-SCNC: 22 MMOL/L — SIGNIFICANT CHANGE UP (ref 22–31)
CREAT SERPL-MCNC: 1.02 MG/DL — SIGNIFICANT CHANGE UP (ref 0.5–1.3)
CRP SERPL-MCNC: <3 MG/L — SIGNIFICANT CHANGE UP (ref 0–4)
EBV EA AB SER IA-ACNC: <5 U/ML — SIGNIFICANT CHANGE UP
EBV EA AB TITR SER IF: ABNORMAL
EBV EA IGG SER-ACNC: NEGATIVE — SIGNIFICANT CHANGE UP
EBV NA IGG SER IA-ACNC: 18.7 U/ML — HIGH
EBV PATRN SPEC IB-IMP: SIGNIFICANT CHANGE UP
EBV VCA IGG AVIDITY SER QL IA: POSITIVE
EBV VCA IGM SER IA-ACNC: 17 U/ML — SIGNIFICANT CHANGE UP
EBV VCA IGM SER IA-ACNC: >750 U/ML — HIGH
EBV VCA IGM TITR FLD: NEGATIVE — SIGNIFICANT CHANGE UP
EOSINOPHIL # BLD AUTO: 0.12 K/UL — SIGNIFICANT CHANGE UP (ref 0–0.5)
EOSINOPHIL NFR BLD AUTO: 3.4 % — SIGNIFICANT CHANGE UP (ref 0–6)
ERYTHROCYTE [SEDIMENTATION RATE] IN BLOOD: 13 MM/HR — SIGNIFICANT CHANGE UP (ref 0–15)
FACT XIIA PPP-ACNC: 94 % — SIGNIFICANT CHANGE UP (ref 45–150)
GLUCOSE SERPL-MCNC: 93 MG/DL — SIGNIFICANT CHANGE UP (ref 70–99)
HAPTOGLOB SERPL-MCNC: 65 MG/DL — SIGNIFICANT CHANGE UP (ref 34–200)
HCT VFR BLD CALC: 30.9 % — LOW (ref 39–50)
HGB BLD-MCNC: 8.6 G/DL — LOW (ref 13–17)
IMM GRANULOCYTES NFR BLD AUTO: 0.3 % — SIGNIFICANT CHANGE UP (ref 0–1.5)
INR BLD: 1.13 RATIO — SIGNIFICANT CHANGE UP (ref 0.88–1.16)
LDH SERPL L TO P-CCNC: 175 U/L — SIGNIFICANT CHANGE UP (ref 50–242)
LYMPHOCYTES # BLD AUTO: 0.96 K/UL — LOW (ref 1–3.3)
LYMPHOCYTES # BLD AUTO: 27 % — SIGNIFICANT CHANGE UP (ref 13–44)
MAGNESIUM SERPL-MCNC: 2.2 MG/DL — SIGNIFICANT CHANGE UP (ref 1.6–2.6)
MCHC RBC-ENTMCNC: 15.6 PG — LOW (ref 27–34)
MCHC RBC-ENTMCNC: 27.8 GM/DL — LOW (ref 32–36)
MCV RBC AUTO: 56 FL — LOW (ref 80–100)
MONOCYTES # BLD AUTO: 0.34 K/UL — SIGNIFICANT CHANGE UP (ref 0–0.9)
MONOCYTES NFR BLD AUTO: 9.6 % — SIGNIFICANT CHANGE UP (ref 2–14)
NEUTROPHILS # BLD AUTO: 2.11 K/UL — SIGNIFICANT CHANGE UP (ref 1.8–7.4)
NEUTROPHILS NFR BLD AUTO: 59.4 % — SIGNIFICANT CHANGE UP (ref 43–77)
NRBC # BLD: 0 /100 WBCS — SIGNIFICANT CHANGE UP (ref 0–0)
PHOSPHATE SERPL-MCNC: 2.7 MG/DL — SIGNIFICANT CHANGE UP (ref 2.5–4.5)
PLATELET # BLD AUTO: 119 K/UL — LOW (ref 150–400)
POTASSIUM SERPL-MCNC: 4 MMOL/L — SIGNIFICANT CHANGE UP (ref 3.5–5.3)
POTASSIUM SERPL-SCNC: 4 MMOL/L — SIGNIFICANT CHANGE UP (ref 3.5–5.3)
PROT SERPL-MCNC: 6.9 G/DL — SIGNIFICANT CHANGE UP (ref 6–8.3)
PROT SERPL-MCNC: 6.9 G/DL — SIGNIFICANT CHANGE UP (ref 6–8.3)
PROT SERPL-MCNC: 7.4 G/DL — SIGNIFICANT CHANGE UP (ref 6–8.3)
PROTHROM AB SERPL-ACNC: 13.5 SEC — SIGNIFICANT CHANGE UP (ref 10.6–13.6)
RBC # BLD: 5.52 M/UL — SIGNIFICANT CHANGE UP (ref 4.2–5.8)
RBC # FLD: 31 % — HIGH (ref 10.3–14.5)
RH IG SCN BLD-IMP: POSITIVE — SIGNIFICANT CHANGE UP
RHEUMATOID FACT SERPL-ACNC: <10 IU/ML — SIGNIFICANT CHANGE UP (ref 0–13)
SODIUM SERPL-SCNC: 141 MMOL/L — SIGNIFICANT CHANGE UP (ref 135–145)
T4 FREE SERPL-MCNC: 1 NG/DL — SIGNIFICANT CHANGE UP (ref 0.9–1.8)
TSH SERPL-MCNC: 6.79 UIU/ML — HIGH (ref 0.27–4.2)
URATE SERPL-MCNC: 4.8 MG/DL — SIGNIFICANT CHANGE UP (ref 3.4–8.8)
WBC # BLD: 3.55 K/UL — LOW (ref 3.8–10.5)
WBC # FLD AUTO: 3.55 K/UL — LOW (ref 3.8–10.5)

## 2021-05-05 PROCEDURE — 99232 SBSQ HOSP IP/OBS MODERATE 35: CPT | Mod: GC

## 2021-05-05 PROCEDURE — 72157 MRI CHEST SPINE W/O & W/DYE: CPT | Mod: 26

## 2021-05-05 PROCEDURE — 72156 MRI NECK SPINE W/O & W/DYE: CPT | Mod: 26

## 2021-05-05 RX ORDER — SOD SULF/SODIUM/NAHCO3/KCL/PEG
4000 SOLUTION, RECONSTITUTED, ORAL ORAL ONCE
Refills: 0 | Status: COMPLETED | OUTPATIENT
Start: 2021-05-05 | End: 2021-05-05

## 2021-05-05 RX ADMIN — PANTOPRAZOLE SODIUM 40 MILLIGRAM(S): 20 TABLET, DELAYED RELEASE ORAL at 05:16

## 2021-05-05 RX ADMIN — Medication 4000 MILLILITER(S): at 17:41

## 2021-05-05 NOTE — DISCHARGE NOTE PROVIDER - PROVIDER TOKENS
PROVIDER:[TOKEN:[[id number]],FOLLOWUP:[2 weeks],ESTABLISHEDPATIENT:[T]],PROVIDER:[TOKEN:[[id number]],FOLLOWUP:[2 weeks],ESTABLISHEDPATIENT:[T]],PROVIDER:[TOKEN:[[id number]],FOLLOWUP:[2 weeks]]

## 2021-05-05 NOTE — DISCHARGE NOTE PROVIDER - NSDCCPCAREPLAN_GEN_ALL_CORE_FT
PRINCIPAL DISCHARGE DIAGNOSIS  Diagnosis: Weakness of left lower extremity  Assessment and Plan of Treatment: You came to the hospital for left leg weakness and foot drop. Imaging was performed. MRI brain and cervical spine showed findings in the brain and the spinal cord concerning for central nervous system demyelinating disease such as Multiple Sclerosis. We also obtained cerebral spinal fluid to test for infections, malignancies  Correlate with symptomatology and CSF analysis. 2.0 cm right thyroid nodule. Recommend further evaluation by ultrasound. MRI cervical spine with contrast: Evidence of T2 hyperintensity in the right aspect of the cord at T11-12 as well as more inferiorly at T12-L1 level extending to the tip of the conus without associated enhancement or expansion of the cord likely chronic demyelinating pathology. Neuro was consulted, suspicion was raised for new MS. Pt was started on solumedrol 1g IV for 5 days (5/6-5/10). LP was performed 5/7. Gram stain and culture negative, CSF PCR negative. Cytology and cytopathology were sent. On day 3 of steroids pt noted improvement of gait and LLE weakness.      SECONDARY DISCHARGE DIAGNOSES  Diagnosis: Weakness of left lower extremity  Assessment and Plan of Treatment:      PRINCIPAL DISCHARGE DIAGNOSIS  Diagnosis: Weakness of left lower extremity  Assessment and Plan of Treatment: You came to the hospital for left leg weakness and foot drop. Imaging was performed. MRI brain and cervical spine showed findings in the brain and the spinal cord concerning for central nervous system demyelinating disease such as Multiple Sclerosis. We also obtained cerebral spinal fluid to test for infections, malignancies, among other things. Some of the studies have resulted and they were negative and insignificant for any findings; the remaining studies have not yet resulted and you will need to follow-up with the neurologist outpatient for these results. You were treated with 5 days of IV steroids and you had improvement of your leg weakness.  You will need to follow-up with the neurologist who is a Multiple Sclerosis specialist, Dr. Wendy Galo within 2 weeks of discharge at [address] [place] [address] [phone].      SECONDARY DISCHARGE DIAGNOSES  Diagnosis: Hemochromatosis  Assessment and Plan of Treatment: You have a history of hemochromatosis. It was unclear if you have hemochromatosis that is genetic or acquired. You will require follow-up with your hematologist. Tell your hematologist to consider to do a blood genetic testing for hemochromatosis.    Diagnosis: Anemia  Assessment and Plan of Treatment: You were found to have anemia.  We did a workup with the hematologists and we think that you have severe iron deficiency.  This is slightly unusual if you have hemochromatosis (iron overload disease).  We also thought that you could have a slow occult bleeding in your gut, and the gastroenterologist performed endoscopy and colonoscopy and found no sources of bleeding. They did find diverticulosis in the ascending colon, multiple fundic gland polyps, duodenum biopsied which was negative for celiac disease which can cause iron deficiency. There still may be bleeding from the part of the small bowel that is unreachable by endoscopy or colonoscopy. You can follow-up with your primary care doctor or one of your outpatient providers to get a capsule study done. 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  We started giving you iron supplementation, initially with IV iron and then with oral pills, which you should continue to take.  You will need to follow up with your hematologist.    Diagnosis: Right thyroid nodule  Assessment and Plan of Treatment: On imaging, we incidentally found a 2.0cm right thyroid nodule. You should discuss this with your primary care doctor.  We recommend further evaluation by ultrasound outpatient.    Diagnosis: Coagulopathy  Assessment and Plan of Treatment: You were found to have a prolonged bleeding time, specially a prolonged aPTT.  We found that you have a mild deficiency of coagulation factors 8 and 9.  Tell your hematologist about this finding and follow-up within the next 2 weeks.    Diagnosis: Vitamin D deficiency  Assessment and Plan of Treatment: You were found to have vitamin D deficiency, which may be related to Multiple Sclerosis.  Continue to take the vitamin D supplement (ergocalciferol) weekly on Saturdays.

## 2021-05-05 NOTE — PROGRESS NOTE ADULT - PROBLEM SELECTOR PLAN 9
- horizontal nystagmus with lateral eye movements - pt states has had before, is not sure cause.   - etiology: cerebellum lesion though no dysmetria and no dyskinesia. DVT ppx - SCD pending heme studies  diet - regular

## 2021-05-05 NOTE — PROGRESS NOTE ADULT - SUBJECTIVE AND OBJECTIVE BOX
Precious Reena PGY1    Patient is a 40y old  Male who presents with a chief complaint of Anemia (04 May 2021 12:16)      SUBJECTIVE / OVERNIGHT EVENTS:    MEDICATIONS  (STANDING):  lactated ringers. 500 milliLiter(s) (50 mL/Hr) IV Continuous <Continuous>  pantoprazole  Injectable 40 milliGRAM(s) IV Push daily  polyethylene glycol/electrolyte Solution. 4000 milliLiter(s) Oral once    MEDICATIONS  (PRN):  acetaminophen   Tablet .. 650 milliGRAM(s) Oral every 6 hours PRN Mild Pain (1 - 3)  polyethylene glycol 3350 17 Gram(s) Oral daily PRN Constipation      CAPILLARY BLOOD GLUCOSE        I&O's Summary    04 May 2021 07:01  -  05 May 2021 07:00  --------------------------------------------------------  IN: 1320 mL / OUT: 1700 mL / NET: -380 mL        Vital Signs Last 24 Hrs  T(C): 36.6 (05 May 2021 06:21), Max: 36.8 (04 May 2021 13:22)  T(F): 97.8 (05 May 2021 06:21), Max: 98.2 (04 May 2021 13:22)  HR: 71 (05 May 2021 06:21) (59 - 74)  BP: 113/69 (05 May 2021 06:21) (100/66 - 113/77)  BP(mean): --  RR: 18 (05 May 2021 06:21) (18 - 18)  SpO2: 98% (05 May 2021 06:21) (96% - 99%)    PHYSICAL EXAM:  GENERAL: no distress  PSYCH: A&O x3  HEAD: Atraumatic, Normocephalic  NECK: Supple, No JVD  CHEST/LUNG: clear to auscultation bilaterally  HEART: regular rate and rhythm, no murmurs  ABDOMEN: nontender to palpation, no rebound tenderness/guarding  EXTREMITIES: no edema on bilateral LE  NEUROLOGY: no focal neurologic deficit  SKIN: No rashes or lesions    LABS:                        8.1    4.47  )-----------( 109      ( 04 May 2021 07:21 )             29.3      05-04    140  |  103  |  10  ----------------------------<  80  3.8   |  22  |  0.94    Ca    9.6      04 May 2021 07:22  Phos  3.4     05-04  Mg     2.3     05-04    TPro  6.9  /  Alb  x   /  TBili  x   /  DBili  x   /  AST  x   /  ALT  x   /  AlkPhos  x   05-05    PT/INR - ( 04 May 2021 07:19 )   PT: 12.7 sec;   INR: 1.06 ratio         PTT - ( 04 May 2021 07:19 )  PTT:40.1 sec          RADIOLOGY & ADDITIONAL TESTS:    Imaging Personally Reviewed:    Consultant(s) Notes Reviewed:      Care Discussed with Consultants/Other Providers:   Precious Reena PGY1    Patient is a 40y old  Male who presents with a chief complaint of Anemia (04 May 2021 12:16)      SUBJECTIVE / OVERNIGHT EVENTS:  - overnight - no events  - am - pt did not complete bowel prep (less than half), had 1 bm this am. no abdominal pain. no chest pain/sob/LH. no improvement in LLE weakness per pt. no new weakness/tingling/numbness. explained results of CT abdomen/pelvis + MRI brain to pt.     MEDICATIONS  (STANDING):  lactated ringers. 500 milliLiter(s) (50 mL/Hr) IV Continuous <Continuous>  pantoprazole  Injectable 40 milliGRAM(s) IV Push daily  polyethylene glycol/electrolyte Solution. 4000 milliLiter(s) Oral once    MEDICATIONS  (PRN):  acetaminophen   Tablet .. 650 milliGRAM(s) Oral every 6 hours PRN Mild Pain (1 - 3)  polyethylene glycol 3350 17 Gram(s) Oral daily PRN Constipation      CAPILLARY BLOOD GLUCOSE        I&O's Summary    04 May 2021 07:01  -  05 May 2021 07:00  --------------------------------------------------------  IN: 1320 mL / OUT: 1700 mL / NET: -380 mL        Vital Signs Last 24 Hrs  T(C): 36.6 (05 May 2021 06:21), Max: 36.8 (04 May 2021 13:22)  T(F): 97.8 (05 May 2021 06:21), Max: 98.2 (04 May 2021 13:22)  HR: 71 (05 May 2021 06:21) (59 - 74)  BP: 113/69 (05 May 2021 06:21) (100/66 - 113/77)  BP(mean): --  RR: 18 (05 May 2021 06:21) (18 - 18)  SpO2: 98% (05 May 2021 06:21) (96% - 99%)    PHYSICAL EXAM:  GENERAL: lying down, appears comfortable on RA  HEAD:  Atraumatic, Normocephalic  EYES: extra ocular movements intact, horizontal nystagmus on lateral gaze  ENT: Moist mucous membranes  NECK: Supple  CHEST/LUNG: clear to auscultation bilaterally, no crackles/wheeze/ronchi  HEART: regular rate and rhythm, no murmurs  ABDOMEN: nontender abdomen, no masses palpated. no rebound/guarding.   EXTREMITIES:  no edema. able to move all extremities. no rashes.   NERVOUS SYSTEM: A&O x3, CN2-12 - horizontal nystagmus with lateral gaze bilaterally but EOMI , fasciculations in tongue +, otherwise CN2-12 wnl. 5/5  str bilateral UE. no dysmetria, no dyskinesia. no tremors at rest or action. no pronator drift, neg romberg.   - GAIT: prominent L foot drop, dragging L foot with each step.   MSK: FROM all 4 extremities, full and equal strength  SKIN: No rashes or lesions      LABS:                        8.1    4.47  )-----------( 109      ( 04 May 2021 07:21 )             29.3      05-04    140  |  103  |  10  ----------------------------<  80  3.8   |  22  |  0.94    Ca    9.6      04 May 2021 07:22  Phos  3.4     05-04  Mg     2.3     05-04    TPro  6.9  /  Alb  x   /  TBili  x   /  DBili  x   /  AST  x   /  ALT  x   /  AlkPhos  x   05-05    PT/INR - ( 04 May 2021 07:19 )   PT: 12.7 sec;   INR: 1.06 ratio         PTT - ( 04 May 2021 07:19 )  PTT:40.1 sec          RADIOLOGY & ADDITIONAL TESTS:    Imaging Personally Reviewed:    Consultant(s) Notes Reviewed:      Care Discussed with Consultants/Other Providers:   Precious Reena PGY1    Patient is a 40y old  Male who presents with a chief complaint of Anemia (04 May 2021 12:16)      SUBJECTIVE / OVERNIGHT EVENTS:  - overnight - no events  - am - pt did not complete bowel prep (less than half), had 1 bm this am. no abdominal pain. no chest pain/sob/LH. no improvement in LLE weakness per pt. no new weakness/tingling/numbness. explained results of CT abdomen/pelvis + MRI brain to pt.   - per heme onc Dr. Ortiz outpt, hb was 11.4 and MCV 65 jan 2019.     MEDICATIONS  (STANDING):  lactated ringers. 500 milliLiter(s) (50 mL/Hr) IV Continuous <Continuous>  pantoprazole  Injectable 40 milliGRAM(s) IV Push daily  polyethylene glycol/electrolyte Solution. 4000 milliLiter(s) Oral once    MEDICATIONS  (PRN):  acetaminophen   Tablet .. 650 milliGRAM(s) Oral every 6 hours PRN Mild Pain (1 - 3)  polyethylene glycol 3350 17 Gram(s) Oral daily PRN Constipation      CAPILLARY BLOOD GLUCOSE        I&O's Summary    04 May 2021 07:01  -  05 May 2021 07:00  --------------------------------------------------------  IN: 1320 mL / OUT: 1700 mL / NET: -380 mL        Vital Signs Last 24 Hrs  T(C): 36.6 (05 May 2021 06:21), Max: 36.8 (04 May 2021 13:22)  T(F): 97.8 (05 May 2021 06:21), Max: 98.2 (04 May 2021 13:22)  HR: 71 (05 May 2021 06:21) (59 - 74)  BP: 113/69 (05 May 2021 06:21) (100/66 - 113/77)  BP(mean): --  RR: 18 (05 May 2021 06:21) (18 - 18)  SpO2: 98% (05 May 2021 06:21) (96% - 99%)    PHYSICAL EXAM:  GENERAL: lying down, appears comfortable on RA  HEAD:  Atraumatic, Normocephalic  EYES: extra ocular movements intact, horizontal nystagmus on lateral gaze  ENT: Moist mucous membranes  NECK: Supple  CHEST/LUNG: clear to auscultation bilaterally, no crackles/wheeze/ronchi  HEART: regular rate and rhythm, no murmurs  ABDOMEN: nontender abdomen, no masses palpated. no rebound/guarding.   EXTREMITIES:  no edema. able to move all extremities. no rashes.   NERVOUS SYSTEM: A&O x3, CN2-12 - horizontal nystagmus with lateral gaze bilaterally but EOMI , fasciculations in tongue +, otherwise CN2-12 wnl. 5/5  str bilateral UE. no dysmetria, no dyskinesia. no tremors at rest or action. no pronator drift, neg romberg.   - GAIT: prominent L foot drop, dragging L foot with each step.   MSK: FROM all 4 extremities, full and equal strength  SKIN: No rashes or lesions      LABS:                        8.1    4.47  )-----------( 109      ( 04 May 2021 07:21 )             29.3      05-04    140  |  103  |  10  ----------------------------<  80  3.8   |  22  |  0.94    Ca    9.6      04 May 2021 07:22  Phos  3.4     05-04  Mg     2.3     05-04    TPro  6.9  /  Alb  x   /  TBili  x   /  DBili  x   /  AST  x   /  ALT  x   /  AlkPhos  x   05-05    PT/INR - ( 04 May 2021 07:19 )   PT: 12.7 sec;   INR: 1.06 ratio         PTT - ( 04 May 2021 07:19 )  PTT:40.1 sec          RADIOLOGY & ADDITIONAL TESTS:    Imaging Personally Reviewed:    Consultant(s) Notes Reviewed:      Care Discussed with Consultants/Other Providers:   Precious Valles PGY1    Patient is a 40y old  Male who presents with a chief complaint of Anemia (04 May 2021 12:16)      SUBJECTIVE / OVERNIGHT EVENTS:  - overnight - no events  - am - pt did not complete bowel prep (less than half), had 1 bm this am. no abdominal pain. no chest pain/sob/LH. no improvement in LLE weakness per pt. no new weakness/tingling/numbness. explained results of CT abdomen/pelvis + MRI brain to pt.   - per heme onc Dr. rOtiz outpt, hb was 11.4 and MCV 65 jan 2019.   - pt's sister brought in all MRI records and disc - neuro notified, and RN notified to scan into chart.     MEDICATIONS  (STANDING):  lactated ringers. 500 milliLiter(s) (50 mL/Hr) IV Continuous <Continuous>  pantoprazole  Injectable 40 milliGRAM(s) IV Push daily  polyethylene glycol/electrolyte Solution. 4000 milliLiter(s) Oral once    MEDICATIONS  (PRN):  acetaminophen   Tablet .. 650 milliGRAM(s) Oral every 6 hours PRN Mild Pain (1 - 3)  polyethylene glycol 3350 17 Gram(s) Oral daily PRN Constipation      CAPILLARY BLOOD GLUCOSE        I&O's Summary    04 May 2021 07:01  -  05 May 2021 07:00  --------------------------------------------------------  IN: 1320 mL / OUT: 1700 mL / NET: -380 mL        Vital Signs Last 24 Hrs  T(C): 36.6 (05 May 2021 06:21), Max: 36.8 (04 May 2021 13:22)  T(F): 97.8 (05 May 2021 06:21), Max: 98.2 (04 May 2021 13:22)  HR: 71 (05 May 2021 06:21) (59 - 74)  BP: 113/69 (05 May 2021 06:21) (100/66 - 113/77)  BP(mean): --  RR: 18 (05 May 2021 06:21) (18 - 18)  SpO2: 98% (05 May 2021 06:21) (96% - 99%)    PHYSICAL EXAM:  GENERAL: lying down, appears comfortable on RA  HEAD:  Atraumatic, Normocephalic  EYES: extra ocular movements intact, horizontal nystagmus on lateral gaze  ENT: Moist mucous membranes  NECK: Supple  CHEST/LUNG: clear to auscultation bilaterally, no crackles/wheeze/ronchi  HEART: regular rate and rhythm, no murmurs  ABDOMEN: nontender abdomen, no masses palpated. no rebound/guarding.   EXTREMITIES:  no edema. able to move all extremities. no rashes.   NERVOUS SYSTEM: A&O x3, CN2-12 - horizontal nystagmus with lateral gaze bilaterally but EOMI , fasciculations in tongue +, otherwise CN2-12 wnl. 5/5  str bilateral UE. no dysmetria, no dyskinesia. no tremors at rest or action. no pronator drift, neg romberg.   - GAIT: prominent L foot drop, dragging L foot with each step.   MSK: FROM all 4 extremities, full and equal strength  SKIN: No rashes or lesions      LABS:                        8.1    4.47  )-----------( 109      ( 04 May 2021 07:21 )             29.3      05-04    140  |  103  |  10  ----------------------------<  80  3.8   |  22  |  0.94    Ca    9.6      04 May 2021 07:22  Phos  3.4     05-04  Mg     2.3     05-04    TPro  6.9  /  Alb  x   /  TBili  x   /  DBili  x   /  AST  x   /  ALT  x   /  AlkPhos  x   05-05    PT/INR - ( 04 May 2021 07:19 )   PT: 12.7 sec;   INR: 1.06 ratio         PTT - ( 04 May 2021 07:19 )  PTT:40.1 sec          RADIOLOGY & ADDITIONAL TESTS:    Imaging Personally Reviewed:    Consultant(s) Notes Reviewed:      Care Discussed with Consultants/Other Providers:

## 2021-05-05 NOTE — PROGRESS NOTE ADULT - ASSESSMENT
Assessment: 40 year old male, PMH brain germinoma s/p chemo/rtx 2000 w/ serial MR surveillance showing resolution of tumor (last MRI 2012 within our EMR) presenting for LLE weakness. Patient states that the weakness started 5 days ago and has been constant. No pain or sensory changes are associated. Denies incontinence or lower back pain or recent traumas/falls. At baseline, he has L foot drag for which he compensates by putting more weight on RLE. On exam, 4/5 L hip flexion w/ 5/5 all throughout elsewhere. Hgb at ~6. CTH showed no evidence of gross mass or other acute pathologies. New onset weakness in the L hip flexion may be 2/2 to an MSK etiology, with the possibly of intracranial or spinal mass re-occurrence unable to be definitively ruled out, given that he stated similar LLE weakness occurred when germinoma was diagnosed. Anemia unlikely contributing factor given focality.       Plan:  []MRI brain w/, w/o  []MRI L-spine w/o   []PT/OT  []Fall precautions  []Hgb correction per primary team  []Further recommendations based on MR imaging   Assessment: 40 year old male, PMH brain germinoma s/p chemo/rtx 2000 w/ serial MR surveillance showing resolution of tumor (last MRI 2012 within our EMR) presenting for LLE weakness. Patient states that the weakness started 5 days ago and has been constant. No pain or sensory changes are associated. Denies incontinence or lower back pain or recent traumas/falls. At baseline, he has L foot drag for which he compensates by putting more weight on RLE. On exam, 4/5 L hip flexion, 4+/5 L hip abduction, 4+/5 L hip adduction, 4+/5 L dorsiflexion, w/ 5/5 all throughout elsewhere. Hgb at ~6. CTH showed no evidence of gross mass or other acute pathologies. New onset weakness in the L hip flexion may be 2/2 to an MSK etiology, with the possibly of intracranial or spinal mass re-occurrence unable to be definitively ruled out, given that he stated similar LLE weakness occurred when germinoma was diagnosed. Anemia unlikely contributing factor given focality. Brain MRI demonstrated Increased signal intensity along the dorsal medulla without enhancement is nonspecific in appearance. Increasing number of foci of elevated signal intensity within the subcortical and periventricular white matter.      Plan:  [x]MRI brain w/, w/o  [x]MRI L-spine w/o   []PT/OT  []Fall precautions  []Hgb correction per primary team  []Pt's sister is bringing in prior MRI, will compare old images with current ones and then decide on treatment plan

## 2021-05-05 NOTE — PROGRESS NOTE ADULT - PROBLEM SELECTOR PLAN 6
leukopenia with wbc 3.11 on admission. diff wnl. today wnl.   - possible bone marrow suppression vs malignancy. afebrile, no infection sx including congestion/cough/fevers/chills/diarrhea. less likely infection.   plan  - CTM daily CBC  - f/u heme onc recs leukopenia with wbc 3.11 on admission. diff wnl. today wnl.   - possible bone marrow suppression vs malignancy. afebrile, no infection sx including congestion/cough/fevers/chills/diarrhea. less likely infection.   plan  - CTM daily CBC  - f/u heme onc recs    #atrophic kidney  - likely 2/2 hx kidney stones requiring lithotripsy leukopenia with wbc 3.11 on admission. diff wnl. today wnl.   - possible bone marrow suppression vs malignancy. afebrile, no infection sx including congestion/cough/fevers/chills/diarrhea. less likely infection.   plan  - CTM daily CBC  - f/u heme onc recs    #atrophic kidney  - likely 2/2 hx kidney stones requiring lithotripsy. CT AP this admission with non-obstructing right renal stone. Marked right renal scarring and atrophy. No hydronephrosis or mass. - PTT prolonged to 43 on admission (INR and PT wnl).   - etiology: not on heparin. not on anticoagulants. possible von willebrand, hemophilia. not concerning for DIC. less likely cirrhosis given INR wnl, thrombycytopenia since yesterday, albumin wnl.   plan  - f/u factor 8.   - liver imaging. f/u heme/onc recs

## 2021-05-05 NOTE — PROGRESS NOTE ADULT - PROBLEM SELECTOR PLAN 3
- hx germinoma (brain, specific location need to obtain records from [facility]) dx in 2000 s/p chemo/radiation in [id number]. dx when he presented with L foot drop/weakness.   - used to see neuro at [facility], lost to follow up for last few years (pt says because of "pandemic")  plan  - MRI brain as above   - heme onc on board - f/u additional recs - hx germinoma (brain, specific location need to obtain records from [facility]) dx in 2000 s/p chemo/radiation in [id number]. dx when he presented with L foot drop/weakness.   - used to see neuro at [facility], lost to follow up for last few years (pt says because of "pandemic")  plan  - MRI brain as above - f/u neuro recs  - heme onc on board - f/u additional recs - 1wk of L leg weakness with worse foot drop and gait difficulty. foot drop present on gait exam. pt had L foot drop since germinoma in 2000 and had high steppage but over last week does not have strength for this and dragging L foot.    - possible cord compression (though foot drop is lower motor neuron) in setting of germinoma with lost to follow up for few years without imaging. no incontinence, weakness of R leg. possible ALS.   plan  - f/u MRI lumbar spine. f/u MRI brain - shows unchanged encephalomalacia and gliosis within the genuine of the corpus callosum. Increased signal intensity along the dorsal medulla without enhancement is nonspecific in appearance. Increasing number of foci of elevated signal intensity within the subcortical and periventricular white matter. Differential diagnosis includes ischemic, infectious/inflammatory and demyelinating processes. Cervical spine MRI with contrast may be valuable for further evaluation. New small inferior left frontal meningioma 3.5 x 8.4 mm. Lumbar spine MRI: Unremarkable.  - neuro was consulted in ED - f/u final recs.

## 2021-05-05 NOTE — PROGRESS NOTE ADULT - SUBJECTIVE AND OBJECTIVE BOX
Chief Complaint:  Patient is a 40y old  Male who presents with a chief complaint of Anemia (02 May 2021 15:36)      Interval Events:   - Pt cleared for EGD/colon per neuro  - Had started prep yesterday, but > 1/2 bottle left today   - Denies abd pain, n/v/d/f/c, bloody BMs (melena, hematochezia)     Allergies:  No Known Allergies      Hospital Medications:  acetaminophen   Tablet .. 650 milliGRAM(s) Oral every 6 hours PRN  lactated ringers. 500 milliLiter(s) IV Continuous <Continuous>  pantoprazole  Injectable 40 milliGRAM(s) IV Push daily  polyethylene glycol 3350 17 Gram(s) Oral daily PRN      PMHX/PSHX:  Germinoma    Hemochromatosis        Family history:      ROS: As per HPI, 14-point ROS negative otherwise.    General:  No wt loss, fevers, chills, night sweats, fatigue,   Eyes:  Good vision, no reported pain  ENT:  No sore throat, pain, runny nose, dysphagia  CV:  No pain, palpitations, hypo/hypertension  Resp:  No dyspnea, cough, tachypnea, wheezing  GI:  See HPI  :  No pain, bleeding, incontinence, nocturia  Muscle:  No pain, weakness  Neuro:  No weakness, tingling, memory problems  Psych:  No fatigue, insomnia, mood problems, depression  Endocrine:  No polyuria, polydipsia, cold/heat intolerance  Heme:  No petechiae, ecchymosis, easy bruisability  Skin:  No rash, edema      PHYSICAL EXAM:   Vital Signs Last 24 Hrs  T(C): 36.3 (05 May 2021 09:03), Max: 36.8 (04 May 2021 13:22)  T(F): 97.4 (05 May 2021 09:03), Max: 98.2 (04 May 2021 13:22)  HR: 70 (05 May 2021 09:03) (59 - 74)  BP: 105/70 (05 May 2021 09:03) (100/66 - 113/77)  BP(mean): --  RR: 18 (05 May 2021 09:03) (18 - 18)  SpO2: 99% (05 May 2021 09:03) (96% - 99%)    GENERAL:  appears comfortable, no acute distress  HEENT:  NC/AT,  conjunctivae clear, sclera -anicteric  CHEST:  no increased effort  HEART:  Regular rate and rhythm  ABDOMEN:  Soft, non-tender, non-distended,  no masses ,no hepato-splenomegaly,   EXTREMITIES:  no cyanosis, clubbing or edema  SKIN:  No rash/erythema/ecchymoses/petechiae/wounds  NEURO:  Alert, oriented    LABS:                        8.6    3.55  )-----------( 119      ( 05 May 2021 07:27 )             30.9     05-05    141  |  104  |  11  ----------------------------<  93  4.0   |  22  |  1.02    Ca    9.7      05 May 2021 07:22  Phos  2.7     05-05  Mg     2.2     05-05    TPro  7.4  /  Alb  5.0  /  TBili  1.4<H>  /  DBili  x   /  AST  20  /  ALT  18  /  AlkPhos  60  05-05    LIVER FUNCTIONS - ( 05 May 2021 07:22 )  Alb: 5.0 g/dL / Pro: 7.4 g/dL / ALK PHOS: 60 U/L / ALT: 18 U/L / AST: 20 U/L / GGT: x           PT/INR - ( 05 May 2021 07:27 )   PT: 13.5 sec;   INR: 1.13 ratio         PTT - ( 05 May 2021 07:27 )  PTT:43.2 sec                    Imaging:

## 2021-05-05 NOTE — PROGRESS NOTE ADULT - SUBJECTIVE AND OBJECTIVE BOX
Interval History: No acute events overnight    MEDICATIONS  (STANDING):  lactated ringers. 500 milliLiter(s) (50 mL/Hr) IV Continuous <Continuous>  pantoprazole  Injectable 40 milliGRAM(s) IV Push daily  polyethylene glycol/electrolyte Solution. 4000 milliLiter(s) Oral once    MEDICATIONS  (PRN):  acetaminophen   Tablet .. 650 milliGRAM(s) Oral every 6 hours PRN Mild Pain (1 - 3)  polyethylene glycol 3350 17 Gram(s) Oral daily PRN Constipation    Allergies  No Known Allergies      ROS: Pertinent positives in HPI, all other ROS were reviewed and are negative.      Vital Signs Last 24 Hrs  T(C): 36.3 (05 May 2021 09:03), Max: 36.8 (04 May 2021 13:22)  T(F): 97.4 (05 May 2021 09:03), Max: 98.2 (04 May 2021 13:22)  HR: 70 (05 May 2021 09:03) (59 - 74)  BP: 105/70 (05 May 2021 09:03) (100/66 - 113/77)  RR: 18 (05 May 2021 09:03) (18 - 18)  SpO2: 99% (05 May 2021 09:03) (96% - 99%)    NEUROLOGICAL EXAM:  MS: AAOX3, normal attention and language  CN: no facial asymmetry  Motor: Strength: 5/5 upper extremities and RLE. LLE: H-flex:4, H-abd:4+, H-add:4+, D-flex:4+, otherwise 5/5.   Coordination: FTN intact, TODD slower on left side  Gait:  Romberg negative, walks with walker, postural instability with L foot drag      Labs:   cbc                      8.6    3.55  )-----------( 119      ( 05 May 2021 07:27 )             30.9     [id number]    141  |  104  |  11  ----------------------------<  93  4.0   |  22  |  1.02    Ca    9.7      05 May 2021 07:22  Phos  2.7     05-05  Mg     2.2     05-05    TPro  7.4  /  Alb  5.0  /  TBili  1.4<H>  /  DBili  x   /  AST  20  /  ALT  18  /  AlkPhos  60  05-05    CoagsPT/INR - ( 05 May 2021 07:27 )   PT: 13.5 sec;   INR: 1.13 ratio         PTT - ( 05 May 2021 07:27 )  PTT:43.2 sec  Lipids  A1C  CardiacMarkersCARDIAC MARKERS ( 05 May 2021 07:22 )  x     / x     / 41 U/L / x     / x          LFTsLIVER FUNCTIONS - ( 05 May 2021 07:22 )  Alb: 5.0 g/dL / Pro: 7.4 g/dL / ALK PHOS: 60 U/L / ALT: 18 U/L / AST: 20 U/L / GGT: x           UA Interval History: No acute events overnight    MEDICATIONS  (STANDING):  lactated ringers. 500 milliLiter(s) (50 mL/Hr) IV Continuous <Continuous>  pantoprazole  Injectable 40 milliGRAM(s) IV Push daily  polyethylene glycol/electrolyte Solution. 4000 milliLiter(s) Oral once    MEDICATIONS  (PRN):  acetaminophen   Tablet .. 650 milliGRAM(s) Oral every 6 hours PRN Mild Pain (1 - 3)  polyethylene glycol 3350 17 Gram(s) Oral daily PRN Constipation    Allergies  No Known Allergies      ROS: Pertinent positives in HPI, all other ROS were reviewed and are negative.      Vital Signs Last 24 Hrs  T(C): 36.3 (05 May 2021 09:03), Max: 36.8 (04 May 2021 13:22)  T(F): 97.4 (05 May 2021 09:03), Max: 98.2 (04 May 2021 13:22)  HR: 70 (05 May 2021 09:03) (59 - 74)  BP: 105/70 (05 May 2021 09:03) (100/66 - 113/77)  RR: 18 (05 May 2021 09:03) (18 - 18)  SpO2: 99% (05 May 2021 09:03) (96% - 99%)    NEUROLOGICAL EXAM:  MS: AAOX3, normal attention and language  CN: no facial asymmetry  Motor: Strength: 5/5 upper extremities and RLE. LLE: H-flex:4, H-abd:4+, H-add:4+, D-flex:4+, otherwise 5/5.   Coordination: FTN intact, TODD slower on left side  Gait:  Romberg negative, walks with walker, postural instability with L foot drag      Labs:   cbc                      8.6    3.55  )-----------( 119      ( 05 May 2021 07:27 )             30.9     [id number]    141  |  104  |  11  ----------------------------<  93  4.0   |  22  |  1.02    Ca    9.7      05 May 2021 07:22  Phos  2.7     05-05  Mg     2.2     05-05    TPro  7.4  /  Alb  5.0  /  TBili  1.4<H>  /  DBili  x   /  AST  20  /  ALT  18  /  AlkPhos  60  05-05    CoagsPT/INR - ( 05 May 2021 07:27 )   PT: 13.5 sec;   INR: 1.13 ratio         PTT - ( 05 May 2021 07:27 )  PTT:43.2 sec  Lipids  A1C  CardiacMarkersCARDIAC MARKERS ( 05 May 2021 07:22 )  x     / x     / 41 U/L / x     / x          LFTsLIVER FUNCTIONS - ( 05 May 2021 07:22 )  Alb: 5.0 g/dL / Pro: 7.4 g/dL / ALK PHOS: 60 U/L / ALT: 18 U/L / AST: 20 U/L / GGT: x           UA    Imaging  MR Lumbar Spine No Cont (05.03.21 @ 23:53)   IMPRESSION:    Brain MRI: Unchanged encephalomalacia and gliosis within the genuine of the corpus callosum.    Increased signal intensity along the dorsal medulla without enhancement is nonspecific in appearance. Increasing number of foci of elevated signal intensity within the subcortical and periventricular white matter. Differential diagnosis includes ischemic, infectious/inflammatory and demyelinating processes. Cervical spine MRI with contrast may be valuable for further evaluation.    New small inferior left frontal meningioma.    Lumbar spine MRI: Unremarkable.       Interval History: No acute events overnight      MEDICATIONS  (STANDING):  lactated ringers. 500 milliLiter(s) (50 mL/Hr) IV Continuous <Continuous>  pantoprazole  Injectable 40 milliGRAM(s) IV Push daily  polyethylene glycol/electrolyte Solution. 4000 milliLiter(s) Oral once    MEDICATIONS  (PRN):  acetaminophen   Tablet .. 650 milliGRAM(s) Oral every 6 hours PRN Mild Pain (1 - 3)  polyethylene glycol 3350 17 Gram(s) Oral daily PRN Constipation    Allergies  No Known Allergies      ROS: Pertinent positives in HPI, all other ROS were reviewed and are negative.      Vital Signs Last 24 Hrs  T(C): 36.3 (05 May 2021 09:03), Max: 36.8 (04 May 2021 13:22)  T(F): 97.4 (05 May 2021 09:03), Max: 98.2 (04 May 2021 13:22)  HR: 70 (05 May 2021 09:03) (59 - 74)  BP: 105/70 (05 May 2021 09:03) (100/66 - 113/77)  RR: 18 (05 May 2021 09:03) (18 - 18)  SpO2: 99% (05 May 2021 09:03) (96% - 99%)    GEn: NAD  SKin: no rashes  no joint swelling  NEUROLOGICAL EXAM:  MS: AAOX3, normal attention and language  CN: no facial asymmetry  Motor: Strength: 5/5 upper extremities and RLE. LLE: H-flex:4, H-abd:4+, H-add:4+, D-flex:4+, otherwise 5/5.   Coordination: FTN intact, TODD slower on left side  Gait:  Romberg negative, walks with walker, postural instability with L foot drag      Labs:   cbc                      8.6    3.55  )-----------( 119      ( 05 May 2021 07:27 )             30.9     [id number]    141  |  104  |  11  ----------------------------<  93  4.0   |  22  |  1.02    Ca    9.7      05 May 2021 07:22  Phos  2.7     05-05  Mg     2.2     05-05    TPro  7.4  /  Alb  5.0  /  TBili  1.4<H>  /  DBili  x   /  AST  20  /  ALT  18  /  AlkPhos  60  05-05    CoagsPT/INR - ( 05 May 2021 07:27 )   PT: 13.5 sec;   INR: 1.13 ratio         PTT - ( 05 May 2021 07:27 )  PTT:43.2 sec  Lipids  A1C  CardiacMarkersCARDIAC MARKERS ( 05 May 2021 07:22 )  x     / x     / 41 U/L / x     / x          LFTsLIVER FUNCTIONS - ( 05 May 2021 07:22 )  Alb: 5.0 g/dL / Pro: 7.4 g/dL / ALK PHOS: 60 U/L / ALT: 18 U/L / AST: 20 U/L / GGT: x           UA    Imaging  MR Lumbar Spine No Cont (05.03.21 @ 23:53)   IMPRESSION:    Brain MRI: Unchanged encephalomalacia and gliosis within the genuine of the corpus callosum.    Increased signal intensity along the dorsal medulla without enhancement is nonspecific in appearance. Increasing number of foci of elevated signal intensity within the subcortical and periventricular white matter. Differential diagnosis includes ischemic, infectious/inflammatory and demyelinating processes. Cervical spine MRI with contrast may be valuable for further evaluation.    New small inferior left frontal meningioma.    Lumbar spine MRI: Unremarkable.

## 2021-05-05 NOTE — PROGRESS NOTE ADULT - ATTENDING COMMENTS
-GI planned for EGD/colonoscopy tomorrow. 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   -F/u heme recs regarding repeating iron infusion. -?consider of ?BM bx.   -F/u with neuro regarding consideration of LP to aide in diagnosis. -GI planned for EGD/colonoscopy tomorrow. 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   -F/u heme recs regarding repeating iron infusion. -?consider of ?BM bx.   -Splenomegaly and ?hypersplenism contributing to thrombocytopenia.   -F/u with neuro regarding consideration of LP to aide in diagnosis.

## 2021-05-05 NOTE — PROGRESS NOTE ADULT - ATTENDING COMMENTS
Pt with left hip flexion weakness, mild adduction and abduction weakness and 4+/5 DF weakness on the left. There is also some sensory or ataxic tremor of the left arm and leg. MRI shows nonspecific T2 changes in the brainstem. Unclear if these are new or chronic. Pending imaging to be brought in by pt's sister.

## 2021-05-05 NOTE — DISCHARGE NOTE PROVIDER - NSDCFUADDAPPT_GEN_ALL_CORE_FT
To do on discharge  [ ] follow up with heme/onc within 1 week of discharge  [ ] follow up with neurology within 1 week of discharge   [ ] follow up with PCP within 1 week of discharge  [ ] follow up CSF cytometry and cytopathology, hemachromatosis genetic testing studies  [ ] 2.0 cm right thyroid nodule on imaging inpt. Recommend further evaluation by ultrasound. To do on discharge:  [ ] follow up with heme/onc within 2 weeks of discharge. Tell your hematologist to consider a genetic hemochromatosis test.  [ ] follow up with neurology/MS specialist within 2 weeks of discharge. Follow up remaining CSF studies.  [ ] follow up with PCP within 2 weeks of discharge. Tell your PCP Dr. Ponce to review your hospitalization and to consider an outpatient capsule study. Tell your PCP to further evaluate your 2cm right thyroid nodule found on inpatient imaging with a outpatient ultrasound

## 2021-05-05 NOTE — MEDICAL STUDENT PROGRESS NOTE(EDUCATION) - NS MD HP STUD ASPLAN PLAN FT
1. Iron deficiency anemia (MCV 50, ferritin 2) with inappropriate bone marrow response (retic 0.7%): consider 2/2 myelodysplastic syndrome (2/2 chemoradiation hx) +/- acquired alpha thalassemia/ hgb H (occurs in <10% of MDS) vs. blood loss (ie GI malignancy) vs. 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 New L leg weakness: consider new CNS malignancy vs. ALS vs. MS vs. residual deficit (from past germinoma) w/ worsening fatigue (from current anemia); MRI head 5/3: Increased signal intensity along the dorsal medulla without enhancement is nonspecific in appearance. Increasing number of foci of elevated signal intensity within the subcortical and periventricular white matter. Differential diagnosis includes ischemic, infectious/inflammatory and demyelinating processes; MRI L-spine unremarkable; AFP, hCG wnl  - f/u MRI cervical, thoracic  - OT/PT following  - f/u neuro recs    3. 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 Hx of hemochromatosis w/ phlebotomy management  - monitor ferritin w/ blood transfusions  - recs as per heme/onc    6. Leukopenia: consider 2/2 MDS/ bone malignancy; infection appears less likely given afebrile, vitals stable, no source  - CBC daily    7. Thrombocytopenia: consider 2/2 to new PPI vs. bone malignancy   - PPI downtitrated from BID to qd 5/3  - CBC daily    8. Tongue fasciculation consider 2/2 to prior brain germinoma vs new malignancy vs SMA vs. benign finding  - f/u neuro recs  - f/u brain mri    9. Nystagmus: old finding per patient, perhaps 2/2 to prior germinoma  - f/u neuro recs  - f/u brain mri    10. DVT: SCD (given possible GI bleed, profound anemia)    11. FENGI: regular diet; can d/c LR   1. Iron deficiency anemia (MCV 50, ferritin 2) with inappropriate bone marrow response (retic 0.7%): consider 2/2 myelodysplastic syndrome (2/2 chemoradiation hx) +/- acquired alpha thalassemia/ hgb H (occurs in <10% of MDS) vs. blood loss (ie GI malignancy) vs. 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 New L leg weakness: consider new CNS malignancy vs. ALS vs. MS vs. residual deficit (from past germinoma) w/ worsening fatigue (from current anemia); MRI head 5/3: Increased signal intensity along the dorsal medulla without enhancement is nonspecific in appearance. Increasing number of foci of elevated signal intensity within the subcortical and periventricular white matter. Differential diagnosis includes ischemic, infectious/inflammatory and demyelinating processes; MRI L-spine unremarkable; AFP, hCG wnl  - f/u MRI cervical, thoracic  - OT/PT following  - f/u neuro recs  - consider LP given concern for malignancy, ALS    3. 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 Hx of hemochromatosis w/ phlebotomy management  - monitor ferritin w/ blood transfusions  - recs as per heme/onc    6. Leukopenia: consider 2/2 MDS/ bone malignancy; infection appears less likely given afebrile, vitals stable, no source  - CBC daily    7. Thrombocytopenia: consider 2/2 to new PPI vs. bone malignancy   - PPI downtitrated from BID to qd 5/3  - CBC daily    8. Tongue fasciculation consider 2/2 to prior brain germinoma vs new malignancy vs SMA vs. benign finding  - f/u neuro recs  - f/u brain mri    9. Nystagmus: old finding per patient, perhaps 2/2 to prior germinoma  - f/u neuro recs  - f/u brain mri    10. DVT: SCD (given possible GI bleed, profound anemia)    11. FENGI: regular diet; can d/c LR

## 2021-05-05 NOTE — PROGRESS NOTE ADULT - ASSESSMENT
40M w/ hx of germinoma s/p chemo and XRT, prior hx of hemochromatosis requiring phlebotomy (none for last 3 years), FHx of thalassemia in mother, presenting w/ food drop, found to have severe iron deficiency anemia w/ Hb 6, ferritin of 2, MCV 50s.    Impression:  #Severe microcytic anemia w/ severe iron deficiency - hgb stable. No GI bleeding. Likely hematologically driven from thalassemia but cannot r/o additional component of GI losses from gastric or colorectal cancers, though pt has no signs on history pointing towards this.  #Germinoma brain s/p chemo and XRT  #Hx of hemochromatosis requiring phlebotomy  # Abnormal MR imaging    Recommendations:  - Cleared by neuro for EGD/colon   - Plan for EGD/colon tomorrow  - Prep: 4L golytely today + dulcolax   - Trend CBC, transfuse Hb < 7   - Active T&S          Thank you for involving us in the care of this patient, please reach out if any further questions.     Angel Wright MD  Gastroenterology Fellow, PGY4    Available on [employer] Teams  [phone] ([facility])  [id number] ([facility])  Please contact on call fellow weekdays after 5pm-7am and weekends: [phone]

## 2021-05-05 NOTE — DISCHARGE NOTE PROVIDER - NSDCMRMEDTOKEN_GEN_ALL_CORE_FT
rolling walker: ICD: M21.37   Drisdol 50,000 intl units (1.25 mg) oral capsule: 1 cap(s) orally once a week on Saturday  ferrous sulfate 325 mg (65 mg elemental iron) oral tablet: 1 tab(s) orally 2 times a day  rolling walker: ICD: M21.37   Drisdol 50,000 intl units (1.25 mg) oral capsule: 1 cap(s) orally once a week on Saturday  ferrous sulfate 325 mg (65 mg elemental iron) oral tablet: 1 tab(s) orally 2 times a day  Outpatient Occupation therapy  ICD-10-CM Diagnosis Code G37.9 :   Outpatient physical therapy ICD-10-CM Diagnosis Code G37.9 :   rolling walker: ICD: M21.37

## 2021-05-05 NOTE — PROGRESS NOTE ADULT - PROBLEM SELECTOR PLAN 8
- fasciulation of tongue observed on exam  - etiology: benign, ALS - horizontal nystagmus with lateral eye movements - pt states has had before, is not sure cause.   - etiology: cerebellum lesion though no dysmetria and no dyskinesia.    #tongue fasciculations  - fasciulation of tongue observed on exam  - etiology: benign, ALS

## 2021-05-05 NOTE — DISCHARGE NOTE PROVIDER - HOSPITAL COURSE
Mr. Duran is a 39 y/o man with hx germinoma (brain) s/p chemo and radiation 2000, hx hemochromatosis requiring phlebotomies, has not followed up with doctors in 3 years presenting for L leg weakness/worsening foot drop found to be anemia (asymptomatic) with hb 6.1 s/p 2U pRBC with stable hb since.   ANEMIA:   - Microcytic anemia with MCV 50.7, RDW 22.9, smear with moderate hypochromia, target cells. 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 Also found to have low factor 8 and 9: factor 8 lvl 23, factor 9 lvl 73. von Willebrand factor activity wnl, prolonged PTT - no hx bleeding per pt. unclear etiology, unlikely to explain anemia. Genetic testing for hemochromatosis sent inpt (still running). 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 HIV negative. Celiac lab negative (anti transglutaminase neg, IgM elevated). parvovirus neg. retic low in setting of anemia suggesting bone marrow suppression (MDS). lead/heavy metal neg. CT AP with splenomegaly, atrophic R kidney (hx nephrolithiasis with kidney stone) GI consulted for possible GI bleed leading to JAS. Endoscopy and colonoscopy showed no obvious sources of bleeding, diverticulosis in ascending colon, multiple fundic gland polyps, duodenum biopsied (normal in appearance) - f/u path. can obtain capsule study outpt if JAS persists, repeat colonoscopy at age 44 for screening. Heme onc was following - recommended no bone marrow biopsy inpt - follow up outpt.      WEAKNESS/FOOT DROP:   - MRI lumbar spine with chronic demyelination.   - MRI brain - shows unchanged encephalomalacia and gliosis within the genuine of the corpus callosum. Increased signal intensity along the dorsal medulla without enhancement is nonspecific in appearance. Increasing number of foci of elevated signal intensity within the subcortical and periventricular white matter. Differential diagnosis includes ischemic, infectious/inflammatory and demyelinating processes. Cervical spine MRI with contrast may be valuable for further evaluation. New small inferior left frontal meningioma 3.5 x 8.4 mm. Lumbar spine MRI: Unremarkable.  - MRI cervical/thoracic spine - Enhancing left cord lesion at C4-C5 most compatible with acute demyelination. Less likely consideration is metastasis in this patient with history of germinoma. Multiple additional nonenhancing lesions throughout the spinal cord, most compatible with chronic demyelination. Correlate with symptomatology and CSF analysis. 2.0 cm right thyroid nodule. Recommend further evaluation by ultrasound.  - neuro was consulted, started on prednisolone 1g IV for 5 days (5/6-5/10). LP 5/7 gram stain and culture negative. Concern for MS. Need to follow up cytology and cytopathology.       To do on discharge  [ ] follow up with heme/onc within 1 week of discharge  [ ] follow up with neurology within 1 week of discharge   [ ] follow up with PCP within 1 week of discharge  [ ] follow up CSF cytometry and cytopathology, hemachromatosis genetic testing studies Mr. Duran is a 41 y/o man with hx germinoma (brain) s/p chemo and radiation 2000, hx hemochromatosis requiring phlebotomies, has not followed up with doctors in 3 years presenting for L leg weakness/worsening foot drop found to be anemia (asymptomatic) with hb 6.1 s/p 2U pRBC with stable hb since.   ANEMIA:   - Microcytic anemia with MCV 50.7, RDW 22.9, smear with moderate hypochromia, target cells. 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 Also found to have low factor 8 and 9: factor 8 lvl 23, factor 9 lvl 73. von Willebrand factor activity wnl, prolonged PTT - no hx bleeding per pt. unclear etiology, unlikely to explain anemia. Genetic testing for hemochromatosis sent inpt (still running). 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 HIV negative. Celiac lab negative (anti transglutaminase neg, IgM elevated). parvovirus neg. retic low in setting of anemia suggesting bone marrow suppression (MDS). lead/heavy metal neg. CT AP with splenomegaly, atrophic R kidney (hx nephrolithiasis with kidney stone) GI consulted for possible GI bleed leading to JAS. Endoscopy and colonoscopy showed no obvious sources of bleeding, diverticulosis in ascending colon, multiple fundic gland polyps, duodenum biopsied (normal in appearance) - f/u path. can obtain capsule study outpt if JAS persists, repeat colonoscopy at age 44 for screening. Heme onc was following - recommended no bone marrow biopsy inpt - follow up outpt.      WEAKNESS/FOOT DROP:   - MRI brain - shows unchanged encephalomalacia and gliosis within the genuine of the corpus callosum. Increased signal intensity along the dorsal medulla without enhancement is nonspecific in appearance. Increasing number of foci of elevated signal intensity within the subcortical and periventricular white matter. Differential diagnosis includes ischemic, infectious/inflammatory and demyelinating processes. Cervical spine MRI with contrast may be valuable for further evaluation. New small inferior left frontal meningioma 3.5 x 8.4 mm. Lumbar spine MRI: Unremarkable.  - MRI cervical/thoracic spine - Enhancing left cord lesion at C4-C5 most compatible with acute demyelination. Less likely consideration is metastasis in this patient with history of germinoma. Multiple additional nonenhancing lesions throughout the spinal cord, most compatible with chronic demyelination. Correlate with symptomatology and CSF analysis. 2.0 cm right thyroid nodule. Recommend further evaluation by ultrasound.  - MRI cervical spine with contrast: Evidence of T2 hyperintensity in the right aspect of the cord at T11-12 as well as more inferiorly at T12-L1 level extending to the tip of the conus without associated enhancement or expansion of the cord likely chronic demyelinating pathology.  - neuro was consulted, started on prednisolone 1g IV for 5 days (5/6-5/10). LP 5/7 gram stain and culture negative. Concern for MS. Need to follow up cytology and cytopathology.     Pt has improved gait and resolved nystagmus on steroid course. Stable for discharge to home with home PT and rolling walker.       To do on discharge  [ ] follow up with heme/onc within 1 week of discharge  [ ] follow up with neurology within 1 week of discharge   [ ] follow up with PCP within 1 week of discharge  [ ] follow up CSF cytometry and cytopathology, hemachromatosis genetic testing studies Mr. Duran is a 39 y/o man with hx germinoma (brain) s/p chemo and radiation 2000, hx hemochromatosis requiring phlebotomies, has not followed up with doctors in 3 years presenting for L leg weakness/worsening foot drop found to be anemia (asymptomatic) with hb 6.1 s/p 2U pRBC with stable hb since.   ANEMIA:   - Microcytic anemia with MCV 50.7, RDW 22.9, smear with moderate hypochromia, target cells. 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 Also found to have low factor 8 and 9: factor 8 lvl 23, factor 9 lvl 73. von Willebrand factor activity wnl, prolonged PTT - no hx bleeding per pt. unclear etiology, unlikely to explain anemia. Genetic testing for hemochromatosis sent inpt (still running). 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 HIV negative. Celiac lab negative (anti transglutaminase neg, IgM elevated). parvovirus neg. retic low in setting of anemia suggesting bone marrow suppression (MDS). lead/heavy metal neg. CT AP with splenomegaly, atrophic R kidney (hx nephrolithiasis with kidney stone) GI consulted for possible GI bleed leading to JAS. Endoscopy and colonoscopy showed no obvious sources of bleeding, diverticulosis in ascending colon, multiple fundic gland polyps, duodenum biopsied (normal in appearance) - f/u path. can obtain capsule study outpt if JAS persists, repeat colonoscopy at age 44 for screening. Heme onc was following - recommended no bone marrow biopsy inpt - follow up outpt.      WEAKNESS/FOOT DROP:   - MRI brain - shows unchanged encephalomalacia and gliosis within the genuine of the corpus callosum. Increased signal intensity along the dorsal medulla without enhancement is nonspecific in appearance. Increasing number of foci of elevated signal intensity within the subcortical and periventricular white matter. Differential diagnosis includes ischemic, infectious/inflammatory and demyelinating processes. Cervical spine MRI with contrast may be valuable for further evaluation. New small inferior left frontal meningioma 3.5 x 8.4 mm. Lumbar spine MRI: Unremarkable.  - MRI cervical/thoracic spine - Enhancing left cord lesion at C4-C5 most compatible with acute demyelination. Less likely consideration is metastasis in this patient with history of germinoma. Multiple additional nonenhancing lesions throughout the spinal cord, most compatible with chronic demyelination. Correlate with symptomatology and CSF analysis. 2.0 cm right thyroid nodule. Recommend further evaluation by ultrasound.  - MRI cervical spine with contrast: Evidence of T2 hyperintensity in the right aspect of the cord at T11-12 as well as more inferiorly at T12-L1 level extending to the tip of the conus without associated enhancement or expansion of the cord likely chronic demyelinating pathology.  - neuro was consulted, started on prednisolone 1g IV for 5 days (5/6-5/10). LP 5/7 gram stain and culture negative. Concern for MS. Need to follow up cytology and cytopathology.     Pt has improved gait and resolved nystagmus on steroid course. Stable for discharge to home with home PT and rolling walker.       To do on discharge  [ ] follow up with heme/onc within 1 week of discharge  [ ] follow up with neurology within 1 week of discharge   [ ] follow up with PCP within 1 week of discharge  [ ] follow up CSF cytometry and cytopathology, hemachromatosis genetic testing studies  [ ] 2.0 cm right thyroid nodule on imaging inpt. Recommend further evaluation by ultrasound. 39 y/o man with hx germinoma (brain) s/p chemo and radiation 2000, hx hemochromatosis requiring phlebotomies, has not followed up with doctors in 3 years presented for L leg weakness/worsening foot drop worsening over past week, found to be anemic to 6.1, microcytic with MCV of 50s and elevated RDW. With mild pancytopenia.     For the LLE weakness and foot drop, imaging was performed. MRI brain: showed unchanged encephalomalacia and gliosis within the genuine of the corpus callosum. Increased signal intensity along the dorsal medulla without enhancement is nonspecific in appearance. Increasing number of foci of elevated signal intensity within the subcortical and periventricular white matter. Differential diagnosis includes ischemic, infectious/inflammatory and demyelinating processes. Cervical spine MRI with contrast may be valuable for further evaluation. New small inferior left frontal meningioma 3.5 x 8.4 mm. Lumbar spine MRI: Unremarkable. MRI cervical/thoracic spine: Enhancing left cord lesion at C4-C5 most compatible with acute demyelination. Less likely consideration is metastasis in this patient with history of germinoma. Multiple additional nonenhancing lesions throughout the spinal cord, most compatible with chronic demyelination. Correlate with symptomatology and CSF analysis. 2.0 cm right thyroid nodule. Recommend further evaluation by ultrasound. MRI cervical spine with contrast: Evidence of T2 hyperintensity in the right aspect of the cord at T11-12 as well as more inferiorly at T12-L1 level extending to the tip of the conus without associated enhancement or expansion of the cord likely chronic demyelinating pathology. Neuro was consulted, suspicion was raised for new MS. Pt was started on solumedrol 1g IV for 5 days (5/6-5/10). LP was performed 5/7. Gram stain and culture negative, CSF PCR negative. Cytology and cytopathology were sent. On day 3 of steroids pt noted improvement of gait and LLE weakness.     For the anemia, heme was consulted. Given low MCV thalassemia and pt's positive family history of beta thal, hemoglobin electrophoresis was sent. Results were consistent with beta thalassemia minor. Was noted to have elevated pTT. Factor VIII and XI were sent, which revealed low factor VIII and IX levels: factor 8 lvl 23, factor 9 lvl 73. Von Willebrand factor activity was wnl.   Pt was noted to have ferritin level of 2, was also found to have FOBT positive. Concern for severe JAS, with possible element of GI blood loss. Was started on Venofer. GI was consulted. Performed endoscopy with colonoscopy which noted diverticulosis in ascending colon, multiple fundic gland polyps, duodenum biopsied (normal in appearance). Overall was unrevealing for any sources of bleeding. GI recommended capsule study outpatient.     Interestingly pt endorsed a hx of hemochromatosis. Was unaware of previous genetic testing, yet allegedly had previous MRI which showed iron deposition. Pt reported being given 1 blood transfusion at age 4, so heme was less suspicious for acquired hemochromatosis. Genetic testing for HFE gene was sent (still pending).     For pancytopenia, workup was sent. HIV negative. Celiac lab negative (anti transglutaminase neg), parvovirus DNR negative. Reticulocyte count was low in setting of anemia suggesting bone marrow suppression, suspicion was raised for myelodysplastic syndrome. CT AP with splenomegaly, atrophic R kidney (hx nephrolithiasis with kidney stone). Heme recommended outpatient bone marrow biopsy.     Stable for discharge to home with home PT and rolling walker.    39 y/o man with hx germinoma (brain) s/p chemo and radiation 2000, hx hemochromatosis requiring phlebotomies, has not followed up with doctors in 3 years presented for L leg weakness/worsening foot drop worsening over past week, found to be anemic to 6.1, microcytic with MCV of 50s and elevated RDW. With mild pancytopenia.     For the LLE weakness and foot drop, imaging was performed. MRI brain: showed unchanged encephalomalacia and gliosis within the genuine of the corpus callosum. Increased signal intensity along the dorsal medulla without enhancement is nonspecific in appearance. Increasing number of foci of elevated signal intensity within the subcortical and periventricular white matter. Differential diagnosis includes ischemic, infectious/inflammatory and demyelinating processes. Cervical spine MRI with contrast may be valuable for further evaluation. New small inferior left frontal meningioma 3.5 x 8.4 mm. Lumbar spine MRI: Unremarkable. MRI cervical/thoracic spine: Enhancing left cord lesion at C4-C5 most compatible with acute demyelination. Less likely consideration is metastasis in this patient with history of germinoma. Multiple additional nonenhancing lesions throughout the spinal cord, most compatible with chronic demyelination. Correlate with symptomatology and CSF analysis. 2.0 cm right thyroid nodule. Recommend further evaluation by ultrasound. MRI cervical spine with contrast: Evidence of T2 hyperintensity in the right aspect of the cord at T11-12 as well as more inferiorly at T12-L1 level extending to the tip of the conus without associated enhancement or expansion of the cord likely chronic demyelinating pathology. Neuro was consulted, suspicion was raised for new MS. Pt was started on solumedrol 1g IV for 5 days (5/6-5/10). LP was performed 5/7. Gram stain and culture negative, CSF PCR negative. Cytology and cytopathology were sent. On day 3 of steroids pt noted improvement of gait and LLE weakness.     For the anemia, heme was consulted. Given low MCV thalassemia and pt's positive family history of beta thal, hemoglobin electrophoresis was sent. Results were consistent with beta thalassemia minor. Was noted to have elevated pTT. Factor VIII and XI were sent, which revealed low factor VIII and IX levels: factor 8 lvl 23, factor 9 lvl 73. Von Willebrand factor activity was wnl.   Pt was noted to have ferritin level of 2, was also found to have FOBT positive. Concern for severe JAS, with possible element of GI blood loss. Was started on Venofer. GI was consulted. Performed endoscopy with colonoscopy which noted diverticulosis in ascending colon, multiple fundic gland polyps, duodenum biopsied (normal in appearance). Overall was unrevealing for any sources of bleeding. GI recommended capsule study outpatient.     Interestingly pt endorsed a hx of hemochromatosis. Was unaware of previous genetic testing, yet allegedly had previous MRI which showed iron deposition. Pt reported being given 1 blood transfusion at age 4, so heme was less suspicious for acquired hemochromatosis. Genetic testing for HFE gene was sent (still pending).     For pancytopenia, workup was sent. HIV negative. Celiac lab negative (anti transglutaminase neg), parvovirus DNR negative. Reticulocyte count was low in setting of anemia suggesting bone marrow suppression, suspicion was raised for myelodysplastic syndrome. CT AP with splenomegaly, atrophic R kidney (hx nephrolithiasis with kidney stone). Heme recommended outpatient bone marrow biopsy.     Stable for discharge to home with outpatient PT, OT and rolling walker.    39 y/o man with hx germinoma (brain) s/p chemo and radiation 2000, hx hemochromatosis requiring phlebotomies, has not followed up with doctors in 3 years presented for L leg weakness/worsening foot drop worsening over past week, found to be anemic to 6.1, microcytic with MCV of 50s and elevated RDW. With mild pancytopenia.     For the LLE weakness and foot drop, imaging was performed. MRI brain: showed unchanged encephalomalacia and gliosis within the genuine of the corpus callosum. Increased signal intensity along the dorsal medulla without enhancement is nonspecific in appearance. Increasing number of foci of elevated signal intensity within the subcortical and periventricular white matter. Differential diagnosis includes ischemic, infectious/inflammatory and demyelinating processes. Cervical spine MRI with contrast may be valuable for further evaluation. New small inferior left frontal meningioma 3.5 x 8.4 mm. Lumbar spine MRI: Unremarkable. MRI cervical/thoracic spine: Enhancing left cord lesion at C4-C5 most compatible with acute demyelination. Less likely consideration is metastasis in this patient with history of germinoma. Multiple additional nonenhancing lesions throughout the spinal cord, most compatible with chronic demyelination. Correlate with symptomatology and CSF analysis. 2.0 cm right thyroid nodule. Recommend further evaluation by ultrasound. MRI cervical spine with contrast: Evidence of T2 hyperintensity in the right aspect of the cord at T11-12 as well as more inferiorly at T12-L1 level extending to the tip of the conus without associated enhancement or expansion of the cord likely chronic demyelinating pathology. Neuro was consulted, suspicion was raised for new MS. Pt was started on solumedrol 1g IV for 5 days (5/6-5/10). LP was performed 5/7. Gram stain and culture negative, CSF PCR negative. Cytology and cytopathology were sent. On day 3 of steroids pt noted improvement of gait and LLE weakness.     For the anemia, heme was consulted. Given low MCV thalassemia and pt's positive family history of beta thal, hemoglobin electrophoresis was sent. Results were consistent with beta thalassemia minor. Was noted to have elevated pTT. Factor VIII and XI were sent, which revealed low factor VIII and IX levels: factor 8 lvl 23, factor 9 lvl 73. Von Willebrand factor activity was wnl.   Pt was noted to have ferritin level of 2, was also found to have FOBT positive. Concern for severe JAS, with possible element of GI blood loss. Was started on Venofer. GI was consulted. Performed endoscopy with colonoscopy which noted diverticulosis in ascending colon, multiple fundic gland polyps, duodenum biopsied (normal in appearance). Overall was unrevealing for any sources of bleeding. GI recommended capsule study outpatient.     Interestingly pt endorsed a hx of hemochromatosis. Was unaware of previous genetic testing, yet allegedly had previous MRI which showed iron deposition. Pt reported being given 1 blood transfusion at age 4, so heme was less suspicious for acquired hemochromatosis. Patient will have outpatient hematology follow-up during which Genetic testing for HFE gene can be considered.     For pancytopenia, workup was sent. HIV negative. Celiac lab negative (anti transglutaminase neg), parvovirus DNR negative. Reticulocyte count was low in setting of anemia suggesting bone marrow suppression, suspicion was raised for myelodysplastic syndrome. CT AP with splenomegaly, atrophic R kidney (hx nephrolithiasis with kidney stone). Heme recommended outpatient bone marrow biopsy.     Stable for discharge to home with outpatient PT, OT and rolling walker.

## 2021-05-05 NOTE — DISCHARGE NOTE PROVIDER - CARE PROVIDER_API CALL
Reno Valentin  HEMATOLOGY  [address], [address]  [place] [address]  Phone: [phone]  Fax: [phone]  Established Patient  Follow Up Time: 2 weeks    SADE RAMIREZ  [id number]  [address] [place] [address]  Phone: [phone]  Fax: [phone]  Established Patient  Follow Up Time: 2 weeks    Wendy Galo)  Neurology  [address] [place] [id number]  Phone: [phone]  Fax: [phone]  Follow Up Time: 2 weeks

## 2021-05-05 NOTE — PROGRESS NOTE ADULT - PROBLEM SELECTOR PLAN 10
DVT ppx - SCD pending heme studies  diet - regular

## 2021-05-05 NOTE — PROGRESS NOTE ADULT - PROBLEM SELECTOR PLAN 2
- 1wk of L leg weakness with worse foot drop and gait difficulty. foot drop present on gait exam. pt had L foot drop since germinoma in 2000 and had high steppage but over last week does not have strength for this and dragging L foot.    - possible cord compression (though foot drop is lower motor neuron) in setting of germinoma with lost to follow up for few years without imaging. no incontinence, weakness of R leg. possible ALS.   plan  - f/u MRI lumbar spine. f/u MRI brain - shows unchanged encephalomalacia and gliosis within the genuine of the corpus callosum. Increased signal intensity along the dorsal medulla without enhancement is nonspecific in appearance. Increasing number of foci of elevated signal intensity within the subcortical and periventricular white matter. Differential diagnosis includes ischemic, infectious/inflammatory and demyelinating processes. Cervical spine MRI with contrast may be valuable for further evaluation. New small inferior left frontal meningioma 3.5 x 8.4 mm. Lumbar spine MRI: Unremarkable.  - neuro was consulted in ED - f/u final recs. - hb electropheresis w beta thalassemia minor (hb A 95.1, hb A2 elevated 4.9)  - clotting factor lvls - factor 8 23, factor 9 73. von Willebrand factor activity wnl - hb electropheresis w beta thalassemia minor (hb A 95.1, hb A2 elevated 4.9)  - clotting factor lvls - factor 8 23, factor 9 73. von Willebrand factor activity wnl  - f/u heme onc recs - hb electropheresis w beta thalassemia minor (hb A 95.1, hb A2 elevated 4.9) - unlikely to explain anemia.   - clotting factor lvls - factor 8 23, factor 9 73. von Willebrand factor activity wnl - no hx bleeding per pt. unclear etiology, unlikely to explain anemia  - f/u heme onc recs

## 2021-05-05 NOTE — PROGRESS NOTE ADULT - PROBLEM SELECTOR PLAN 4
- hx hemochromatosis requiring monthly phlebotomies (confirmed with PCP Dr. Ponce) - last phlebotomy 3 months ago  plan  - currently not in iron overload in setting of anemia above. CTM ferritin with blood transfusions.  - f/u testosterone lvl - hx hemochromatosis requiring monthly phlebotomies (confirmed with PCP Dr. Ponce) - last phlebotomy 3 months ago  plan  - currently not in iron overload in setting of anemia above. CTM ferritin with blood transfusions.  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 dx when he presented with L foot drop/weakness.   - used to see neuro at [facility], lost to follow up for last few years (pt says because of "pandemic")  plan  - MRI brain as above - f/u neuro recs  - heme onc on board - f/u additional recs no

## 2021-05-05 NOTE — PROGRESS NOTE ADULT - ASSESSMENT
Mr. Duran is a 41 y/o man with hx germinoma (brain) s/p chemo and radiation 2000, hx hemochromatosis requiring phlebotomies, has not followed up with doctors in 3 years presenting for L leg weakness/worsening foot drop pending MRI brain/spine to r/o cord compression/recurrence of germinoma, found to be microcytic anemic hb 6.1 s/p 1U PRBC 5/2 - iron panel concerning for GI malignancy/bleed vs myelodysplastic syndrome vs thalassemia (+ family hx).     FULL CODE  neuro exam - A&O x3, responds to all commands. +nystagmus, tongue fasciculations L foot drop, mild LLE weakness.

## 2021-05-05 NOTE — PROGRESS NOTE ADULT - PROBLEM SELECTOR PLAN 7
- on admission plt 237 -> 110 -> 104 today  - no heparin exposure. possible exposure to pantoprazole.   - discontinue IV pantoprazole BID -> reduced to PO 40mg QD. - on admission plt 237 -> 119 today. stabilized.   - no heparin exposure. possible exposure to pantoprazole.   - discontinue IV pantoprazole BID -> reduced to PO 40mg QD. leukopenia with wbc 3.11 on admission. diff wnl. today wnl.   - possible bone marrow suppression vs malignancy. afebrile, no infection sx including congestion/cough/fevers/chills/diarrhea. less likely infection.   plan  - CTM daily CBC  - f/u heme onc recs    #thrombocytopenia  - on admission plt 237 -> 119 today. stabilized.   - no heparin exposure. possible exposure to pantoprazole.   - discontinue IV pantoprazole BID -> reduced to PO 40mg QD.    #atrophic kidney  - likely 2/2 hx kidney stones requiring lithotripsy. CT AP this admission with non-obstructing right renal stone. Marked right renal scarring and atrophy. No hydronephrosis or mass.

## 2021-05-05 NOTE — PROGRESS NOTE ADULT - ATTENDING COMMENTS
Patient seen and examined, agree with above. Plan for EGD/colonoscopy tomorrow for iron deficiency anemia. Keep on clears today, NPO after midnight.

## 2021-05-05 NOTE — MEDICAL STUDENT PROGRESS NOTE(EDUCATION) - SUBJECTIVE AND OBJECTIVE BOX
PROGRESS NOTE:   ************************************************  Authored by: Cedric Etienne, MS3  *************************************************    40y Male w/ hx of brain germinoma s/p chemo and radiation (), hemochromatosis requiring phlebotomies, and thalassemia presenting with L leg weakness/ worsening foot drop and microcytic anemia (hgb 6.1 at admission).      INTERVAL/OVERNIGHT EVENTS:   - No acute events overnight.   - Patient says he has felt well since the blood transfusions. He says he is "less tired". He says his left leg weakness persists without change. He was able to get out of bed some yesterday both with PT and on his own. He denies any new symptoms, including N/V, chest pain, shortness of breath, overt bleeding, hematochezia, or hematuria.     [x] History per: patient    [x] There are no updates to the medical, surgical, social or family history unless described:    Review of Systems: History Per:   General: [ ] Neg  Pulmonary: [ ] Neg  Cardiac: [ ] Neg  Gastrointestinal: [ ] Neg  Ears, Nose, Throat: [ ] Neg  Renal/Urologic: [ ] Neg  Musculoskeletal: [ ] Neg  Endocrine: [ ] Neg  Hematologic: [ ] Neg  Neurologic: [ ] Neg  Allergy/Immunologic: [ ] Neg  All other systems reviewed and negative [x]     MEDICATIONS  (STANDING):  lactated ringers. 500 milliLiter(s) (50 mL/Hr) IV Continuous <Continuous>  pantoprazole  Injectable 40 milliGRAM(s) IV Push daily  polyethylene glycol/electrolyte Solution. 4000 milliLiter(s) Oral once    MEDICATIONS  (PRN):  acetaminophen   Tablet .. 650 milliGRAM(s) Oral every 6 hours PRN Mild Pain (1 - 3)  polyethylene glycol 3350 17 Gram(s) Oral daily PRN Constipation      Allergies    No Known Allergies    Intolerances      DIET:     PHYSICAL EXAM  Vital Signs Last 24 Hrs  T(C): 36.3 (05 May 2021 09:03), Max: 36.8 (04 May 2021 13:22)  T(F): 97.4 (05 May 2021 09:03), Max: 98.2 (04 May 2021 13:22)  HR: 70 (05 May 2021 09:03) (59 - 74)  BP: 105/70 (05 May 2021 09:03) (100/66 - 113/77)  BP(mean): --  RR: 18 (05 May 2021 09:03) (18 - 18)  SpO2: 99% (05 May 2021 09:03) (96% - 99%)    PATIENT CARE ACCESS DEVICES  [x] Peripheral IV  [ ] Central Venous Line, Date Placed:		Site/Device:  [ ] PICC, Date Placed:  [ ] Urinary Catheter, Date Placed:  [ ] Necessity of urinary, arterial, and venous catheters discussed    I&O's Summary    03 May 2021 07:01  -  04 May 2021 07:00  --------------------------------------------------------  IN: 1680 mL / OUT: 675 mL / NET: 1005 mL        Daily   BMI (kg/m2): 21.5 ( @ 11:08)    VS reviewed, stable.  Gen: patient is alert, smiling, interactive, well appearing, no acute distress including pain  HEENT: NC/AT, pupils equal, responsive, reactive to light and accomodation, +non-fatiguable nystagmus on horizontal end gaze (pt has always had trouble "focusing" on objects); visual fields full; no conjunctivitis or scleral icterus; no nasal discharge or congestion. OP without exudates/erythema; +tongue fasciculations  Neck: FROM, supple, no cervical LAD  Chest: CTA b/l, no crackles/wheezes, good air entry, no tachypnea or retractions  CV: regular rate and rhythm, no murmurs   Abd: soft, nontender, nondistended, no HSM appreciated, +BS  : deferred  Extrem: No joint effusion or tenderness; FROM of all joints; no deformities or erythema noted. 2+ peripheral pulses, WWP.   Neuro: CN II-XII intact. Strength in B/L UEs 5/5; Strength in RLE 5/5; Left dorsi/plantar flexion 4/5-- other LLE 5/5 strength; sensation intact and equal in b/l LEs and b/l UEs. High stepping gate with left foot; L patellar, achilles 3+, R pat/ach 2+; +babinski on left    INTERVAL LAB RESULTS:                         8.6    3.55  )-----------( 119      ( 05 May 2021 07:27 )             30.9                        8.1    4.47  )-----------( 109      ( 04 May 2021 07:21 )             29.3                         8.1    4.40  )-----------( 113      ( 03 May 2021 16:55 )             29.5                         6.7    3.72  )-----------( 104      ( 03 May 2021 07:45 )             24.3   05-05    141  |  104  |  11  ----------------------------<  93  4.0   |  22  |  1.02    Ca    9.7      05 May 2021 07:22  Phos  2.7     05-05  Mg     2.2     05-05    TPro  7.4  /  Alb  5.0  /  TBili  1.4<H>  /  DBili  x   /  AST  20  /  ALT  18  /  AlkPhos  60  05-05                              140    |  103    |  10                  Calcium: 9.6   / iCa: x      ( @ 07:22)    ----------------------------<  80        Magnesium: 2.3                              3.8     |  22     |  0.94             Phosphorous: 3.4      TPro  7.3    /  Alb  4.5    /  TBili  1.4    /  DBili  x      /  AST  17     /  ALT  17     /  AlkPhos  53     04 May 2021 07:22    Urinalysis Basic - ( 02 May 2021 18:32 )    Color: Light Yellow / Appearance: Clear / S.013 / pH: x  Gluc: x / Ketone: Negative  / Bili: Negative / Urobili: Negative   Blood: x / Protein: Negative / Nitrite: Negative   Leuk Esterase: Negative / RBC: 1 /hpf / WBC 0 /HPF   Sq Epi: x / Non Sq Epi: 0 /hpf / Bacteria: Negative          INTERVAL IMAGING STUDIES:  < from: CT Abdomen and Pelvis w/ IV Cont (21 @ 15:52) >  IMPRESSION:  No evidence of malignancy.  Moderate splenomegaly.    < end of copied text >  < from: CT Head No Cont (21 @ 12:03) >  IMPRESSION:Mild sulcal prominence suggesting mild volume loss which is nonspecific but may be related to chronic illness or certain medications. No masses. No change since 3/29/2012.    < end of copied text >  < from: MR Head w/wo IV Cont (21 @ 00:18) >  IMPRESSION:    Brain MRI: Unchanged encephalomalacia and gliosis within the genuine of the corpus callosum.    Increased signal intensity along the dorsal medulla without enhancement is nonspecific in appearance. Increasing number of foci of elevated signal intensity within the subcortical and periventricular white matter. Differential diagnosis includes ischemic, infectious/inflammatory and demyelinating processes. Cervical spine MRI with contrast may be valuable for further evaluation.    New small inferior left frontal meningioma.    Lumbar spine MRI: Unremarkable.    < end of copied text >

## 2021-05-05 NOTE — PROGRESS NOTE ADULT - SUBJECTIVE AND OBJECTIVE BOX
Patient is a 40y old  Male who presents with a chief complaint of Anemia (04 May 2021 12:16)      INTERVAL HISTORY: feels ok, waiting for egd today     REVIEW OF SYSTEMS:   CONSTITUTIONAL: No weakness  EYES/ENT: No visual changes; No throat pain  Neck: No pain or stiffness  Respiratory: No cough, wheezing, No shortness of breath  CARDIOVASCULAR: no chest pain or palpitations  GASTROINTESTINAL: No abdominal pain, no nausea, vomiting or hematemesis  GENITOURINARY: No dysuria, frequency or hematuria  NEUROLOGICAL: No stroke like symptoms  SKIN: No rashes    	MEDICATIONS:    PHYSICAL EXAM:  T(C): 36.5 (05-05-21 @ 12:55), Max: 36.6 (05-04-21 @ 20:19)  HR: 59 (05-05-21 @ 12:55) (59 - 74)  BP: 106/71 (05-05-21 @ 12:55) (100/66 - 113/77)  RR: 18 (05-05-21 @ 12:55) (18 - 18)  SpO2: 99% (05-05-21 @ 12:55) (96% - 99%)  Wt(kg): --  I&O's Summary    04 May 2021 07:01  -  05 May 2021 07:00  --------------------------------------------------------  IN: 1320 mL / OUT: 1700 mL / NET: -380 mL    Appearance: In no distress	  HEENT:    PERRL, EOMI	  Cardiovascular:  S1 S2, No JVD  Respiratory: Lungs clear to auscultation	  Gastrointestinal:  Soft, Non-tender, + BS	  Vascularature:  No edema of LE  Psychiatric: Appropriate affect   Neuro: no acute focal deficits                         8.6    3.55  )-----------( 119      ( 05 May 2021 07:27 )             30.9     05-05    141  |  104  |  11  ----------------------------<  93  4.0   |  22  |  1.02    Ca    9.7      05 May 2021 07:22  Phos  2.7     05-05  Mg     2.2     05-05    TPro  7.4  /  Alb  5.0  /  TBili  1.4<H>  /  DBili  x   /  AST  20  /  ALT  18  /  AlkPhos  60  05-05  Labs personally reviewed    ASSESSMENT/PLAN: 	  Mr. Duran is a 41 y/o man with hx germinoma (brain) s/p chemo and radiation 2000, hx hemochromatosis requiring phlebotomies, has not followed up with doctors in 3 years presenting for L leg weakness/worsening foot drop pending MRI brain/spine to r/o cord compression/recurrence of germinoma, found to be microcytic anemic hb 6.1 s/p 1U PRBC 5/2 - iron panel concerning for GI malignancy/bleed vs myelodysplastic syndrome vs thalassemia (+ family hx).       Problem/Plan - 1:   ·  Problem: SOB.  Plan: Overall weakness and SOB 2/2 significant anemia of 6.1  - Transfuse PRBC to keep hb >7  - pending GI workup with egd/colonoscopy pending neuro final reccs  - EKG with NSR no ischemic changes    Problem/Plan - 2:  ·  Problem: Anemia.  Plan: - admitted with hb 6.1  - clarified hemochromatosis Hx with PCP, never had anemia prior from it  - on the contrary hx hemochromatosis requiring phlebotomy every month for few years, last done ~3 years ago per pt.   - etiology of present anemia: denies melena/ hematoschezia, no hemoptysis/hematemesis, no hematuria. He is FOBT positive  - ? chronic slow GI blood loss anemia over   - CT abd with moderate splenomegaly  - f/u hemoglobin electropheresis  -Iron panel with severe iron def anemia   - Heme/onc recs and follow up appreciated   - plan for EGD/colonscopy today   - pending MRI thoracic and cervical spine.     Problem/Plan - 3:  ·  Problem: Germinoma.  Plan: - hx germinoma (brain) dx in 2000 s/p chemo/radiation in 4468-3831. dx when he presented with L foot drop/weakness.   -pending CT A/P malignancy workup   - MRI head Increased signal intensity along the dorsal medulla without enhancement is nonspecific in appearance. Increasing number of foci of elevated signal intensity within the subcortical and periventricular white matter. Differential diagnosis includes ischemic, infectious/inflammatory and demyelinating processes.   - Heme/onc follow up     Problem/Plan - 4:  ·  Problem: Hemochromatosis.  Plan: - hx hemochromatosis requiring monthly phlebotomies (confirmed with PCP Dr. Ponce) - last phlebotomy 3 months ago  plan  - iron panel consistent with severe Iron def anemia, suspicious for blood loss anemia   - heme rec MRI liver and genetic testing for hemachromatosis     Problem/Plan - 5:  ·  Problem: Leukopenia.  Plan: leukopenia with wbc 3.11. diff wnl.   - possible bone marrow suppression vs malignancy. afebrile, no infection sx including congestion/cough/fevers/chills/diarrhea. Less likely infection.   plan  - CTM daily CBC  - f/u heme onc recs.     Problem/Plan - 6:  Problem: Fasciculation of tongue. Plan: - fasciulation of tongue observed on exam  - etiology: benign, ALS.    Problem/Plan - 7:  ·  Problem: Nystagmus.  Plan: - horizontal nystagmus with lateral eye movements - pt states has had before, is not sure cause.   - etiology: cerebellum lesion though no dysmetria and no dyskinesia.     Problem/Plan - 8:  ·  Problem: Abnormal MRI brain.  Plan: MRI head Increased signal intensity along the dorsal medulla without enhancement is nonspecific in appearance. Increasing number of foci of elevated signal intensity within the subcortical and periventricular white matter. Differential diagnosis includes ischemic, infectious/inflammatory and demyelinating processes.   - neurology follow up noted         Emily Smith DO [facility]  Cardiovascular Medicine  [address], [address]  Office: [phone]  Cell: [phone] Patient is a 40y old  Male who presents with a chief complaint of Anemia (04 May 2021 12:16)      INTERVAL HISTORY: feels ok, waiting for egd today     REVIEW OF SYSTEMS:   CONSTITUTIONAL: No weakness  EYES/ENT: No visual changes; No throat pain  Neck: No pain or stiffness  Respiratory: No cough, wheezing, No shortness of breath  CARDIOVASCULAR: no chest pain or palpitations  GASTROINTESTINAL: No abdominal pain, no nausea, vomiting or hematemesis  GENITOURINARY: No dysuria, frequency or hematuria  NEUROLOGICAL: No stroke like symptoms  SKIN: No rashes    	MEDICATIONS:    PHYSICAL EXAM:  T(C): 36.5 (05-05-21 @ 12:55), Max: 36.6 (05-04-21 @ 20:19)  HR: 59 (05-05-21 @ 12:55) (59 - 74)  BP: 106/71 (05-05-21 @ 12:55) (100/66 - 113/77)  RR: 18 (05-05-21 @ 12:55) (18 - 18)  SpO2: 99% (05-05-21 @ 12:55) (96% - 99%)  Wt(kg): --  I&O's Summary    04 May 2021 07:01  -  05 May 2021 07:00  --------------------------------------------------------  IN: 1320 mL / OUT: 1700 mL / NET: -380 mL    Appearance: In no distress	  HEENT:    PERRL, EOMI	  Cardiovascular:  S1 S2, No JVD  Respiratory: Lungs clear to auscultation	  Gastrointestinal:  Soft, Non-tender, + BS	  Vascularature:  No edema of LE  Psychiatric: Appropriate affect   Neuro: no acute focal deficits                         8.6    3.55  )-----------( 119      ( 05 May 2021 07:27 )             30.9     05-05    141  |  104  |  11  ----------------------------<  93  4.0   |  22  |  1.02    Ca    9.7      05 May 2021 07:22  Phos  2.7     05-05  Mg     2.2     05-05    TPro  7.4  /  Alb  5.0  /  TBili  1.4<H>  /  DBili  x   /  AST  20  /  ALT  18  /  AlkPhos  60  05-05  Labs personally reviewed    ASSESSMENT/PLAN: 	  Mr. Duran is a 41 y/o man with hx germinoma (brain) s/p chemo and radiation 2000, hx hemochromatosis requiring phlebotomies, has not followed up with doctors in 3 years presenting for L leg weakness/worsening foot drop pending MRI brain/spine to r/o cord compression/recurrence of germinoma, found to be microcytic anemic hb 6.1 s/p 1U PRBC 5/2 - iron panel concerning for GI malignancy/bleed vs myelodysplastic syndrome vs thalassemia (+ family hx).       Problem/Plan - 1:   ·  Problem: SOB.  Plan: Overall weakness and SOB 2/2 significant anemia of 6.1  - Transfuse PRBC to keep hb >7  - pending GI workup with egd/colonoscopy  - EKG with NSR no ischemic changes    Problem/Plan - 2:  ·  Problem: Anemia.  Plan: - admitted with hb 6.1  - clarified hemochromatosis Hx with PCP, never had anemia prior from it  - on the contrary hx hemochromatosis requiring phlebotomy every month for few years, last done ~3 years ago per pt.   - etiology of present anemia: denies melena/ hematoschezia, no hemoptysis/hematemesis, no hematuria. He is FOBT positive  - ? chronic slow GI blood loss anemia over   - CT abd with moderate splenomegaly  - f/u hemoglobin electropheresis  -Iron panel with severe iron def anemia   - Heme/onc recs and follow up appreciated   - plan for EGD today/colonscopy today/tomorrow   - pending MRI thoracic and cervical spine.     Problem/Plan - 3:  ·  Problem: Germinoma.  Plan: - hx germinoma (brain) dx in 2000 s/p chemo/radiation in [id number]. dx when he presented with L foot drop/weakness.   -pending CT A/P malignancy workup   - MRI head Increased signal intensity along the dorsal medulla without enhancement is nonspecific in appearance. Increasing number of foci of elevated signal intensity within the subcortical and periventricular white matter. Differential diagnosis includes ischemic, infectious/inflammatory and demyelinating processes.   - Heme/onc follow up     Problem/Plan - 4:  ·  Problem: Hemochromatosis.  Plan: - hx hemochromatosis requiring monthly phlebotomies (confirmed with PCP Dr. Ponce) - last phlebotomy 3 months ago  plan  - iron panel consistent with severe Iron def anemia, suspicious for blood loss anemia   - heme rec MRI liver and genetic testing for hemachromatosis     Problem/Plan - 5:  ·  Problem: Leukopenia.  Plan: leukopenia with wbc 3.11. diff wnl.   - possible bone marrow suppression vs malignancy. afebrile, no infection sx including congestion/cough/fevers/chills/diarrhea. Less likely infection.   plan  - CTM daily CBC  - f/u heme onc recs.     Problem/Plan - 6:  Problem: Fasciculation of tongue. Plan: - fasciulation of tongue observed on exam  - etiology: benign    Problem/Plan - 7:  ·  Problem: Nystagmus.  Plan: - horizontal nystagmus with lateral eye movements - pt states has had before, is not sure cause.   - etiology: cerebellum lesion though no dysmetria and no dyskinesia.     Problem/Plan - 8:  ·  Problem: Abnormal MRI brain.  Plan: MRI head Increased signal intensity along the dorsal medulla without enhancement is nonspecific in appearance. Increasing number of foci of elevated signal intensity within the subcortical and periventricular white matter. Differential diagnosis includes ischemic, infectious/inflammatory and demyelinating processes.   - neurology follow up noted   - family to bring in prior MRI to compare to        Advanced care planning/advanced directives discussed with patient/family. DNR status including forceful chest compressions to attempt to restart the heart, ventilator support/artificial breathing, electric shock, artificial nutrition, health care proxy, Molst form all discussed with pt. Pt wishes to consider.  More than fifteen minutes spent on discussing advanced directives. OMT on six regions for acute somatic dysfunctions done at the bedside     Emily Smith DO [facility]  Cardiovascular Medicine  [address], [address]  Office: [phone]  Cell: [phone]

## 2021-05-06 ENCOUNTER — RESULT REVIEW (OUTPATIENT)
Age: 41
End: 2021-05-06

## 2021-05-06 LAB
% ALBUMIN: 61.4 % — SIGNIFICANT CHANGE UP
% ALPHA 1: 4.3 % — SIGNIFICANT CHANGE UP
% ALPHA 2: 7.4 % — SIGNIFICANT CHANGE UP
% BETA: 11.3 % — SIGNIFICANT CHANGE UP
% GAMMA: 15.6 % — SIGNIFICANT CHANGE UP
% M SPIKE: 1.8 % — SIGNIFICANT CHANGE UP
ALBUMIN SERPL ELPH-MCNC: 4.2 G/DL — SIGNIFICANT CHANGE UP (ref 3.6–5.5)
ALBUMIN SERPL ELPH-MCNC: 4.7 G/DL — SIGNIFICANT CHANGE UP (ref 3.3–5)
ALBUMIN/GLOB SERPL ELPH: 1.6 RATIO — SIGNIFICANT CHANGE UP
ALP SERPL-CCNC: 57 U/L — SIGNIFICANT CHANGE UP (ref 40–120)
ALPHA1 GLOB SERPL ELPH-MCNC: 0.3 G/DL — SIGNIFICANT CHANGE UP (ref 0.1–0.4)
ALPHA2 GLOB SERPL ELPH-MCNC: 0.5 G/DL — SIGNIFICANT CHANGE UP (ref 0.5–1)
ALT FLD-CCNC: 14 U/L — SIGNIFICANT CHANGE UP (ref 10–45)
ANION GAP SERPL CALC-SCNC: 15 MMOL/L — SIGNIFICANT CHANGE UP (ref 5–17)
APTT BLD: 45.3 SEC — HIGH (ref 27.5–35.5)
AST SERPL-CCNC: 16 U/L — SIGNIFICANT CHANGE UP (ref 10–40)
B-GLOBULIN SERPL ELPH-MCNC: 0.8 G/DL — SIGNIFICANT CHANGE UP (ref 0.5–1)
B19V DNA FLD QL NAA+PROBE: SIGNIFICANT CHANGE UP IU/ML
BASOPHILS # BLD AUTO: 0.01 K/UL — SIGNIFICANT CHANGE UP (ref 0–0.2)
BASOPHILS NFR BLD AUTO: 0.3 % — SIGNIFICANT CHANGE UP (ref 0–2)
BILIRUB SERPL-MCNC: 1.5 MG/DL — HIGH (ref 0.2–1.2)
BUN SERPL-MCNC: 10 MG/DL — SIGNIFICANT CHANGE UP (ref 7–23)
CALCIUM SERPL-MCNC: 9.7 MG/DL — SIGNIFICANT CHANGE UP (ref 8.4–10.5)
CHLORIDE SERPL-SCNC: 103 MMOL/L — SIGNIFICANT CHANGE UP (ref 96–108)
CO2 SERPL-SCNC: 24 MMOL/L — SIGNIFICANT CHANGE UP (ref 22–31)
COPPER SERPL-MCNC: 80 UG/DL — SIGNIFICANT CHANGE UP (ref 69–132)
CREAT SERPL-MCNC: 1.1 MG/DL — SIGNIFICANT CHANGE UP (ref 0.5–1.3)
EOSINOPHIL # BLD AUTO: 0.09 K/UL — SIGNIFICANT CHANGE UP (ref 0–0.5)
EOSINOPHIL NFR BLD AUTO: 2.9 % — SIGNIFICANT CHANGE UP (ref 0–6)
GAMMA GLOBULIN: 1.1 G/DL — SIGNIFICANT CHANGE UP (ref 0.6–1.6)
GLUCOSE SERPL-MCNC: 83 MG/DL — SIGNIFICANT CHANGE UP (ref 70–99)
HCT VFR BLD CALC: 27.9 % — LOW (ref 39–50)
HGB BLD-MCNC: 7.6 G/DL — LOW (ref 13–17)
INR BLD: 1.13 RATIO — SIGNIFICANT CHANGE UP (ref 0.88–1.16)
INTERPRETATION SERPL IFE-IMP: SIGNIFICANT CHANGE UP
LYMPHOCYTES # BLD AUTO: 0.79 K/UL — LOW (ref 1–3.3)
LYMPHOCYTES # BLD AUTO: 25.6 % — SIGNIFICANT CHANGE UP (ref 13–44)
M-SPIKE: 0.1 G/DL — HIGH (ref 0–0)
MAGNESIUM SERPL-MCNC: 2.2 MG/DL — SIGNIFICANT CHANGE UP (ref 1.6–2.6)
MCHC RBC-ENTMCNC: 15.3 PG — LOW (ref 27–34)
MCHC RBC-ENTMCNC: 27.2 GM/DL — LOW (ref 32–36)
MCV RBC AUTO: 56 FL — LOW (ref 80–100)
MONOCYTES # BLD AUTO: 0.31 K/UL — SIGNIFICANT CHANGE UP (ref 0–0.9)
MONOCYTES NFR BLD AUTO: 10.1 % — SIGNIFICANT CHANGE UP (ref 2–14)
NEUTROPHILS # BLD AUTO: 1.88 K/UL — SIGNIFICANT CHANGE UP (ref 1.8–7.4)
NEUTROPHILS NFR BLD AUTO: 61.1 % — SIGNIFICANT CHANGE UP (ref 43–77)
NRBC # BLD: 0 /100 WBCS — SIGNIFICANT CHANGE UP (ref 0–0)
PHOSPHATE SERPL-MCNC: 3.4 MG/DL — SIGNIFICANT CHANGE UP (ref 2.5–4.5)
PLATELET # BLD AUTO: 113 K/UL — LOW (ref 150–400)
POTASSIUM SERPL-MCNC: 4.3 MMOL/L — SIGNIFICANT CHANGE UP (ref 3.5–5.3)
POTASSIUM SERPL-SCNC: 4.3 MMOL/L — SIGNIFICANT CHANGE UP (ref 3.5–5.3)
PROT PATTERN SERPL ELPH-IMP: SIGNIFICANT CHANGE UP
PROT SERPL-MCNC: 7.4 G/DL — SIGNIFICANT CHANGE UP (ref 6–8.3)
PROTHROM AB SERPL-ACNC: 13.5 SEC — SIGNIFICANT CHANGE UP (ref 10.6–13.6)
RBC # BLD: 4.98 M/UL — SIGNIFICANT CHANGE UP (ref 4.2–5.8)
RBC # FLD: 31 % — HIGH (ref 10.3–14.5)
SODIUM SERPL-SCNC: 142 MMOL/L — SIGNIFICANT CHANGE UP (ref 135–145)
WBC # BLD: 3.08 K/UL — LOW (ref 3.8–10.5)
WBC # FLD AUTO: 3.08 K/UL — LOW (ref 3.8–10.5)

## 2021-05-06 PROCEDURE — 99233 SBSQ HOSP IP/OBS HIGH 50: CPT | Mod: GC

## 2021-05-06 PROCEDURE — 43239 EGD BIOPSY SINGLE/MULTIPLE: CPT | Mod: GC

## 2021-05-06 PROCEDURE — 45378 DIAGNOSTIC COLONOSCOPY: CPT | Mod: GC

## 2021-05-06 PROCEDURE — 88305 TISSUE EXAM BY PATHOLOGIST: CPT | Mod: 26

## 2021-05-06 RX ORDER — IRON SUCROSE 20 MG/ML
200 INJECTION, SOLUTION INTRAVENOUS ONCE
Refills: 0 | Status: COMPLETED | OUTPATIENT
Start: 2021-05-06 | End: 2021-05-06

## 2021-05-06 RX ADMIN — Medication 4000 MILLILITER(S): at 04:27

## 2021-05-06 RX ADMIN — Medication 116 MILLIGRAM(S): at 22:01

## 2021-05-06 RX ADMIN — PANTOPRAZOLE SODIUM 40 MILLIGRAM(S): 20 TABLET, DELAYED RELEASE ORAL at 05:06

## 2021-05-06 RX ADMIN — IRON SUCROSE 110 MILLIGRAM(S): 20 INJECTION, SOLUTION INTRAVENOUS at 18:47

## 2021-05-06 NOTE — PRE-ANESTHESIA EVALUATION ADULT - NSANTHOSAYNRD_GEN_A_CORE
No. ITALO screening performed.  STOP BANG Legend: 0-2 = LOW Risk; 3-4 = INTERMEDIATE Risk; 5-8 = HIGH Risk

## 2021-05-06 NOTE — PRE PROCEDURE NOTE - PRE PROCEDURE EVALUATION
Attending Physician: Luis A                           Procedure: EGD/colon    Indication for Procedure: anemia  ________________________________________________________  PAST MEDICAL & SURGICAL HISTORY:  Germinoma    Hemochromatosis      ALLERGIES:  No Known Allergies    HOME MEDICATIONS:    AICD/PPM: [ ] yes   [ X ] no    PERTINENT LAB DATA:                        7.6    3.08  )-----------( 113      ( 06 May 2021 07:13 )             27.9     05-06    142  |  103  |  10  ----------------------------<  83  4.3   |  24  |  1.10    Ca    9.7      06 May 2021 07:19  Phos  3.4     05-06  Mg     2.2     05-06    TPro  7.4  /  Alb  4.7  /  TBili  1.5<H>  /  DBili  x   /  AST  16  /  ALT  14  /  AlkPhos  57  05-06    PT/INR - ( 06 May 2021 07:15 )   PT: 13.5 sec;   INR: 1.13 ratio         PTT - ( 06 May 2021 07:15 )  PTT:45.3 sec  CARDIAC MARKERS ( 05 May 2021 07:22 )  x     / x     / 41 U/L / x     / x                PHYSICAL EXAMINATION:    Height (cm): 177.8  Weight (kg): 68  BMI (kg/m2): 21.5  BSA (m2): 1.85T(C): 36.6  HR: 68  BP: 115/78  RR: 14  SpO2: 98%    Constitutional: NAD    Neck:  No JVD  Respiratory: CTAB/L  Cardiovascular: S1 and S2  Gastrointestinal: BS+, soft, NT/ND  Extremities: No peripheral edema  Neurological: A/O x 3, no focal deficits        COMMENTS:    The patient is a suitable candidate for the planned procedure unless box checked [ ]  No, explain:

## 2021-05-06 NOTE — PROGRESS NOTE ADULT - PROBLEM SELECTOR PLAN 3
- 1wk of L leg weakness with worse foot drop and gait difficulty. foot drop present on gait exam. pt had L foot drop since germinoma in 2000 and had high steppage but over last week does not have strength for this and dragging L foot.    - possible cord compression (though foot drop is lower motor neuron) in setting of germinoma with lost to follow up for few years without imaging. no incontinence, weakness of R leg. possible ALS.   plan  - f/u MRI lumbar spine. f/u MRI brain - shows unchanged encephalomalacia and gliosis within the genuine of the corpus callosum. Increased signal intensity along the dorsal medulla without enhancement is nonspecific in appearance. Increasing number of foci of elevated signal intensity within the subcortical and periventricular white matter. Differential diagnosis includes ischemic, infectious/inflammatory and demyelinating processes. Cervical spine MRI with contrast may be valuable for further evaluation. New small inferior left frontal meningioma 3.5 x 8.4 mm. Lumbar spine MRI: Unremarkable.  - neuro was consulted in ED - f/u final recs. - 1wk of L leg weakness with worse foot drop and gait difficulty. foot drop present on gait exam. pt had L foot drop since germinoma in 2000 and had high steppage but over last week does not have strength for this and dragging L foot.    - possible cord compression (though foot drop is lower motor neuron) in setting of germinoma with lost to follow up for few years without imaging. no incontinence, weakness of R leg. possible ALS. possible MM.   plan  - f/u MRI lumbar spine. f/u MRI brain - shows unchanged encephalomalacia and gliosis within the genuine of the corpus callosum. Increased signal intensity along the dorsal medulla without enhancement is nonspecific in appearance. Increasing number of foci of elevated signal intensity within the subcortical and periventricular white matter. Differential diagnosis includes ischemic, infectious/inflammatory and demyelinating processes. Cervical spine MRI with contrast may be valuable for further evaluation. New small inferior left frontal meningioma 3.5 x 8.4 mm. Lumbar spine MRI: Unremarkable.  - MRI cervical/thoracic spine - Enhancing left cord lesion at C4-C5 most compatible with acute demyelination. Less likely consideration is metastasis in this patient with history of germinoma. Multiple additional nonenhancing lesions throughout the spinal cord, most compatible with chronic demyelination. Correlate with symptomatology and CSF analysis. 2.0 cm right thyroid nodule. Recommend further evaluation by ultrasound.  - neuro - start solumedrol 1g daily, concern for MS, will do LP for evaluation.

## 2021-05-06 NOTE — PROGRESS NOTE ADULT - SUBJECTIVE AND OBJECTIVE BOX
Patient is a 40y old  Male who presents with a chief complaint of Anemia (04 May 2021 12:16)      INTERVAL HISTORY: EGD today    REVIEW OF SYSTEMS:   CONSTITUTIONAL: No weakness  EYES/ENT: No visual changes; No throat pain  Neck: No pain or stiffness  Respiratory: No cough, wheezing, No shortness of breath  CARDIOVASCULAR: no chest pain or palpitations  GASTROINTESTINAL: No abdominal pain, no nausea, vomiting or hematemesis  GENITOURINARY: No dysuria, frequency or hematuria  NEUROLOGICAL: No stroke like symptoms  SKIN: No rashes  	  MEDICATIONS:    PHYSICAL EXAM:  T(C): 36.6 (05-06-21 @ 11:20), Max: 36.7 (05-06-21 @ 00:36)  HR: 68 (05-06-21 @ 13:45) (61 - 77)  BP: 124/85 (05-06-21 @ 13:45) (111/74 - 124/85)  RR: 21 (05-06-21 @ 13:45) (13 - 21)  SpO2: 100% (05-06-21 @ 13:45) (97% - 100%)  Wt(kg): --  I&O's Summary    05 May 2021 07:01  -  06 May 2021 07:00  --------------------------------------------------------  IN: 1170 mL / OUT: 0 mL / NET: 1170 mL      Height (cm): 177.8 (05-06 @ 11:20)  Weight (kg): 68 (05-06 @ 11:20)  BMI (kg/m2): 21.5 (05-06 @ 11:20)  BSA (m2): 1.85 (05-06 @ 11:20)    Appearance: In no distress	  HEENT:    PERRL, EOMI	  Cardiovascular:  S1 S2, No JVD  Respiratory: Lungs clear to auscultation	  Gastrointestinal:  Soft, Non-tender, + BS	  Vascularature:  No edema of LE  Psychiatric: Appropriate affect   Neuro: no acute focal deficits              7.6    3.08  )-----------( 113      ( 06 May 2021 07:13 )             27.9     05-06    142  |  103  |  10  ----------------------------<  83  4.3   |  24  |  1.10    Ca    9.7      06 May 2021 07:19  Phos  3.4     05-06  Mg     2.2     05-06    TPro  7.4  /  Alb  4.7  /  TBili  1.5<H>  /  DBili  x   /  AST  16  /  ALT  14  /  AlkPhos  57  05-06  Labs personally reviewed    ASSESSMENT/PLAN: 	  Mr. Duran is a 41 y/o man with hx germinoma (brain) s/p chemo and radiation 2000, hx hemochromatosis requiring phlebotomies, has not followed up with doctors in 3 years presenting for L leg weakness/worsening foot drop pending MRI brain/spine to r/o cord compression/recurrence of germinoma, found to be microcytic anemic hb 6.1 s/p 1U PRBC 5/2 - iron panel concerning for GI malignancy/bleed vs myelodysplastic syndrome vs thalassemia (+ family hx).       Problem/Plan - 1:   ·  Problem: SOB.  Plan: Overall weakness and SOB 2/2 significant anemia of 6.1  - Transfuse PRBC to keep hb >7  - pending GI workup with egd/colonoscopy  - EKG with NSR no ischemic changes    Problem/Plan - 2:  ·  Problem: Anemia.  Plan: - admitted with hb 6.1  - clarified hemochromatosis Hx with PCP, never had anemia prior from it  - on the contrary hx hemochromatosis requiring phlebotomy every month for few years, last done ~3 years ago per pt.   - etiology of present anemia: denies melena/ hematochezia no hemoptysis/hematemesis, no hematuria. He is FOBT positive  - ? chronic slow GI blood loss anemia over   - CT abd with moderate splenomegaly  - f/u hemoglobin electropheresis  -Iron panel with severe iron def anemia   - Heme/onc recs and follow up appreciated   - EGD/ Colonoscopy with no obvious source of bleed, f/u biopsies   - possible LP tomorrow     Problem/Plan - 3:  ·  Problem: Germinoma.  Plan: - hx germinoma (brain) dx in 2000 s/p chemo/radiation in [id number]. dx when he presented with L foot drop/weakness.   -pending CT A/P malignancy workup   - MRI head Increased signal intensity along the dorsal medulla without enhancement is nonspecific in appearance. Increasing number of foci of elevated signal intensity within the subcortical and periventricular white matter. Differential diagnosis includes ischemic, infectious/inflammatory and demyelinating processes.   - Heme/onc follow up     Problem/Plan - 4:  ·  Problem: Hemochromatosis.  Plan: - hx hemochromatosis requiring monthly phlebotomies (confirmed with PCP Dr. Ponce) - last phlebotomy 3 months ago  plan  - iron panel consistent with severe Iron def anemia, suspicious for blood loss anemia   - heme rec MRI liver and genetic testing for hemachromatosis     Problem/Plan - 5:  ·  Problem: Leukopenia.  Plan: leukopenia with wbc 3.11. diff wnl.   - possible bone marrow suppression vs malignancy. afebrile, no infection sx including congestion/cough/fevers/chills/diarrhea. Less likely infection.   plan  - CTM daily CBC  - f/u heme onc recs.     Problem/Plan - 6:  Problem: Fasciculation of tongue. Plan: - fasciulation of tongue observed on exam  - etiology: benign    Problem/Plan - 7:  ·  Problem: Nystagmus.  Plan: - horizontal nystagmus with lateral eye movements - pt states has had before, is not sure cause.   - etiology: cerebellum lesion though no dysmetria and no dyskinesia.     Problem/Plan - 8:  ·  Problem: Abnormal MRI brain.  Plan: MRI head Increased signal intensity along the dorsal medulla without enhancement is nonspecific in appearance. Increasing number of foci of elevated signal intensity within the subcortical and periventricular white matter. Differential diagnosis includes ischemic, infectious/inflammatory and demyelinating processes.   -MR Thoracic and Cervical Spine w/wo IV Cont were done and demonstrated: enhancing left cord lesion at C4-C5 most compatible with acute demyelination.  - neurology follow up noted   - LP planning for tomorrow   - 5 days of IV Solumedrol         Emily Flores ANP-ROEL Smith DO [facility]  Cardiovascular Medicine  [address], [address]  Office: [phone]  Cell: [phone]

## 2021-05-06 NOTE — MEDICAL STUDENT PROGRESS NOTE(EDUCATION) - NS MD HP STUD ASPLAN PLAN FT
1. Iron deficiency anemia (MCV 50, ferritin 2) with inappropriate bone marrow response (retic 0.7%): consider 2/2 myelodysplastic syndrome (2/2 chemoradiation hx) +/- acquired alpha thalassemia/ hgb H (occurs in <10% of MDS) vs. blood loss (ie GI malignancy) vs. 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  - c/w pantoprazole IV 40mg for GI bleeding ppx  - f/u hgb electrophoresis: beta thal minor  - f/u zn, copper, heavy metal: negative  - f/u anti-transglutaminase: negative  - heme recs: no need for bone marrow bx at this time given-- GI bleed more likely    2. New L leg weakness: consider new CNS malignancy vs. ALS vs. MS vs. residual deficit (from past germinoma) w/ worsening fatigue (from current anemia); MRI head 5/3: Increased signal intensity along the dorsal medulla without enhancement is nonspecific in appearance. Increasing number of foci of elevated signal intensity within the subcortical and periventricular white matter. Differential diagnosis includes ischemic, infectious/inflammatory and demyelinating processes; MRI L-spine unremarkable; AFP, hCG wnl  - f/u MRI cervical, thoracic  - OT/PT following  - f/u neuro recs: pending review of old MRIs, consider LP    3. 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 Hx of hemochromatosis w/ phlebotomy management  - monitor ferritin w/ blood transfusions  - recs as per heme/onc    6. Leukopenia: consider 2/2 MDS/ bone malignancy; infection appears less likely given afebrile, vitals stable, no source  - CBC daily    7. Thrombocytopenia: consider 2/2 to new PPI vs. bone malignancy   - PPI downtitrated from BID to qd 5/3  - CBC daily    8. Tongue fasciculation consider 2/2 to prior brain germinoma vs new malignancy vs SMA vs. benign finding  - f/u neuro recs  - f/u brain mri    9. Nystagmus: old finding per patient, perhaps 2/2 to prior germinoma  - f/u neuro recs  - f/u brain mri    10. DVT: SCD (given possible GI bleed, profound anemia)    11. FENGI: regular diet; can d/c LR   1. Iron deficiency anemia (MCV 50, ferritin 2) with inappropriate bone marrow response (retic 0.7%): consider 2/2 myelodysplastic syndrome (2/2 chemoradiation hx) +/- acquired alpha thalassemia/ hgb H (occurs in <10% of MDS) vs. blood loss (ie GI malignancy) vs. 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 New L leg weakness: likely 2/2 chronic demyelinating process (ie MS vs. NMO vs. paraneoplastic) vs metastasis given MRI c/t-spine 5/5 revealing "enhancing left cord lesion at C4-C5 and Multiple additional nonenhancing lesions throughout the spinal cord." MRI head 5/3: Increased signal intensity along the dorsal medulla without enhancement is nonspecific in appearance. Increasing number of foci of elevated signal intensity within the subcortical and periventricular white matter. MRI L-spine unremarkable; AFP, hCG wnl;   - Neuro to do LP today  - Start IV solumedrol 1g x 5 days (5/6- )  - OT/PT following  - appreciate neuro recs    3. 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 Hx of hemochromatosis w/ phlebotomy management  - MRI liver pending  - monitor ferritin w/ blood transfusions  - recs as per heme/onc    6. Leukopenia: consider 2/2 MDS/ bone malignancy; infection appears less likely given afebrile, vitals stable, no source  - CBC daily    7. Thrombocytopenia: consider 2/2 to new PPI vs. bone malignancy -- stable 110s  - PPI downtitrated from BID to qd 5/3  - CBC daily    8. Tongue fasciculation consider 2/2 to demyelinating process as above vs prior brain germinoma vs new malignancy vs SMA vs. benign finding  - f/u neuro recs  - f/u brain mri    9. Nystagmus: old finding per patient, perhaps 2/2 to prior germinoma vs chronic demyelinating process  - f/u neuro recs  - f/u brain mri    10. DVT: SCD (given possible GI bleed, profound anemia)    11. FENGI: regular diet   1. Iron deficiency anemia (MCV 50, ferritin 2) with inappropriate bone marrow response (retic 0.7%): consider 2/2 myelodysplastic syndrome (2/2 chemoradiation hx) +/- acquired alpha thalassemia/ hgb H (occurs in <10% of MDS) vs. blood loss (ie GI malignancy) vs. 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 New L leg weakness: likely 2/2 chronic demyelinating process (ie MS vs. NMO vs. paraneoplastic) vs metastasis given MRI c/t-spine 5/5 revealing "enhancing left cord lesion at C4-C5 and Multiple additional nonenhancing lesions throughout the spinal cord." MRI head 5/3: Increased signal intensity along the dorsal medulla without enhancement is nonspecific in appearance. Increasing number of foci of elevated signal intensity within the subcortical and periventricular white matter. MRI L-spine unremarkable; AFP, hCG wnl;   - Neuro to do LP today: to test oligoclonal bands, anti-aqp4, anti-MOG, paraneoplastic panel (anti-hu, yo, ri, tr, CV2/CRMP5, Ma1, Ma2, VGCC, amphiphysin, PCA-2, KLHL-11, recoverin), cytopathology, flow cytometry, viral encephalitis panel, bacterial cx  - Start IV solumedrol 1g x 5 days (5/6- )  - to order serum anti-aquaporin-4 (AQP4) IgG autoantibodies (associated with NMOSD) and anti-MOG IgG autoantibodies  - OT/PT following  - appreciate neuro recs    3. 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 Hx of hemochromatosis w/ phlebotomy management  - MRI liver pending  - monitor ferritin w/ blood transfusions  - recs as per heme/onc    6. Leukopenia: consider 2/2 MDS/ bone malignancy; infection appears less likely given afebrile, vitals stable, no source  - CBC daily    7. Thrombocytopenia: consider 2/2 to new PPI vs. bone malignancy -- stable 110s  - PPI downtitrated from BID to qd 5/3  - CBC daily    8. Tongue fasciculation consider 2/2 to demyelinating process as above vs prior brain germinoma vs new malignancy vs SMA vs. benign finding  - f/u neuro recs  - f/u brain mri    9. Nystagmus: old finding per patient, perhaps 2/2 to prior germinoma vs chronic demyelinating process  - f/u neuro recs  - f/u brain mri    10. DVT: SCD (given possible GI bleed, profound anemia)    11. FENGI: regular diet   1. Iron deficiency anemia (MCV 50, ferritin 2) with inappropriate bone marrow response (retic 0.7%): consider 2/2 myelodysplastic syndrome (2/2 chemoradiation hx) +/- acquired alpha thalassemia/ hgb H (occurs in <10% of MDS) vs. blood loss (ie GI malignancy) vs. 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 New L leg weakness: likely 2/2 chronic demyelinating process (ie MS vs. NMO vs. paraneoplastic) vs metastasis given MRI c/t-spine 5/5 revealing "enhancing left cord lesion at C4-C5 and Multiple additional nonenhancing lesions throughout the spinal cord." MRI head 5/3: Increased signal intensity along the dorsal medulla without enhancement is nonspecific in appearance. Increasing number of foci of elevated signal intensity within the subcortical and periventricular white matter. MRI L-spine unremarkable; AFP, hCG wnl   - Neuro to do LP today: to test oligoclonal bands, anti-aqp4, anti-MOG, paraneoplastic panel (anti-hu, yo, ri, tr, CV2/CRMP5, Ma1, Ma2, VGCC, amphiphysin, PCA-2, KLHL-11, recoverin), cytopathology, flow cytometry, viral encephalitis panel, bacterial cx  - Start IV solumedrol 1g x 5 days (5/6- )  - to order serum anti-aquaporin-4 (AQP4) IgG autoantibodies (associated with NMOSD) and anti-MOG IgG autoantibodies  - OT/PT following  - appreciate neuro recs    3. 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 Hx of hemochromatosis w/ phlebotomy management  - MRI liver pending  - monitor ferritin w/ blood transfusions  - recs as per heme/onc    6. Leukopenia: consider 2/2 MDS/ bone malignancy; infection appears less likely given afebrile, vitals stable, no source  - CBC daily    7. Thrombocytopenia: consider 2/2 to new PPI vs. bone malignancy -- stable 110s  - PPI downtitrated from BID to qd 5/3  - CBC daily    8. Tongue fasciculation consider 2/2 to demyelinating process as above vs prior brain germinoma vs new malignancy vs SMA vs. benign finding  - f/u neuro recs  - f/u brain mri    9. Nystagmus: old finding per patient, perhaps 2/2 to prior germinoma vs chronic demyelinating process  - f/u neuro recs  - f/u brain mri    10. DVT: SCD (given possible GI bleed, profound anemia)    11. FENGI: regular diet   1. Iron deficiency anemia (MCV 50, ferritin 2) with inappropriate bone marrow response (retic 0.7%): consider 2/2 myelodysplastic syndrome (2/2 chemoradiation hx) +/- acquired alpha thalassemia/ hgb H (occurs in <10% of MDS) vs. blood loss (ie GI malignancy) vs. 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 New L leg weakness: likely 2/2 chronic demyelinating process (ie MS vs. NMO vs. paraneoplastic) vs metastasis given MRI c/t-spine 5/5 revealing "enhancing left cord lesion at C4-C5 and Multiple additional nonenhancing lesions throughout the spinal cord." MRI head 5/3: Increased signal intensity along the dorsal medulla without enhancement is nonspecific in appearance. Increasing number of foci of elevated signal intensity within the subcortical and periventricular white matter. MRI L-spine unremarkable; AFP, hCG wnl   - Neuro to do LP today: to test oligoclonal bands, anti-aqp4, anti-MOG, paraneoplastic panel (anti-hu, yo, ri, tr, CV2/CRMP5, Ma1, Ma2, VGCC, amphiphysin, PCA-2, KLHL-11, recoverin), cytopathology, flow cytometry, viral encephalitis panel, bacterial cx  - Start IV solumedrol 1g x 5 days (5/6- )  - to order serum anti-aquaporin-4 (AQP4) IgG autoantibodies (associated with NMOSD), anti-MOG IgG autoantibodies, vitamin E  - OT/PT following  - appreciate neuro recs    3. 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 Hx of hemochromatosis w/ phlebotomy management  - MRI liver pending  - monitor ferritin w/ blood transfusions  - recs as per heme/onc    6. Leukopenia: consider 2/2 MDS/ bone malignancy; infection appears less likely given afebrile, vitals stable, no source  - CBC daily    7. Thrombocytopenia: consider 2/2 to new PPI vs. bone malignancy -- stable 110s  - PPI downtitrated from BID to qd 5/3  - CBC daily    8. Tongue fasciculation consider 2/2 to demyelinating process as above vs prior brain germinoma vs new malignancy vs SMA vs. benign finding  - f/u neuro recs  - f/u brain mri    9. Nystagmus: old finding per patient, perhaps 2/2 to prior germinoma vs chronic demyelinating process  - f/u neuro recs  - f/u brain mri    10. DVT: SCD (given possible GI bleed, profound anemia)    11. FENGI: regular diet

## 2021-05-06 NOTE — CHART NOTE - NSCHARTNOTEFT_GEN_A_CORE
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     MR l spine negative  MR brain demonstrates numerous SWI microhemorrhages of unclear etiology; Non specific T2 hyperintensity throughout the dorsal medulla and throughout the subcortical structures.     Impression: The LLE weakness remain of unclear etiology; The T2 flair abnormalities are also of unclear etiology, but possibly inflammatory.     New small inferior left frontal meningioma.    Plan:   [] No neurological contraindication to undergo GI endoscopy tomorrow  [] Patient will be reevaluated tomorrow
I saw the patient at bedside accompanied by his mother this afternoon after endoscopy procedure. I explained his imaging findings in detail and informed them of his working diagnosis of CNS inflammatory disease, possibly Multiple Sclerosis.     Patient elected to hold off of LP untill tomorrow AM as he is tired from the endoscopy.     Given the contrast enhancement in the cervical cord indicative of active inflammation, patient should be treated with pulse steroids today. Although steroids can potentially confound the CSF results, the CSF studies are not needed for the diagnosis but rather just a confirmatory test.     I recommend starting steroids this evening if possible. LP will be performed in the AM.     Rec:   1g IV solumedrol for 5 days.  AM coags  Continue t hold all pharmacological DVT ppx      This was discussed with the patient and family and are in agreement with the plan.
Per heme onc Dr. Ortiz outpt, hb was 11.4 and MCV 65 jan 2019.
Spoke w/ outpatient heme/onc, Dr. Ortiz's office. Last CBC from 1/2019 showed hgb 11.4, MCV 65.
Spoke with Dr. Reno Whitehead's office nurse ([phone]; heme/onc). Patient last seen by Dr. Ortiz 1/2019 for microcytic anemia (thought to be 2/2 to hx of thalassemia) and has been followed since 2012 by Dr. Ortiz for Brain Germinoma and hemochromatosis. Per Dr. Ortiz's office:    1. Hemochromatosis was diagnosed in 2012 with elevated ferritin w/ MRI showing iron deposition in the liver. Hemochromatosis plan was phlebotomy for ferritin >70; although patient has not needed phlebotomy since at least 2017.     2. Germinoma was diagnosed by a prior oncologist in [place] (doesn't know name, contact, or chemo regimen).
MR Thoracic and Cervical Spine w/wo IV Cont were done and demonstrated: enhancing left cord lesion at C4-C5 most compatible with acute demyelination. Less likely consideration is metastasis in this patient with history of germinoma. Multiple additional nonenhancing lesions throughout the spinal cord, most compatible with chronic demyelination. Recommend 1g Solumedrol IV X 5days and LP. Will coordinate LP tomorrow as patient is off unit.

## 2021-05-06 NOTE — MEDICAL STUDENT PROGRESS NOTE(EDUCATION) - SUBJECTIVE AND OBJECTIVE BOX
PROGRESS NOTE:   ************************************************  Authored by: Cedric Etienne, MS3  *************************************************    40y Male w/ hx of brain germinoma s/p chemo and radiation (), hemochromatosis requiring phlebotomies, and thalassemia presenting with L leg weakness/ worsening foot drop and microcytic anemia (hgb 6.1 at admission).      INTERVAL/OVERNIGHT EVENTS:   - No acute events overnight.   - Patient says he is feeling well. He says his left leg weakness persists, perhaps slightly better than at admission. He denies any new symptoms, including N/V, chest pain, shortness of breath, overt bleeding, hematochezia, or hematuria.     [x] History per: patient    [x] There are no updates to the medical, surgical, social or family history unless described:    Review of Systems: History Per:   General: [ ] Neg  Pulmonary: [ ] Neg  Cardiac: [ ] Neg  Gastrointestinal: [ ] Neg  Ears, Nose, Throat: [ ] Neg  Renal/Urologic: [ ] Neg  Musculoskeletal: [ ] Neg  Endocrine: [ ] Neg  Hematologic: [ ] Neg  Neurologic: [ ] Neg  Allergy/Immunologic: [ ] Neg  All other systems reviewed and negative [x]     MEDICATIONS  (STANDING):  lactated ringers. 500 milliLiter(s) (50 mL/Hr) IV Continuous <Continuous>  pantoprazole  Injectable 40 milliGRAM(s) IV Push daily    MEDICATIONS  (PRN):  acetaminophen   Tablet .. 650 milliGRAM(s) Oral every 6 hours PRN Mild Pain (1 - 3)  polyethylene glycol 3350 17 Gram(s) Oral daily PRN Constipation        Allergies    No Known Allergies    Intolerances      DIET:     PHYSICAL EXAM  Vital Signs Last 24 Hrs  T(C): 36.4 (06 May 2021 07:55), Max: 36.7 (06 May 2021 00:36)  T(F): 97.5 (06 May 2021 07:55), Max: 98 (06 May 2021 00:36)  HR: 66 (06 May 2021 07:55) (59 - 67)  BP: 118/78 (06 May 2021 07:55) (106/71 - 118/78)  BP(mean): --  RR: 18 (06 May 2021 07:55) (18 - 18)  SpO2: 99% (06 May 2021 07:55) (97% - 100%)    PATIENT CARE ACCESS DEVICES  [x] Peripheral IV  [ ] Central Venous Line, Date Placed:		Site/Device:  [ ] PICC, Date Placed:  [ ] Urinary Catheter, Date Placed:  [ ] Necessity of urinary, arterial, and venous catheters discussed    I&O's Summary    03 May 2021 07:01  -  04 May 2021 07:00  --------------------------------------------------------  IN: 1680 mL / OUT: 675 mL / NET: 1005 mL        Daily   BMI (kg/m2): 21.5 (02 @ 11:08)    VS reviewed, stable.  Gen: patient is alert, smiling, interactive, well appearing, no acute distress including pain  HEENT: NC/AT, pupils equal, responsive, reactive to light and accomodation, +non-fatiguable nystagmus on horizontal end gaze (pt has always had trouble "focusing" on objects); visual fields full; no conjunctivitis or scleral icterus; no nasal discharge or congestion. OP without exudates/erythema; +tongue fasciculations  Neck: FROM, supple, no cervical LAD  Chest: CTA b/l, no crackles/wheezes, good air entry, no tachypnea or retractions  CV: regular rate and rhythm, no murmurs   Abd: soft, nontender, nondistended, no HSM appreciated, +BS  : deferred  Extrem: No joint effusion or tenderness; FROM of all joints; no deformities or erythema noted. 2+ peripheral pulses, WWP.   Neuro: CN II-XII intact. Strength in B/L UEs 5/5; Strength in RLE 5/5; Left dorsi/plantar flexion 4/5-- other LLE 5/5 strength; sensation intact and equal in b/l LEs and b/l UEs. High stepping gate with left foot; L patellar, achilles 3+, R pat/ach 2+; +babinski on left    INTERVAL LAB RESULTS:                         7.6    3.08  )-----------( 113      ( 06 May 2021 07:13 )             27.9                        8.6    3.55  )-----------( 119      ( 05 May 2021 07:27 )             30.9                        8.1    4.47  )-----------( 109      ( 04 May 2021 07:21 )             29.3                         8.1    4.40  )-----------( 113      ( 03 May 2021 16:55 )             29.5                         6.7    3.72  )-----------( 104      ( 03 May 2021 07:45 )             24.3   05-06    142  |  103  |  10  ----------------------------<  83  4.3   |  24  |  1.10    Ca    9.7      06 May 2021 07:19  Phos  3.4     05-06  Mg     2.2     05-06    TPro  7.4  /  Alb  4.7  /  TBili  1.5<H>  /  DBili  x   /  AST  16  /  ALT  14  /  AlkPhos  57  05-06    05-05    141  |  104  |  11  ----------------------------<  93  4.0   |  22  |  1.02    Ca    9.7      05 May 2021 07:22  Phos  2.7     05-05  Mg     2.2     05-05    TPro  7.4  /  Alb  5.0  /  TBili  1.4<H>  /  DBili  x   /  AST  20  /  ALT  18  /  AlkPhos  60  05-05                              140    |  103    |  10                  Calcium: 9.6   / iCa: x      ( @ 07:22)    ----------------------------<  80        Magnesium: 2.3                              3.8     |  22     |  0.94             Phosphorous: 3.4      TPro  7.3    /  Alb  4.5    /  TBili  1.4    /  DBili  x      /  AST  17     /  ALT  17     /  AlkPhos  53     04 May 2021 07:22    Urinalysis Basic - ( 02 May 2021 18:32 )    Color: Light Yellow / Appearance: Clear / S.013 / pH: x  Gluc: x / Ketone: Negative  / Bili: Negative / Urobili: Negative   Blood: x / Protein: Negative / Nitrite: Negative   Leuk Esterase: Negative / RBC: 1 /hpf / WBC 0 /HPF   Sq Epi: x / Non Sq Epi: 0 /hpf / Bacteria: Negative          INTERVAL IMAGING STUDIES:  < from: CT Abdomen and Pelvis w/ IV Cont (21 @ 15:52) >  IMPRESSION:  No evidence of malignancy.  Moderate splenomegaly.    < end of copied text >  < from: CT Head No Cont (21 @ 12:03) >  IMPRESSION:Mild sulcal prominence suggesting mild volume loss which is nonspecific but may be related to chronic illness or certain medications. No masses. No change since 3/29/2012.    < end of copied text >  < from: MR Head w/wo IV Cont (21 @ 00:18) >  IMPRESSION:    Brain MRI: Unchanged encephalomalacia and gliosis within the genuine of the corpus callosum.    Increased signal intensity along the dorsal medulla without enhancement is nonspecific in appearance. Increasing number of foci of elevated signal intensity within the subcortical and periventricular white matter. Differential diagnosis includes ischemic, infectious/inflammatory and demyelinating processes. Cervical spine MRI with contrast may be valuable for further evaluation.    New small inferior left frontal meningioma.    Lumbar spine MRI: Unremarkable.    < end of copied text >   PROGRESS NOTE:   ************************************************  Authored by: Cedric Etienne, MS3  *************************************************    40y Male w/ hx of brain germinoma s/p chemo and radiation (), hemochromatosis requiring phlebotomies, and thalassemia presenting with L leg weakness/ worsening foot drop and microcytic anemia (hgb 6.1 at admission).      INTERVAL/OVERNIGHT EVENTS:   - No acute events overnight.   - Patient says he is feeling well. He says his left leg weakness persists, perhaps slightly better than at admission. He denies any new symptoms, including N/V, chest pain, shortness of breath, overt bleeding, hematochezia, or hematuria.     [x] History per: patient    [x] There are no updates to the medical, surgical, social or family history unless described:    Review of Systems: History Per:   General: [ ] Neg  Pulmonary: [ ] Neg  Cardiac: [ ] Neg  Gastrointestinal: [ ] Neg  Ears, Nose, Throat: [ ] Neg  Renal/Urologic: [ ] Neg  Musculoskeletal: [ ] Neg  Endocrine: [ ] Neg  Hematologic: [ ] Neg  Neurologic: [ ] Neg  Allergy/Immunologic: [ ] Neg  All other systems reviewed and negative [x]     MEDICATIONS  (STANDING):  lactated ringers. 500 milliLiter(s) (50 mL/Hr) IV Continuous <Continuous>  pantoprazole  Injectable 40 milliGRAM(s) IV Push daily    MEDICATIONS  (PRN):  acetaminophen   Tablet .. 650 milliGRAM(s) Oral every 6 hours PRN Mild Pain (1 - 3)  polyethylene glycol 3350 17 Gram(s) Oral daily PRN Constipation        Allergies    No Known Allergies    Intolerances      DIET:     PHYSICAL EXAM  Vital Signs Last 24 Hrs  T(C): 36.4 (06 May 2021 07:55), Max: 36.7 (06 May 2021 00:36)  T(F): 97.5 (06 May 2021 07:55), Max: 98 (06 May 2021 00:36)  HR: 66 (06 May 2021 07:55) (59 - 67)  BP: 118/78 (06 May 2021 07:55) (106/71 - 118/78)  BP(mean): --  RR: 18 (06 May 2021 07:55) (18 - 18)  SpO2: 99% (06 May 2021 07:55) (97% - 100%)    PATIENT CARE ACCESS DEVICES  [x] Peripheral IV  [ ] Central Venous Line, Date Placed:		Site/Device:  [ ] PICC, Date Placed:  [ ] Urinary Catheter, Date Placed:  [ ] Necessity of urinary, arterial, and venous catheters discussed    I&O's Summary    03 May 2021 07:01  -  04 May 2021 07:00  --------------------------------------------------------  IN: 1680 mL / OUT: 675 mL / NET: 1005 mL        Daily   BMI (kg/m2): 21.5 (02 @ 11:08)    VS reviewed, stable.  Gen: patient is alert, smiling, interactive, well appearing, no acute distress including pain  HEENT: NC/AT, pupils equal, responsive, reactive to light and accomodation, +non-fatiguable nystagmus on horizontal end gaze (pt has always had trouble "focusing" on objects); visual fields full; no conjunctivitis or scleral icterus; no nasal discharge or congestion. OP without exudates/erythema; +tongue fasciculations  Neck: FROM, supple, no cervical LAD  Chest: CTA b/l, no crackles/wheezes, good air entry, no tachypnea or retractions  CV: regular rate and rhythm, no murmurs   Abd: soft, nontender, nondistended, no HSM appreciated, +BS  : deferred  Extrem: No joint effusion or tenderness; FROM of all joints; no deformities or erythema noted. 2+ peripheral pulses, WWP.   Neuro: CN II-XII intact. Strength in B/L UEs 5/5; Strength in RLE 5/5; Left dorsi/plantar flexion 4/5-- other LLE 5/5 strength; sensation intact and equal in b/l LEs and b/l UEs. High stepping gate with left foot; L patellar, achilles 3+, R pat/ach 2+; +babinski on left    INTERVAL LAB RESULTS:                         7.6    3.08  )-----------( 113      ( 06 May 2021 07:13 )             27.9                        8.6    3.55  )-----------( 119      ( 05 May 2021 07:27 )             30.9                        8.1    4.47  )-----------( 109      ( 04 May 2021 07:21 )             29.3                         8.1    4.40  )-----------( 113      ( 03 May 2021 16:55 )             29.5                         6.7    3.72  )-----------( 104      ( 03 May 2021 07:45 )             24.3   05-06    142  |  103  |  10  ----------------------------<  83  4.3   |  24  |  1.10    Ca    9.7      06 May 2021 07:19  Phos  3.4     05-06  Mg     2.2     05-06    TPro  7.4  /  Alb  4.7  /  TBili  1.5<H>  /  DBili  x   /  AST  16  /  ALT  14  /  AlkPhos  57  05-06    05-05    141  |  104  |  11  ----------------------------<  93  4.0   |  22  |  1.02    Ca    9.7      05 May 2021 07:22  Phos  2.7     05-05  Mg     2.2     05-05    TPro  7.4  /  Alb  5.0  /  TBili  1.4<H>  /  DBili  x   /  AST  20  /  ALT  18  /  AlkPhos  60  05-05                              140    |  103    |  10                  Calcium: 9.6   / iCa: x      ( @ 07:22)    ----------------------------<  80        Magnesium: 2.3                              3.8     |  22     |  0.94             Phosphorous: 3.4      TPro  7.3    /  Alb  4.5    /  TBili  1.4    /  DBili  x      /  AST  17     /  ALT  17     /  AlkPhos  53     04 May 2021 07:22    Urinalysis Basic - ( 02 May 2021 18:32 )    Color: Light Yellow / Appearance: Clear / S.013 / pH: x  Gluc: x / Ketone: Negative  / Bili: Negative / Urobili: Negative   Blood: x / Protein: Negative / Nitrite: Negative   Leuk Esterase: Negative / RBC: 1 /hpf / WBC 0 /HPF   Sq Epi: x / Non Sq Epi: 0 /hpf / Bacteria: Negative          INTERVAL IMAGING STUDIES:  < from: CT Abdomen and Pelvis w/ IV Cont (21 @ 15:52) >  IMPRESSION:  No evidence of malignancy.  Moderate splenomegaly.    < end of copied text >  < from: CT Head No Cont (21 @ 12:03) >  IMPRESSION:Mild sulcal prominence suggesting mild volume loss which is nonspecific but may be related to chronic illness or certain medications. No masses. No change since 3/29/2012.    < end of copied text >  < from: MR Head w/wo IV Cont (21 @ 00:18) >  IMPRESSION:    Brain MRI: Unchanged encephalomalacia and gliosis within the genuine of the corpus callosum.    Increased signal intensity along the dorsal medulla without enhancement is nonspecific in appearance. Increasing number of foci of elevated signal intensity within the subcortical and periventricular white matter. Differential diagnosis includes ischemic, infectious/inflammatory and demyelinating processes. Cervical spine MRI with contrast may be valuable for further evaluation.    New small inferior left frontal meningioma.    Lumbar spine MRI: Unremarkable.    < end of copied text >  < from: MR Cervical Spine w/wo IV Cont (21 @ 22:09) >  IMPRESSION:    -Enhancing left cord lesion at C4-C5 most compatible with acute demyelination. Less likely consideration is metastasis in this patient with history of germinoma. Multiple additional nonenhancing lesions throughout the spinal cord, most compatible with chronic demyelination. Correlate with symptomatologyand CSF analysis.    -2.0 cm right thyroid nodule. Recommend further evaluation by ultrasound.    < end of copied text >   PROGRESS NOTE:   ************************************************  Authored by: Cedric Etienne, MS3  *************************************************    40y Male w/ hx of brain germinoma s/p chemo and radiation (), hemochromatosis requiring phlebotomies, and thalassemia presenting with L leg weakness/ worsening foot drop and microcytic anemia (hgb 6.1 at admission).      INTERVAL/OVERNIGHT EVENTS:   - No acute events overnight.   - Patient says he is feeling well. He says his left leg weakness persists, perhaps slightly better than at admission. He denies any new symptoms, including N/V, chest pain, shortness of breath, overt bleeding, hematochezia, or hematuria.     [x] History per: patient    [x] There are no updates to the medical, surgical, social or family history unless described:    Review of Systems: History Per:   General: [ ] Neg  Pulmonary: [ ] Neg  Cardiac: [ ] Neg  Gastrointestinal: [ ] Neg  Ears, Nose, Throat: [ ] Neg  Renal/Urologic: [ ] Neg  Musculoskeletal: [ ] Neg  Endocrine: [ ] Neg  Hematologic: [ ] Neg  Neurologic: [ ] Neg  Allergy/Immunologic: [ ] Neg  All other systems reviewed and negative [x]     MEDICATIONS  (STANDING):  lactated ringers. 500 milliLiter(s) (50 mL/Hr) IV Continuous <Continuous>  pantoprazole  Injectable 40 milliGRAM(s) IV Push daily    MEDICATIONS  (PRN):  acetaminophen   Tablet .. 650 milliGRAM(s) Oral every 6 hours PRN Mild Pain (1 - 3)  polyethylene glycol 3350 17 Gram(s) Oral daily PRN Constipation        Allergies    No Known Allergies    Intolerances      DIET:     PHYSICAL EXAM  Vital Signs Last 24 Hrs  T(C): 36.4 (06 May 2021 07:55), Max: 36.7 (06 May 2021 00:36)  T(F): 97.5 (06 May 2021 07:55), Max: 98 (06 May 2021 00:36)  HR: 66 (06 May 2021 07:55) (59 - 67)  BP: 118/78 (06 May 2021 07:55) (106/71 - 118/78)  BP(mean): --  RR: 18 (06 May 2021 07:55) (18 - 18)  SpO2: 99% (06 May 2021 07:55) (97% - 100%)    PATIENT CARE ACCESS DEVICES  [x] Peripheral IV  [ ] Central Venous Line, Date Placed:		Site/Device:  [ ] PICC, Date Placed:  [ ] Urinary Catheter, Date Placed:  [ ] Necessity of urinary, arterial, and venous catheters discussed    I&O's Summary    03 May 2021 07:01  -  04 May 2021 07:00  --------------------------------------------------------  IN: 1680 mL / OUT: 675 mL / NET: 1005 mL        Daily   BMI (kg/m2): 21.5 (02 @ 11:08)    VS reviewed, stable.  Gen: patient is alert, smiling, interactive, well appearing, no acute distress including pain  HEENT: NC/AT, pupils equal, responsive, reactive to light and accomodation, +non-fatiguable nystagmus on horizontal end gaze (pt has always had trouble "focusing" on objects); visual fields full; no conjunctivitis or scleral icterus; no nasal discharge or congestion. OP without exudates/erythema; +tongue fasciculations  Neck: FROM, supple, no cervical LAD  Chest: CTA b/l, no crackles/wheezes, good air entry, no tachypnea or retractions  CV: regular rate and rhythm, no murmurs   Abd: soft, nontender, nondistended, no HSM appreciated, +BS  : deferred  Extrem: No joint effusion or tenderness; FROM of all joints; no deformities or erythema noted. 2+ peripheral pulses, WWP.   Neuro: CN II-XII intact. Strength in B/L UEs 5/5; Strength in RLE 5/5; Left dorsi/plantar flexion 4/5-- other LLE 5/5 strength; sensation intact and equal in b/l LEs and b/l UEs. High stepping gate with left foot; 3+ L bi/tri/brach/pat/ach, 2+ R bi/tri/brach/pat/ach; +babinski on left    INTERVAL LAB RESULTS:                         7.6    3.08  )-----------( 113      ( 06 May 2021 07:13 )             27.9                        8.6    3.55  )-----------( 119      ( 05 May 2021 07:27 )             30.9                        8.1    4.47  )-----------( 109      ( 04 May 2021 07:21 )             29.3                         8.1    4.40  )-----------( 113      ( 03 May 2021 16:55 )             29.5                         6.7    3.72  )-----------( 104      ( 03 May 2021 07:45 )             24.3   05-06    142  |  103  |  10  ----------------------------<  83  4.3   |  24  |  1.10    Ca    9.7      06 May 2021 07:19  Phos  3.4     05-06  Mg     2.2     05-06    TPro  7.4  /  Alb  4.7  /  TBili  1.5<H>  /  DBili  x   /  AST  16  /  ALT  14  /  AlkPhos  57  05-06    05-05    141  |  104  |  11  ----------------------------<  93  4.0   |  22  |  1.02    Ca    9.7      05 May 2021 07:22  Phos  2.7     05-05  Mg     2.2     05-05    TPro  7.4  /  Alb  5.0  /  TBili  1.4<H>  /  DBili  x   /  AST  20  /  ALT  18  /  AlkPhos  60  05-05                              140    |  103    |  10                  Calcium: 9.6   / iCa: x      (04 @ 07:22)    ----------------------------<  80        Magnesium: 2.3                              3.8     |  22     |  0.94             Phosphorous: 3.4      TPro  7.3    /  Alb  4.5    /  TBili  1.4    /  DBili  x      /  AST  17     /  ALT  17     /  AlkPhos  53     04 May 2021 07:22    Urinalysis Basic - ( 02 May 2021 18:32 )    Color: Light Yellow / Appearance: Clear / S.013 / pH: x  Gluc: x / Ketone: Negative  / Bili: Negative / Urobili: Negative   Blood: x / Protein: Negative / Nitrite: Negative   Leuk Esterase: Negative / RBC: 1 /hpf / WBC 0 /HPF   Sq Epi: x / Non Sq Epi: 0 /hpf / Bacteria: Negative          INTERVAL IMAGING STUDIES:  < from: CT Abdomen and Pelvis w/ IV Cont (21 @ 15:52) >  IMPRESSION:  No evidence of malignancy.  Moderate splenomegaly.    < end of copied text >  < from: CT Head No Cont (21 @ 12:03) >  IMPRESSION:Mild sulcal prominence suggesting mild volume loss which is nonspecific but may be related to chronic illness or certain medications. No masses. No change since 3/29/2012.    < end of copied text >  < from: MR Head w/wo IV Cont (21 @ 00:18) >  IMPRESSION:    Brain MRI: Unchanged encephalomalacia and gliosis within the genuine of the corpus callosum.    Increased signal intensity along the dorsal medulla without enhancement is nonspecific in appearance. Increasing number of foci of elevated signal intensity within the subcortical and periventricular white matter. Differential diagnosis includes ischemic, infectious/inflammatory and demyelinating processes. Cervical spine MRI with contrast may be valuable for further evaluation.    New small inferior left frontal meningioma.    Lumbar spine MRI: Unremarkable.    < end of copied text >  < from: MR Cervical Spine w/wo IV Cont (21 @ 22:09) >  IMPRESSION:    -Enhancing left cord lesion at C4-C5 most compatible with acute demyelination. Less likely consideration is metastasis in this patient with history of germinoma. Multiple additional nonenhancing lesions throughout the spinal cord, most compatible with chronic demyelination. Correlate with symptomatologyand CSF analysis.    -2.0 cm right thyroid nodule. Recommend further evaluation by ultrasound.    < end of copied text >

## 2021-05-06 NOTE — PROGRESS NOTE ADULT - PROBLEM SELECTOR PLAN 4
- hx germinoma (brain, specific location need to obtain records from [facility]) dx in 2000 s/p chemo/radiation in [id number]. dx when he presented with L foot drop/weakness.   - used to see neuro at [facility], lost to follow up for last few years (pt says because of "pandemic")  plan  - MRI brain as above - f/u neuro recs  - heme onc on board - f/u additional recs - hx germinoma (brain, specific location need to obtain records from [facility]) dx in 2000 s/p chemo/radiation in [id number]. dx when he presented with L foot drop/weakness.   - used to see neuro at [facility], lost to follow up for last few years (pt says because of "pandemic")  plan  - no recurrence on MRI

## 2021-05-06 NOTE — PROGRESS NOTE ADULT - SUBJECTIVE AND OBJECTIVE BOX
Precious Bermanathy PGY1    Patient is a 40y old  Male who presents with a chief complaint of Anemia (04 May 2021 12:16)      SUBJECTIVE / OVERNIGHT EVENTS:    MEDICATIONS  (STANDING):  lactated ringers. 500 milliLiter(s) (50 mL/Hr) IV Continuous <Continuous>  pantoprazole  Injectable 40 milliGRAM(s) IV Push daily    MEDICATIONS  (PRN):  acetaminophen   Tablet .. 650 milliGRAM(s) Oral every 6 hours PRN Mild Pain (1 - 3)  polyethylene glycol 3350 17 Gram(s) Oral daily PRN Constipation      CAPILLARY BLOOD GLUCOSE        I&O's Summary    05 May 2021 07:01  -  06 May 2021 07:00  --------------------------------------------------------  IN: 1170 mL / OUT: 0 mL / NET: 1170 mL        Vital Signs Last 24 Hrs  T(C): 36.4 (06 May 2021 06:26), Max: 36.7 (06 May 2021 00:36)  T(F): 97.5 (06 May 2021 06:26), Max: 98 (06 May 2021 00:36)  HR: 66 (06 May 2021 06:26) (59 - 70)  BP: 118/78 (06 May 2021 06:26) (105/70 - 118/78)  BP(mean): --  RR: 18 (06 May 2021 06:26) (18 - 18)  SpO2: 99% (06 May 2021 06:26) (97% - 100%)    PHYSICAL EXAM:  GENERAL: lying down, appears comfortable on RA  HEAD:  Atraumatic, Normocephalic  EYES: extra ocular movements intact, horizontal nystagmus on lateral gaze  ENT: Moist mucous membranes  NECK: Supple  CHEST/LUNG: clear to auscultation bilaterally, no crackles/wheeze/ronchi  HEART: regular rate and rhythm, no murmurs  ABDOMEN: nontender abdomen, no masses palpated. no rebound/guarding.   EXTREMITIES:  no edema. able to move all extremities. no rashes.   NERVOUS SYSTEM: A&O x3, CN2-12 - horizontal nystagmus with lateral gaze bilaterally but EOMI , fasciculations in tongue +, otherwise CN2-12 wnl. 5/5  str bilateral UE. no dysmetria, no dyskinesia. no tremors at rest or action. no pronator drift, neg romberg.   - GAIT: prominent L foot drop, dragging L foot with each step.   MSK: FROM all 4 extremities, full and equal strength  SKIN: No rashes or lesions    LABS:                        8.6    3.55  )-----------( 119      ( 05 May 2021 07:27 )             30.9      05-05    141  |  104  |  11  ----------------------------<  93  4.0   |  22  |  1.02    Ca    9.7      05 May 2021 07:22  Phos  2.7     05-05  Mg     2.2     05-05    TPro  7.4  /  Alb  5.0  /  TBili  1.4<H>  /  DBili  x   /  AST  20  /  ALT  18  /  AlkPhos  60  05-05    PT/INR - ( 05 May 2021 07:27 )   PT: 13.5 sec;   INR: 1.13 ratio         PTT - ( 05 May 2021 07:27 )  PTT:43.2 sec  CARDIAC MARKERS ( 05 May 2021 07:22 )  x     / x     / 41 U/L / x     / x              RADIOLOGY & ADDITIONAL TESTS:    Imaging Personally Reviewed:    Consultant(s) Notes Reviewed:      Care Discussed with Consultants/Other Providers:   Precious Reena PGY1    Patient is a 40y old  Male who presents with a chief complaint of Anemia (04 May 2021 12:16)      SUBJECTIVE / OVERNIGHT EVENTS:\  - overnight - no events  - am - pt finished bowel prep, no abdominal pain, no melena/hematoschezia, no new weakness, no improvement of LLE weakness.     MEDICATIONS  (STANDING):  lactated ringers. 500 milliLiter(s) (50 mL/Hr) IV Continuous <Continuous>  pantoprazole  Injectable 40 milliGRAM(s) IV Push daily    MEDICATIONS  (PRN):  acetaminophen   Tablet .. 650 milliGRAM(s) Oral every 6 hours PRN Mild Pain (1 - 3)  polyethylene glycol 3350 17 Gram(s) Oral daily PRN Constipation      CAPILLARY BLOOD GLUCOSE        I&O's Summary    05 May 2021 07:01  -  06 May 2021 07:00  --------------------------------------------------------  IN: 1170 mL / OUT: 0 mL / NET: 1170 mL        Vital Signs Last 24 Hrs  T(C): 36.4 (06 May 2021 06:26), Max: 36.7 (06 May 2021 00:36)  T(F): 97.5 (06 May 2021 06:26), Max: 98 (06 May 2021 00:36)  HR: 66 (06 May 2021 06:26) (59 - 70)  BP: 118/78 (06 May 2021 06:26) (105/70 - 118/78)  BP(mean): --  RR: 18 (06 May 2021 06:26) (18 - 18)  SpO2: 99% (06 May 2021 06:26) (97% - 100%)    PHYSICAL EXAM:  GENERAL: lying down, appears comfortable on RA  HEAD:  Atraumatic, Normocephalic  EYES: extra ocular movements intact, horizontal nystagmus on lateral gaze  ENT: Moist mucous membranes  NECK: Supple  CHEST/LUNG: clear to auscultation bilaterally, no crackles/wheeze/ronchi  HEART: regular rate and rhythm, no murmurs  ABDOMEN: nontender abdomen, no masses palpated. no rebound/guarding.   EXTREMITIES:  no edema. able to move all extremities. no rashes.   NERVOUS SYSTEM: A&O x3, CN2-12 - horizontal nystagmus with lateral gaze bilaterally but EOMI , fasciculations in tongue +, otherwise CN2-12 wnl. 5/5  str bilateral UE. no dysmetria, no dyskinesia. no tremors at rest or action. no pronator drift, neg romberg.   - GAIT: prominent L foot drop, dragging L foot with each step.   MSK: FROM all 4 extremities, full and equal strength  SKIN: No rashes or lesions    LABS:                        8.6    3.55  )-----------( 119      ( 05 May 2021 07:27 )             30.9      05-05    141  |  104  |  11  ----------------------------<  93  4.0   |  22  |  1.02    Ca    9.7      05 May 2021 07:22  Phos  2.7     05-05  Mg     2.2     05-05    TPro  7.4  /  Alb  5.0  /  TBili  1.4<H>  /  DBili  x   /  AST  20  /  ALT  18  /  AlkPhos  60  05-05    PT/INR - ( 05 May 2021 07:27 )   PT: 13.5 sec;   INR: 1.13 ratio         PTT - ( 05 May 2021 07:27 )  PTT:43.2 sec  CARDIAC MARKERS ( 05 May 2021 07:22 )  x     / x     / 41 U/L / x     / x              RADIOLOGY & ADDITIONAL TESTS:    Imaging Personally Reviewed:    Consultant(s) Notes Reviewed:      Care Discussed with Consultants/Other Providers:

## 2021-05-06 NOTE — PRE-ANESTHESIA EVALUATION ADULT - WEIGHT IN LBS
149.9
Denies the following: constitutional symptoms, visual symptoms, cardiovascular symptoms, respiratory symptoms, GI symptoms, musculoskeletal symptoms, skin symptoms, neurologic symptoms, hematologic symptoms, allergic symptoms, psychiatric symptoms  Except any pertinent positives listed.

## 2021-05-06 NOTE — PROGRESS NOTE ADULT - PROBLEM SELECTOR PLAN 6
- PTT prolonged to 43 on admission (INR and PT wnl).   - etiology: not on heparin. not on anticoagulants. possible von willebrand, hemophilia. not concerning for DIC. less likely cirrhosis given INR wnl, thrombycytopenia since yesterday, albumin wnl.   plan  - f/u factor 8.   - liver imaging. f/u heme/onc recs - PTT prolonged to 43 on admission (INR and PT wnl). factor 8 deficiency.   - etiology: not on heparin. not on anticoagulants. possible von willebrand, hemophilia. not concerning for DIC. less likely cirrhosis given INR wnl, thrombycytopenia since yesterday, albumin wnl.   plan  - f/u heme/onc recs

## 2021-05-06 NOTE — PROGRESS NOTE ADULT - PROBLEM SELECTOR PLAN 1
- admitted with hb 6.1 (f/u baseline from PCP), MCV 50.7. RDW 22.9. smear with moderate hypochromia, target cells.  s/p 2U pRBC since admission.   - per pt, has thalassemia and hemochromatosis. [place] heritage. pt required blood transfusions during chemo. hx hemochromatosis requiring phlebotomy every month for few years (confirmed with PCP), last done ~3 years ago per pt.   - FH - mother with thalassemia but never needed blood transfusion.  - iron panel consistent with severe JAS: serum iron 13, TIBC 481, ferritin 2.  LDH and haptoglobin not concerning for hemolysis. retic % 0.7 which is low in setting of anemia with hb <7 suggesting bone marrow suppression. HIV neg.   - CT AP 5/5  with moderate splenomegaly (15.3cm), otherwise normal. DDX of splenomegaly - myeloproliferative vs thalassemia. no infectious sx though IgM elevated.   - celiac labs (IgM, anti-transglutaminase) - IgM elevated suggesting infection. anti transglutaminase neg  - etiology of present anemia: possible GI malignancy vs myelodysplastic syndrome vs thalassemia. no bleeding reported by pt (no melena/hematoschezia, no hemoptysis/hematemesis, no hematuria). However, pos FOBT and severe JAS w ferritin 2 concerning for possible GI malignancy. severe microcytic anemia concerning for thalassemia (pt reports he has hx but unclear) though anemia is new (no records available per PCP given pt has not followed up in few years). possible MDS given anemia, could have acquired thalassemia (hemoglobin H) causing anemia.   plan  - s/p 2U pRBC 5/2 and 5/3. started 1 day venofer 200 5/4 per heme onc recs.   - f/u heme/onc recs   - GI consulted for endoscopy/colonoscopy to evaluate for malignancy/GI bleed. pt did not complete bowel prep. f/u GI.   - f/u hemoglobin electropheresis - beta thalassemia minor (hb A 95.1, hb A2 elevated 4.9)  - f/u clotting factor lvls - factor 8 23, factor 9 73. von willebrand factor activity wnl  - f/u lead - wnl, zn - wnl, heavy metal poison - neg - admitted with hb 6.1 (f/u baseline from PCP), MCV 50.7. RDW 22.9. smear with moderate hypochromia, target cells.  s/p 2U pRBC since admission.   - per pt, has thalassemia and hemochromatosis. [place] heritage. pt required blood transfusions during chemo. hx hemochromatosis requiring phlebotomy every month for few years (confirmed with PCP), last done ~3 years ago per pt.   - FH - mother with thalassemia but never needed blood transfusion.  - iron panel consistent with severe JAS: serum iron 13, TIBC 481, ferritin 2.  LDH and haptoglobin not concerning for hemolysis. retic % 0.7 which is low in setting of anemia with hb <7 suggesting bone marrow suppression. HIV neg.   - CT AP 5/5  with moderate splenomegaly (15.3cm), otherwise normal. DDX of splenomegaly - myeloproliferative vs thalassemia. no infectious sx though IgM elevated.   - celiac labs (IgM, anti-transglutaminase) - IgM elevated suggesting infection. anti transglutaminase neg  - etiology of present anemia: possible GI malignancy vs myelodysplastic syndrome vs thalassemia. no bleeding reported by pt (no melena/hematoschezia, no hemoptysis/hematemesis, no hematuria). However, pos FOBT and severe JAS w ferritin 2 concerning for possible GI malignancy. severe microcytic anemia concerning for thalassemia (pt reports he has hx but unclear) though anemia is new (no records available per PCP given pt has not followed up in few years). possible MDS given anemia, could have acquired thalassemia (hemoglobin H) causing anemia.   plan  - s/p 2U pRBC 5/2 and 5/3. s/p 1 day venofer 200 5/4 per heme onc recs. venofer 200 5/6.   - f/u heme/onc recs   - GI consulted for endoscopy/colonoscopy to evaluate for malignancy/GI bleed - to be done today, f/u.   - f/u hemoglobin electropheresis - beta thalassemia minor (hb A 95.1, hb A2 elevated 4.9)  - f/u clotting factor lvls - factor 8 23, factor 9 73. von willebrand factor activity wnl  - f/u lead - wnl, zn - wnl, heavy metal poison - neg

## 2021-05-06 NOTE — PROGRESS NOTE ADULT - PROBLEM SELECTOR PLAN 2
- hb electropheresis w beta thalassemia minor (hb A 95.1, hb A2 elevated 4.9) - unlikely to explain anemia.   - clotting factor lvls - factor 8 23, factor 9 73. von Willebrand factor activity wnl - no hx bleeding per pt. unclear etiology, unlikely to explain anemia  - f/u heme onc recs

## 2021-05-06 NOTE — PROGRESS NOTE ADULT - PROBLEM SELECTOR PLAN 8
- horizontal nystagmus with lateral eye movements - pt states has had before, is not sure cause.   - etiology: cerebellum lesion though no dysmetria and no dyskinesia.    #tongue fasciculations  - fasciulation of tongue observed on exam  - etiology: benign, ALS

## 2021-05-06 NOTE — PROGRESS NOTE ADULT - ATTENDING COMMENTS
-MRI CT spine results reviewed. Concerning for demyelinating condition. -Neuro recs appreciated; plan for LP today and high dose steroids thereafter. -Would send CSF at least for cell count, glucose, protein, oligoclonal bands, cytopathology, flow cytometry, cultures, and viral panel.   -Neuro checks now Q4H.   -F/u EGD/colonoscopy results from today.  -Additional Venofer 200mg IV ordered for today.   -Will get MRI LS spine C+/C- to better eval for demyelinating lesions, given that prior one was without contrast.   -Consider MRI abdomen inpatient to eval for ?hemochromatosis. -Gene mutation lab was ordered and to be sent.   -Can get US thyroid for thyroid nodule on an outpatient basis, unless clinical course warrants inpatient. -TSH mildly elevated, but FT4 within normal.

## 2021-05-06 NOTE — PROGRESS NOTE ADULT - PROBLEM SELECTOR PLAN 7
leukopenia with wbc 3.11 on admission. diff wnl. today wnl.   - possible bone marrow suppression vs malignancy. afebrile, no infection sx including congestion/cough/fevers/chills/diarrhea. less likely infection.   plan  - CTM daily CBC  - f/u heme onc recs    #thrombocytopenia  - on admission plt 237 -> 119 today. stabilized.   - no heparin exposure. possible exposure to pantoprazole.   - discontinue IV pantoprazole BID -> reduced to PO 40mg QD.    #atrophic kidney  - likely 2/2 hx kidney stones requiring lithotripsy. CT AP this admission with non-obstructing right renal stone. Marked right renal scarring and atrophy. No hydronephrosis or mass. leukopenia with wbc 3.11 on admission. diff wnl. today wnl.   - possible bone marrow suppression vs malignancy. afebrile, no infection sx including congestion/cough/fevers/chills/diarrhea. less likely infection.   plan  - CTM daily CBC  - f/u heme onc recs    #thrombocytopenia  - on admission plt 237 -> 119 today. stabilized.   - no heparin exposure. possible exposure to pantoprazole.   - discontinue IV pantoprazole BID -> reduced to PO 40mg IV.     #atrophic kidney  - likely 2/2 hx kidney stones requiring lithotripsy. CT AP this admission with non-obstructing right renal stone. Marked right renal scarring and atrophy. No hydronephrosis or mass.

## 2021-05-06 NOTE — PROGRESS NOTE ADULT - PROBLEM SELECTOR PLAN 5
- hx hemochromatosis requiring monthly phlebotomies (confirmed with PCP Dr. Ponce) - last phlebotomy 3 months ago  plan  - currently not in iron overload in setting of anemia above. CTM ferritin with blood transfusions.  - f/u testosterone lvl  - f/u genetic testing, out-patient MRI of liver to evaluate for iron deposition - hx hemochromatosis requiring monthly phlebotomies (confirmed with PCP Dr. Ponce) - last phlebotomy 3 months ago  plan  - currently not in iron overload in setting of anemia above. CTM ferritin with blood transfusions.  - f/u genetic testing, out-patient MRI of liver to evaluate for iron deposition

## 2021-05-07 ENCOUNTER — RESULT REVIEW (OUTPATIENT)
Age: 41
End: 2021-05-07

## 2021-05-07 LAB
ALBUMIN SERPL ELPH-MCNC: 5 G/DL — SIGNIFICANT CHANGE UP (ref 3.3–5)
ALP SERPL-CCNC: 61 U/L — SIGNIFICANT CHANGE UP (ref 40–120)
ALT FLD-CCNC: 19 U/L — SIGNIFICANT CHANGE UP (ref 10–45)
ANION GAP SERPL CALC-SCNC: 15 MMOL/L — SIGNIFICANT CHANGE UP (ref 5–17)
ANISOCYTOSIS BLD QL: SIGNIFICANT CHANGE UP
APPEARANCE CSF: CLEAR — SIGNIFICANT CHANGE UP
AST SERPL-CCNC: 29 U/L — SIGNIFICANT CHANGE UP (ref 10–40)
BASOPHILS # BLD AUTO: 0 K/UL — SIGNIFICANT CHANGE UP (ref 0–0.2)
BASOPHILS NFR BLD AUTO: 0 % — SIGNIFICANT CHANGE UP (ref 0–2)
BILIRUB SERPL-MCNC: 1.5 MG/DL — HIGH (ref 0.2–1.2)
BUN SERPL-MCNC: 13 MG/DL — SIGNIFICANT CHANGE UP (ref 7–23)
CALCIUM SERPL-MCNC: 9.9 MG/DL — SIGNIFICANT CHANGE UP (ref 8.4–10.5)
CHLORIDE SERPL-SCNC: 103 MMOL/L — SIGNIFICANT CHANGE UP (ref 96–108)
CO2 SERPL-SCNC: 21 MMOL/L — LOW (ref 22–31)
COLOR CSF: SIGNIFICANT CHANGE UP
CREAT SERPL-MCNC: 1.09 MG/DL — SIGNIFICANT CHANGE UP (ref 0.5–1.3)
CREATININE, URINE RESULT: 111 MG/DL — SIGNIFICANT CHANGE UP
CSF PCR RESULT: SIGNIFICANT CHANGE UP
DACRYOCYTES BLD QL SMEAR: SLIGHT — SIGNIFICANT CHANGE UP
ELLIPTOCYTES BLD QL SMEAR: SLIGHT — SIGNIFICANT CHANGE UP
EOSINOPHIL # BLD AUTO: 0 K/UL — SIGNIFICANT CHANGE UP (ref 0–0.5)
EOSINOPHIL NFR BLD AUTO: 0 % — SIGNIFICANT CHANGE UP (ref 0–6)
GLUCOSE BLDC GLUCOMTR-MCNC: 139 MG/DL — HIGH (ref 70–99)
GLUCOSE BLDC GLUCOMTR-MCNC: 144 MG/DL — HIGH (ref 70–99)
GLUCOSE CSF-MCNC: 81 MG/DL — HIGH (ref 40–70)
GLUCOSE SERPL-MCNC: 167 MG/DL — HIGH (ref 70–99)
GRAM STN FLD: SIGNIFICANT CHANGE UP
HCT VFR BLD CALC: 30.9 % — LOW (ref 39–50)
HGB BLD-MCNC: 8.6 G/DL — LOW (ref 13–17)
HYPOCHROMIA BLD QL: SIGNIFICANT CHANGE UP
IMM GRANULOCYTES NFR BLD AUTO: 0.4 % — SIGNIFICANT CHANGE UP (ref 0–1.5)
LYMPHOCYTES # BLD AUTO: 0.18 K/UL — LOW (ref 1–3.3)
LYMPHOCYTES # BLD AUTO: 7.2 % — LOW (ref 13–44)
LYMPHOCYTES # CSF: 73 % — SIGNIFICANT CHANGE UP (ref 40–80)
MACROCYTES BLD QL: SLIGHT — SIGNIFICANT CHANGE UP
MAGNESIUM SERPL-MCNC: 2.2 MG/DL — SIGNIFICANT CHANGE UP (ref 1.6–2.6)
MANUAL SMEAR VERIFICATION: SIGNIFICANT CHANGE UP
MCHC RBC-ENTMCNC: 15.7 PG — LOW (ref 27–34)
MCHC RBC-ENTMCNC: 27.8 GM/DL — LOW (ref 32–36)
MCV RBC AUTO: 56.4 FL — LOW (ref 80–100)
MICROCYTES BLD QL: SIGNIFICANT CHANGE UP
MISCELLANEOUS TEST NAME: SIGNIFICANT CHANGE UP
MONOCYTES # BLD AUTO: 0.01 K/UL — SIGNIFICANT CHANGE UP (ref 0–0.9)
MONOCYTES NFR BLD AUTO: 0.4 % — LOW (ref 2–14)
MONOS+MACROS NFR CSF: 27 % — SIGNIFICANT CHANGE UP (ref 15–45)
NEUTROPHILS # BLD AUTO: 2.3 K/UL — SIGNIFICANT CHANGE UP (ref 1.8–7.4)
NEUTROPHILS # CSF: 0 % — SIGNIFICANT CHANGE UP (ref 0–6)
NEUTROPHILS NFR BLD AUTO: 92 % — HIGH (ref 43–77)
NRBC # BLD: 0 /100 WBCS — SIGNIFICANT CHANGE UP (ref 0–0)
NRBC NFR CSF: 2 /UL — SIGNIFICANT CHANGE UP (ref 0–5)
PHOSPHATE SERPL-MCNC: 3.5 MG/DL — SIGNIFICANT CHANGE UP (ref 2.5–4.5)
PLAT MORPH BLD: NORMAL — SIGNIFICANT CHANGE UP
PLATELET # BLD AUTO: 112 K/UL — LOW (ref 150–400)
POIKILOCYTOSIS BLD QL AUTO: SIGNIFICANT CHANGE UP
POLYCHROMASIA BLD QL SMEAR: SLIGHT — SIGNIFICANT CHANGE UP
POTASSIUM SERPL-MCNC: 4.1 MMOL/L — SIGNIFICANT CHANGE UP (ref 3.5–5.3)
POTASSIUM SERPL-SCNC: 4.1 MMOL/L — SIGNIFICANT CHANGE UP (ref 3.5–5.3)
PROT ?TM UR-MCNC: 14 MG/DL — HIGH (ref 0–12)
PROT CSF-MCNC: 30 MG/DL — SIGNIFICANT CHANGE UP (ref 15–45)
PROT SERPL-MCNC: 7.7 G/DL — SIGNIFICANT CHANGE UP (ref 6–8.3)
RBC # BLD: 5.48 M/UL — SIGNIFICANT CHANGE UP (ref 4.2–5.8)
RBC # CSF: 1 /UL — HIGH (ref 0–0)
RBC # FLD: 31.7 % — HIGH (ref 10.3–14.5)
RBC BLD AUTO: ABNORMAL
SCHISTOCYTES BLD QL AUTO: SLIGHT — SIGNIFICANT CHANGE UP
SODIUM SERPL-SCNC: 139 MMOL/L — SIGNIFICANT CHANGE UP (ref 135–145)
SPECIMEN SOURCE: SIGNIFICANT CHANGE UP
TARGETS BLD QL SMEAR: SLIGHT — SIGNIFICANT CHANGE UP
TESTOST FLD-SCNC: 583.3 NG/DL — SIGNIFICANT CHANGE UP (ref 264–916)
TESTOSTERONE.FREE+WB SERPL-MCNC: 7.8 PG/ML — SIGNIFICANT CHANGE UP (ref 6.8–21.5)
TUBE TYPE: SIGNIFICANT CHANGE UP
WBC # BLD: 2.44 K/UL — LOW (ref 3.8–10.5)
WBC # FLD AUTO: 2.44 K/UL — LOW (ref 3.8–10.5)
ZINC SERPL-MCNC: 92 UG/DL — SIGNIFICANT CHANGE UP (ref 44–115)

## 2021-05-07 PROCEDURE — 72158 MRI LUMBAR SPINE W/O & W/DYE: CPT | Mod: 26

## 2021-05-07 PROCEDURE — 99232 SBSQ HOSP IP/OBS MODERATE 35: CPT | Mod: GC

## 2021-05-07 PROCEDURE — 99233 SBSQ HOSP IP/OBS HIGH 50: CPT | Mod: GC

## 2021-05-07 PROCEDURE — 88188 FLOWCYTOMETRY/READ 9-15: CPT

## 2021-05-07 PROCEDURE — 88108 CYTOPATH CONCENTRATE TECH: CPT | Mod: 26,59

## 2021-05-07 PROCEDURE — 62270 DX LMBR SPI PNXR: CPT | Mod: GC

## 2021-05-07 RX ORDER — LIDOCAINE HCL 20 MG/ML
30 VIAL (ML) INJECTION ONCE
Refills: 0 | Status: DISCONTINUED | OUTPATIENT
Start: 2021-05-07 | End: 2021-05-11

## 2021-05-07 RX ADMIN — Medication 116 MILLIGRAM(S): at 21:53

## 2021-05-07 RX ADMIN — PANTOPRAZOLE SODIUM 40 MILLIGRAM(S): 20 TABLET, DELAYED RELEASE ORAL at 05:42

## 2021-05-07 NOTE — PROGRESS NOTE ADULT - ATTENDING COMMENTS
-F/u MRI LS spine with contrast today.   -F/u LP CSF studies from today. -Added on cytopathology and flow cytometry.   -Can DC PPI given EGD negative for stomach issues. -Per GI, capsule outpatient.   -F/u with heme regarding further work up of his anemia and consideration of BM Bx while inpatient.   -C/w IV methylprednisolone per neuro recs.   -Updated patient and his sister at bedside. Answered all questions. -Encouraged importance of close outpatient neuro, heme, and GI follow up upon discharge. -F/u MRI LS spine with contrast today.   -F/u LP CSF studies from today. -Added on cytopathology and flow cytometry.   -Can DC PPI given EGD negative for stomach issues. -Per GI, capsule outpatient.   -F/u with heme regarding further work up of his anemia and consideration of BM Bx while inpatient.   -C/w IV methylprednisolone per neuro recs.   -F/u Vitamin D level in am. -Replete if low.   -Updated patient and his sister at bedside. Answered all questions. -Encouraged importance of close outpatient neuro, heme, and GI follow up upon discharge.

## 2021-05-07 NOTE — PROGRESS NOTE ADULT - PROBLEM SELECTOR PLAN 7
leukopenia with wbc 3.11 on admission. diff wnl. today wnl.   - possible bone marrow suppression vs malignancy. afebrile, no infection sx including congestion/cough/fevers/chills/diarrhea. less likely infection.   plan  - CTM daily CBC  - f/u heme onc recs    #thrombocytopenia  - on admission plt 237 -> 119 today. stabilized.   - no heparin exposure. possible exposure to pantoprazole.   - discontinue IV pantoprazole BID -> reduced to PO 40mg IV.     #atrophic kidney  - likely 2/2 hx kidney stones requiring lithotripsy. CT AP this admission with non-obstructing right renal stone. Marked right renal scarring and atrophy. No hydronephrosis or mass. leukopenia with wbc 3.11 on admission. diff wnl. today wnl.   - possible bone marrow suppression vs malignancy. afebrile, no infection sx including congestion/cough/fevers/chills/diarrhea. less likely infection.   plan  - CTM daily CBC  - f/u heme onc recs    #thrombocytopenia  - on admission plt 237 -> 112 today. stabilized.   - no heparin exposure. possible exposure to pantoprazole (was started on it for concern of GI bleed)  - discontinue pantoprazole as no evidence of GI bleed    #atrophic kidney  - likely 2/2 hx kidney stones requiring lithotripsy. CT AP this admission with non-obstructing right renal stone. Marked right renal scarring and atrophy. No hydronephrosis or mass.

## 2021-05-07 NOTE — PROGRESS NOTE ADULT - PROBLEM SELECTOR PLAN 3
- 1wk of L leg weakness with worse foot drop and gait difficulty. foot drop present on gait exam. pt had L foot drop since germinoma in 2000 and had high steppage but over last week does not have strength for this and dragging L foot.    - possible cord compression (though foot drop is lower motor neuron) in setting of germinoma with lost to follow up for few years without imaging. no incontinence, weakness of R leg. possible ALS. possible MM.   plan  - f/u MRI lumbar spine. f/u MRI brain - shows unchanged encephalomalacia and gliosis within the genuine of the corpus callosum. Increased signal intensity along the dorsal medulla without enhancement is nonspecific in appearance. Increasing number of foci of elevated signal intensity within the subcortical and periventricular white matter. Differential diagnosis includes ischemic, infectious/inflammatory and demyelinating processes. Cervical spine MRI with contrast may be valuable for further evaluation. New small inferior left frontal meningioma 3.5 x 8.4 mm. Lumbar spine MRI: Unremarkable.  - MRI cervical/thoracic spine - Enhancing left cord lesion at C4-C5 most compatible with acute demyelination. Less likely consideration is metastasis in this patient with history of germinoma. Multiple additional nonenhancing lesions throughout the spinal cord, most compatible with chronic demyelination. Correlate with symptomatology and CSF analysis. 2.0 cm right thyroid nodule. Recommend further evaluation by ultrasound.  - neuro - start solumedrol 1g daily, concern for MS, will do LP for evaluation. - pt had L foot drop since germinoma in 2000 and had high steppage but over last week does not have strength for this and dragging L foot.    plan  - f/u MRI lumbar spine. f/u MRI brain - shows unchanged encephalomalacia and gliosis within the genuine of the corpus callosum. Increased signal intensity along the dorsal medulla without enhancement is nonspecific in appearance. Increasing number of foci of elevated signal intensity within the subcortical and periventricular white matter. Differential diagnosis includes ischemic, infectious/inflammatory and demyelinating processes. Cervical spine MRI with contrast may be valuable for further evaluation. New small inferior left frontal meningioma 3.5 x 8.4 mm. Lumbar spine MRI: Unremarkable.  - MRI cervical/thoracic spine - Enhancing left cord lesion at C4-C5 most compatible with acute demyelination. Less likely consideration is metastasis in this patient with history of germinoma. Multiple additional nonenhancing lesions throughout the spinal cord, most compatible with chronic demyelination. Correlate with symptomatology and CSF analysis. 2.0 cm right thyroid nodule. Recommend further evaluation by ultrasound.  - neuro - start solumedrol 1g daily 5/6 for 5d. s/p LP 5/7, f/u results. concern for MS. f/u further neuro recs.

## 2021-05-07 NOTE — PROGRESS NOTE ADULT - ATTENDING COMMENTS
Agree with above. no findings on EGd/colonoscopy yesterday to account for anemia. If biopsies from duodenum are normal, can plan for capsule endoscopy which can be done as outpatient given no active bleeding, stable H/h and possible need for further MRIs (cannot get MRI after capsule endoscopy).

## 2021-05-07 NOTE — PROGRESS NOTE ADULT - ASSESSMENT
Mr. Duran is a 39 y/o man with hx germinoma (brain) s/p chemo and radiation 2000, hx hemochromatosis requiring phlebotomies, has not followed up with doctors in 3 years presenting for L leg weakness/worsening foot drop pending MRI brain/spine to r/o cord compression/recurrence of germinoma, found to be microcytic anemic hb 6.1 s/p 1U PRBC 5/2 - iron panel concerning for GI malignancy/bleed vs myelodysplastic syndrome vs thalassemia (+ family hx).     FULL CODE  neuro exam - A&O x3, responds to all commands. +nystagmus, tongue fasciculations L foot drop, mild LLE weakness.  Mr. Duran is a 41 y/o man with hx germinoma (brain) s/p chemo and radiation 2000, hx hemochromatosis requiring phlebotomies, has not followed up with doctors in 3 years presenting for L leg weakness/worsening foot drop pending MRI brain/spine to r/o cord compression/recurrence of germinoma, found to be microcytic anemic hb 6.1 MCV 50s, severe JAS with ferritin 2, beta thalassemia minor, low factor 8, EGD and colonoscopy without bleed.     FULL CODE  neuro exam - A&O x3, responds to all commands. +nystagmus, tongue fasciculations L foot drop, mild LLE weakness.  Mr. Duran is a 39 y/o man with hx germinoma (brain) s/p chemo and radiation 2000, hx hemochromatosis requiring phlebotomies, has not followed up with doctors in 3 years presenting for L leg weakness/worsening foot drop pending MRI brain/spine to r/o cord compression/recurrence of germinoma, found to be microcytic anemic hb 6.1 MCV 50s, severe JAS with ferritin 2, beta thalassemia minor, low factor 8, EGD and colonoscopy without bleed. possible MDS. also found to have acute on chronic demyelinating lesions in spinal cord - s/p LP 5/7, possibly MS.     FULL CODE  neuro exam - A&O x3, responds to all commands. +nystagmus, tongue fasciculations L foot drop, mild LLE weakness.

## 2021-05-07 NOTE — PROGRESS NOTE ADULT - PROBLEM SELECTOR PLAN 1
- admitted with hb 6.1 (f/u baseline from PCP), MCV 50.7. RDW 22.9. smear with moderate hypochromia, target cells.  s/p 2U pRBC since admission.   - per pt, has thalassemia and hemochromatosis. [place] heritage. pt required blood transfusions during chemo. hx hemochromatosis requiring phlebotomy every month for few years (confirmed with PCP), last done ~3 years ago per pt.   - FH - mother with thalassemia but never needed blood transfusion.  - iron panel consistent with severe JAS: serum iron 13, TIBC 481, ferritin 2.  LDH and haptoglobin not concerning for hemolysis. retic % 0.7 which is low in setting of anemia with hb <7 suggesting bone marrow suppression. HIV neg.   - CT AP 5/5  with moderate splenomegaly (15.3cm), otherwise normal. DDX of splenomegaly - myeloproliferative vs thalassemia. no infectious sx though IgM elevated.   - celiac labs (IgM, anti-transglutaminase) - IgM elevated suggesting infection. anti transglutaminase neg  - etiology of present anemia: possible GI malignancy vs myelodysplastic syndrome vs thalassemia. no bleeding reported by pt (no melena/hematoschezia, no hemoptysis/hematemesis, no hematuria). However, pos FOBT and severe JAS w ferritin 2 concerning for possible GI malignancy. severe microcytic anemia concerning for thalassemia (pt reports he has hx but unclear) though anemia is new (no records available per PCP given pt has not followed up in few years). possible MDS given anemia, could have acquired thalassemia (hemoglobin H) causing anemia.   plan  - s/p 2U pRBC 5/2 and 5/3. s/p 1 day venofer 200 5/4 per heme onc recs. venofer 200 5/6.   - f/u heme/onc recs   - GI consulted for endoscopy/colonoscopy to evaluate for malignancy/GI bleed - to be done today, f/u.   - f/u hemoglobin electropheresis - beta thalassemia minor (hb A 95.1, hb A2 elevated 4.9)  - f/u clotting factor lvls - factor 8 23, factor 9 73. von willebrand factor activity wnl  - f/u lead - wnl, zn - wnl, heavy metal poison - neg - admitted with hb 6.1 (f/u baseline from PCP), MCV 50.7. RDW 22.9. smear with moderate hypochromia, target cells.  s/p 2U pRBC since admission.   - pt notes hx hemochromatosis (no genetic testing, MRI with evidence of iron deposition in liver per outpt heme/onc) and thalassemia (confirmed to have beta thalassemic minor this admission on hb electrophoresis)  - severe JAS with ferritin 2. HIV negative. Celiac lab negative (anti transglutaminase neg, IgM elevated). parvovirus neg. retic low in setting of anemia suggesting bone marrow suppression (MDS). lead/heavy metal neg.   - CT AP 5/5  with moderate splenomegaly (15.3cm), otherwise normal. DDX of splenomegaly - myeloproliferative vs thalassemia. no infectious sx though IgM elevated.   - EGD colonoscopy without bleeding evidence/malignancy.   ETIOLOGY: likely MDS given negative work up above. Possible bleeding in small bowel - capsule study outpt.   - f/u heme/onc, GI recs - admitted with hb 6.1 (f/u baseline from PCP), MCV 50.7. RDW 22.9. smear with moderate hypochromia, target cells.  s/p 2U pRBC since admission.   - severe JAS with ferritin 2. HIV negative. Celiac lab negative (anti transglutaminase neg, IgM elevated). parvovirus neg. retic low in setting of anemia suggesting bone marrow suppression (MDS). lead/heavy metal neg.   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   - CT AP 5/5  with moderate splenomegaly (15.3cm), otherwise normal. DDX of splenomegaly - possible myeloproliferative vs thalassemia. no infectious sx though IgM elevated.   - EGD colonoscopy without bleeding evidence/malignancy.   ETIOLOGY: likely MDS given negative work up above. Possible bleeding in small bowel - capsule study outpt.   PLAN: f/u heme/onc, GI recs

## 2021-05-07 NOTE — PROGRESS NOTE ADULT - ASSESSMENT
40M w/ hx of germinoma s/p chemo and XRT, prior hx of hemochromatosis requiring phlebotomy (none for last 3 years), FHx of thalassemia in mother, presenting w/ food drop, found to have severe iron deficiency anemia w/ Hb 6, ferritin of 2, MCV 50s, now s/p EGD/colonoscopy without sources of JAS.     Impression:  #Severe microcytic anemia w/ severe iron deficiency - hgb stable. No GI bleeding. Likely hematologically driven from thalassemia. Underwent EGD/colonoscopy which were relatively wnl without significant sources of JAS -- EGD with small fundic gland polyps, small diverticulosis.  #Germinoma brain s/p chemo and XRT  #Hx of hemochromatosis requiring phlebotomy  # Abnormal MR imaging    Recommendations:  - Follow up path from duodenal biopsies   - Can get capsule endoscopy as an outpatient if JAS persists  - Repeat colonoscopy at age 44 for screening purposes.  - No further inpatient GI work up         Thank you for involving us in the care of this patient, please reach out if any further questions.     Angel Wright MD  Gastroenterology Fellow, PGY4    Available on [employer] Teams  [phone] ([facility])  [id number] ([facility])  Please contact on call fellow weekdays after 5pm-7am and weekends: [phone]

## 2021-05-07 NOTE — PROGRESS NOTE ADULT - PROBLEM SELECTOR PLAN 5
- hx hemochromatosis requiring monthly phlebotomies (confirmed with PCP Dr. Ponce) - last phlebotomy 3 months ago  plan  - currently not in iron overload in setting of anemia above. CTM ferritin with blood transfusions.  - f/u genetic testing, out-patient MRI of liver to evaluate for iron deposition

## 2021-05-07 NOTE — PROGRESS NOTE ADULT - PROBLEM SELECTOR PLAN 6
- PTT prolonged to 43 on admission (INR and PT wnl). factor 8 deficiency.   - etiology: not on heparin. not on anticoagulants. possible von willebrand, hemophilia. not concerning for DIC. less likely cirrhosis given INR wnl, thrombycytopenia since yesterday, albumin wnl.   plan  - f/u heme/onc recs

## 2021-05-07 NOTE — PROGRESS NOTE ADULT - SUBJECTIVE AND OBJECTIVE BOX
Interval History:  Patient seen and examined at the bedside this AM. No acute events overnight. Bedside LP performed this AM.     MEDICATIONS  (STANDING):  lidocaine 1% Injectable 30 milliLiter(s) Local Injection once  methylPREDNISolone sodium succinate IVPB 1000 milliGRAM(s) IV Intermittent daily  pantoprazole  Injectable 40 milliGRAM(s) IV Push daily    MEDICATIONS  (PRN):  acetaminophen   Tablet .. 650 milliGRAM(s) Oral every 6 hours PRN Mild Pain (1 - 3)  polyethylene glycol 3350 17 Gram(s) Oral daily PRN Constipation    Allergies  No Known Allergies    Intolerances      ROS: Pertinent positives in HPI, all other ROS were reviewed and are negative.      Vital Signs Last 24 Hrs  T(C): 36.4 (07 May 2021 09:18), Max: 37 (07 May 2021 01:02)  T(F): 97.5 (07 May 2021 09:18), Max: 98.6 (07 May 2021 01:02)  HR: 64 (07 May 2021 09:18) (55 - 79)  BP: 119/73 (07 May 2021 09:18) (105/63 - 124/85)  BP(mean): --  RR: 18 (07 May 2021 09:18) (13 - 21)  SpO2: 99% (07 May 2021 09:18) (97% - 100%)    NEUROLOGICAL EXAM:  MS: AAOX3, fluent, attends b/l; recent and remote memory intact; normal attention, language and fund of knowledge.   CN: VFF, EOMI, PERRL, no LISA, no APD,  V1-3 intact, no facial asymmetry, t/p midline, SCM/trap intact.  Motor:  Strength: 5/5 upper extremities and RLE. LLE: H-flex:4, H-abd:4+, H-add:4+, D-flex:4+, otherwise 5/5.   Coordination: intact FTN  Gait:  L foot drag      Labs:   cbc                      8.6    2.44  )-----------( 112      ( 07 May 2021 07:04 )             30.9     [id number]    139  |  103  |  13  ----------------------------<  167<H>  4.1   |  21<L>  |  1.09    Ca    9.9      07 May 2021 07:06  Phos  3.5     05-07  Mg     2.2     05-07    TPro  7.7  /  Alb  5.0  /  TBili  1.5<H>  /  DBili  x   /  AST  29  /  ALT  19  /  AlkPhos  61  05-07    CoagsPT/INR - ( 06 May 2021 07:15 )   PT: 13.5 sec;   INR: 1.13 ratio         PTT - ( 06 May 2021 07:15 )  PTT:45.3 sec  Lipids  A1C  CardiacMarkers    LFTsLIVER FUNCTIONS - ( 07 May 2021 07:06 )  Alb: 5.0 g/dL / Pro: 7.7 g/dL / ALK PHOS: 61 U/L / ALT: 19 U/L / AST: 29 U/L / GGT: x           UA

## 2021-05-07 NOTE — MEDICAL STUDENT PROGRESS NOTE(EDUCATION) - SUBJECTIVE AND OBJECTIVE BOX
PROGRESS NOTE:   ************************************************  Authored by: Cedric Etienne, MS3  *************************************************    40y Male w/ hx of brain germinoma s/p chemo and radiation (), hemochromatosis requiring phlebotomies, and thalassemia presenting with L leg weakness/ worsening foot drop and microcytic anemia (hgb 6.1 at admission).      INTERVAL/OVERNIGHT EVENTS:   - No acute events overnight.   - Patient says he is feeling well. He says his left leg weakness persists, perhaps slightly better than at admission. He denies any new symptoms, including N/V, chest pain, shortness of breath, overt bleeding, hematochezia, or hematuria.     [x] History per: patient    [x] There are no updates to the medical, surgical, social or family history unless described:    Review of Systems: History Per:   General: [ ] Neg  Pulmonary: [ ] Neg  Cardiac: [ ] Neg  Gastrointestinal: [ ] Neg  Ears, Nose, Throat: [ ] Neg  Renal/Urologic: [ ] Neg  Musculoskeletal: [ ] Neg  Endocrine: [ ] Neg  Hematologic: [ ] Neg  Neurologic: [ ] Neg  Allergy/Immunologic: [ ] Neg  All other systems reviewed and negative [x]     MEDICATIONS  (STANDING):  lidocaine 1% Injectable 30 milliLiter(s) Local Injection once  methylPREDNISolone sodium succinate IVPB 1000 milliGRAM(s) IV Intermittent daily  pantoprazole  Injectable 40 milliGRAM(s) IV Push daily    MEDICATIONS  (PRN):  acetaminophen   Tablet .. 650 milliGRAM(s) Oral every 6 hours PRN Mild Pain (1 - 3)  polyethylene glycol 3350 17 Gram(s) Oral daily PRN Constipation    Allergies    No Known Allergies    Intolerances      DIET:     PHYSICAL EXAM  Vital Signs Last 24 Hrs  T(C): 36.4 (07 May 2021 09:18), Max: 37 (07 May 2021 01:02)  T(F): 97.5 (07 May 2021 09:18), Max: 98.6 (07 May 2021 01:02)  HR: 64 (07 May 2021 09:18) (55 - 79)  BP: 119/73 (07 May 2021 09:18) (105/63 - 124/85)  BP(mean): --  RR: 18 (07 May 2021 09:18) (13 - 21)  SpO2: 99% (07 May 2021 09:18) (97% - 100%)    PATIENT CARE ACCESS DEVICES  [x] Peripheral IV  [ ] Central Venous Line, Date Placed:		Site/Device:  [ ] PICC, Date Placed:  [ ] Urinary Catheter, Date Placed:  [ ] Necessity of urinary, arterial, and venous catheters discussed    I&O's Summary    03 May 2021 07:01  -  04 May 2021 07:00  --------------------------------------------------------  IN: 1680 mL / OUT: 675 mL / NET: 1005 mL        Daily   BMI (kg/m2): 21.5 (-02 @ 11:08)    VS reviewed, stable.  Gen: patient is alert, smiling, interactive, well appearing, no acute distress including pain  HEENT: NC/AT, pupils equal, responsive, reactive to light and accomodation, +non-fatiguable nystagmus on horizontal end gaze (pt has always had trouble "focusing" on objects); visual fields full; no conjunctivitis or scleral icterus; no nasal discharge or congestion. OP without exudates/erythema; +tongue fasciculations  Neck: FROM, supple, no cervical LAD  Chest: CTA b/l, no crackles/wheezes, good air entry, no tachypnea or retractions  CV: regular rate and rhythm, no murmurs   Abd: soft, nontender, nondistended, no HSM appreciated, +BS  : deferred  Extrem: No joint effusion or tenderness; FROM of all joints; no deformities or erythema noted. 2+ peripheral pulses, WWP.   Neuro: CN II-XII intact. Strength in B/L UEs 5/5; Strength in RLE 5/5; Left dorsi/plantar flexion 4/5-- other LLE 5/5 strength; sensation intact and equal in b/l LEs and b/l UEs. High stepping gate with left foot; 3+ L bi/tri/brach/pat/ach, 2+ R bi/tri/brach/pat/ach; +babinski on left    INTERVAL LAB RESULTS:                         8.6    2.44  )-----------( 112      ( 07 May 2021 07:04 )             30.9                         7.6    3.08  )-----------( 113      ( 06 May 2021 07:13 )             27.9                        8.6    3.55  )-----------( 119      ( 05 May 2021 07:27 )             30.9                        8.1    4.47  )-----------( 109      ( 04 May 2021 07:21 )             29.3                         8.1    4.40  )-----------( 113      ( 03 May 2021 16:55 )             29.5                         6.7    3.72  )-----------( 104      ( 03 May 2021 07:45 )             24.3   05-07    139  |  103  |  13  ----------------------------<  167<H>  4.1   |  21<L>  |  1.09    Ca    9.9      07 May 2021 07:06  Phos  3.5     05-07  Mg     2.2     05-07    TPro  7.7  /  Alb  5.0  /  TBili  1.5<H>  /  DBili  x   /  AST  29  /  ALT  19  /  AlkPhos  61  05-07    05-06    142  |  103  |  10  ----------------------------<  83  4.3   |  24  |  1.10    Ca    9.7      06 May 2021 07:19  Phos  3.4     05-06  Mg     2.2     05-06    TPro  7.4  /  Alb  4.7  /  TBili  1.5<H>  /  DBili  x   /  AST  16  /  ALT  14  /  AlkPhos  57  05-06    05-05    141  |  104  |  11  ----------------------------<  93  4.0   |  22  |  1.02    Ca    9.7      05 May 2021 07:22  Phos  2.7     05-05  Mg     2.2     05-05    TPro  7.4  /  Alb  5.0  /  TBili  1.4<H>  /  DBili  x   /  AST  20  /  ALT  18  /  AlkPhos  60  05-05                              140    |  103    |  10                  Calcium: 9.6   / iCa: x      ( @ 07:22)    ----------------------------<  80        Magnesium: 2.3                              3.8     |  22     |  0.94             Phosphorous: 3.4      TPro  7.3    /  Alb  4.5    /  TBili  1.4    /  DBili  x      /  AST  17     /  ALT  17     /  AlkPhos  53     04 May 2021 07:22    Urinalysis Basic - ( 02 May 2021 18:32 )    Color: Light Yellow / Appearance: Clear / S.013 / pH: x  Gluc: x / Ketone: Negative  / Bili: Negative / Urobili: Negative   Blood: x / Protein: Negative / Nitrite: Negative   Leuk Esterase: Negative / RBC: 1 /hpf / WBC 0 /HPF   Sq Epi: x / Non Sq Epi: 0 /hpf / Bacteria: Negative          INTERVAL IMAGING STUDIES:  < from: CT Abdomen and Pelvis w/ IV Cont (21 @ 15:52) >  IMPRESSION:  No evidence of malignancy.  Moderate splenomegaly.    < end of copied text >  < from: CT Head No Cont (21 @ 12:03) >  IMPRESSION:Mild sulcal prominence suggesting mild volume loss which is nonspecific but may be related to chronic illness or certain medications. No masses. No change since 3/29/2012.    < end of copied text >  < from: MR Head w/wo IV Cont (21 @ 00:18) >  IMPRESSION:    Brain MRI: Unchanged encephalomalacia and gliosis within the genuine of the corpus callosum.    Increased signal intensity along the dorsal medulla without enhancement is nonspecific in appearance. Increasing number of foci of elevated signal intensity within the subcortical and periventricular white matter. Differential diagnosis includes ischemic, infectious/inflammatory and demyelinating processes. Cervical spine MRI with contrast may be valuable for further evaluation.    New small inferior left frontal meningioma.    Lumbar spine MRI: Unremarkable.    < end of copied text >  < from: MR Cervical Spine w/wo IV Cont (21 @ 22:09) >  IMPRESSION:    -Enhancing left cord lesion at C4-C5 most compatible with acute demyelination. Less likely consideration is metastasis in this patient with history of germinoma. Multiple additional nonenhancing lesions throughout the spinal cord, most compatible with chronic demyelination. Correlate with symptomatologyand CSF analysis.    -2.0 cm right thyroid nodule. Recommend further evaluation by ultrasound.    < end of copied text >

## 2021-05-07 NOTE — PROGRESS NOTE ADULT - SUBJECTIVE AND OBJECTIVE BOX
Precious Vallse PGY1    Patient is a 40y old  Male who presents with a chief complaint of Anemia (04 May 2021 12:16)      SUBJECTIVE / OVERNIGHT EVENTS:    MEDICATIONS  (STANDING):  methylPREDNISolone sodium succinate IVPB 1000 milliGRAM(s) IV Intermittent daily  pantoprazole  Injectable 40 milliGRAM(s) IV Push daily    MEDICATIONS  (PRN):  acetaminophen   Tablet .. 650 milliGRAM(s) Oral every 6 hours PRN Mild Pain (1 - 3)  polyethylene glycol 3350 17 Gram(s) Oral daily PRN Constipation      CAPILLARY BLOOD GLUCOSE        I&O's Summary    06 May 2021 07:01  -  07 May 2021 07:00  --------------------------------------------------------  IN: 540 mL / OUT: 0 mL / NET: 540 mL        Vital Signs Last 24 Hrs  T(C): 36.9 (07 May 2021 06:16), Max: 37 (07 May 2021 01:02)  T(F): 98.5 (07 May 2021 06:16), Max: 98.6 (07 May 2021 01:02)  HR: 75 (07 May 2021 06:16) (55 - 79)  BP: 105/63 (07 May 2021 06:16) (105/63 - 124/85)  BP(mean): --  RR: 18 (07 May 2021 06:16) (13 - 21)  SpO2: 97% (07 May 2021 06:16) (97% - 100%)    PHYSICAL EXAM:  GENERAL: lying down, appears comfortable on RA  HEAD:  Atraumatic, Normocephalic  EYES: extra ocular movements intact, horizontal nystagmus on lateral gaze  ENT: Moist mucous membranes  NECK: Supple  CHEST/LUNG: clear to auscultation bilaterally, no crackles/wheeze/ronchi  HEART: regular rate and rhythm, no murmurs  ABDOMEN: nontender abdomen, no masses palpated. no rebound/guarding.   EXTREMITIES:  no edema. able to move all extremities. no rashes.   NERVOUS SYSTEM: A&O x3, CN2-12 - horizontal nystagmus with lateral gaze bilaterally but EOMI , fasciculations in tongue +, otherwise CN2-12 wnl. 5/5  str bilateral UE. no dysmetria, no dyskinesia. no tremors at rest or action. no pronator drift, neg romberg.   - GAIT: prominent L foot drop, dragging L foot with each step.   MSK: FROM all 4 extremities, full and equal strength  SKIN: No rashes or lesions    LABS:                        7.6    3.08  )-----------( 113      ( 06 May 2021 07:13 )             27.9      05-06    142  |  103  |  10  ----------------------------<  83  4.3   |  24  |  1.10    Ca    9.7      06 May 2021 07:19  Phos  3.4     05-06  Mg     2.2     05-06    TPro  7.4  /  Alb  4.7  /  TBili  1.5<H>  /  DBili  x   /  AST  16  /  ALT  14  /  AlkPhos  57  05-06    PT/INR - ( 06 May 2021 07:15 )   PT: 13.5 sec;   INR: 1.13 ratio         PTT - ( 06 May 2021 07:15 )  PTT:45.3 sec          RADIOLOGY & ADDITIONAL TESTS:    Imaging Personally Reviewed:    Consultant(s) Notes Reviewed:      Care Discussed with Consultants/Other Providers:   Precious Valles PGY1    Patient is a 40y old  Male who presents with a chief complaint of Anemia (04 May 2021 12:16)      SUBJECTIVE / OVERNIGHT EVENTS:  - overnight - no events  - am -     MEDICATIONS  (STANDING):  methylPREDNISolone sodium succinate IVPB 1000 milliGRAM(s) IV Intermittent daily  pantoprazole  Injectable 40 milliGRAM(s) IV Push daily    MEDICATIONS  (PRN):  acetaminophen   Tablet .. 650 milliGRAM(s) Oral every 6 hours PRN Mild Pain (1 - 3)  polyethylene glycol 3350 17 Gram(s) Oral daily PRN Constipation      CAPILLARY BLOOD GLUCOSE        I&O's Summary    06 May 2021 07:01  -  07 May 2021 07:00  --------------------------------------------------------  IN: 540 mL / OUT: 0 mL / NET: 540 mL        Vital Signs Last 24 Hrs  T(C): 36.9 (07 May 2021 06:16), Max: 37 (07 May 2021 01:02)  T(F): 98.5 (07 May 2021 06:16), Max: 98.6 (07 May 2021 01:02)  HR: 75 (07 May 2021 06:16) (55 - 79)  BP: 105/63 (07 May 2021 06:16) (105/63 - 124/85)  BP(mean): --  RR: 18 (07 May 2021 06:16) (13 - 21)  SpO2: 97% (07 May 2021 06:16) (97% - 100%)    PHYSICAL EXAM:  GENERAL: lying down, appears comfortable on RA  HEAD:  Atraumatic, Normocephalic  EYES: extra ocular movements intact, horizontal nystagmus on lateral gaze  ENT: Moist mucous membranes  NECK: Supple  CHEST/LUNG: clear to auscultation bilaterally, no crackles/wheeze/ronchi  HEART: regular rate and rhythm, no murmurs  ABDOMEN: nontender abdomen, no masses palpated. no rebound/guarding.   EXTREMITIES:  no edema. able to move all extremities. no rashes.   NERVOUS SYSTEM: A&O x3, CN2-12 - horizontal nystagmus with lateral gaze bilaterally but EOMI , fasciculations in tongue +, otherwise CN2-12 wnl. 5/5  str bilateral UE. no dysmetria, no dyskinesia. no tremors at rest or action. no pronator drift, neg romberg.   - GAIT: prominent L foot drop, dragging L foot with each step.   MSK: FROM all 4 extremities, full and equal strength  SKIN: No rashes or lesions    LABS:                        7.6    3.08  )-----------( 113      ( 06 May 2021 07:13 )             27.9      05-06    142  |  103  |  10  ----------------------------<  83  4.3   |  24  |  1.10    Ca    9.7      06 May 2021 07:19  Phos  3.4     05-06  Mg     2.2     05-06    TPro  7.4  /  Alb  4.7  /  TBili  1.5<H>  /  DBili  x   /  AST  16  /  ALT  14  /  AlkPhos  57  05-06    PT/INR - ( 06 May 2021 07:15 )   PT: 13.5 sec;   INR: 1.13 ratio         PTT - ( 06 May 2021 07:15 )  PTT:45.3 sec          RADIOLOGY & ADDITIONAL TESTS:    Imaging Personally Reviewed:    Consultant(s) Notes Reviewed:      Care Discussed with Consultants/Other Providers:   Precious Reena PGY1    Patient is a 40y old  Male who presents with a chief complaint of Anemia (04 May 2021 12:16)      SUBJECTIVE / OVERNIGHT EVENTS:  - overnight - no events  - am - pt had LP this am, no back pain, no headache. no improvement in weakness of LLE. no new weakness. discussed plan with him and results.     MEDICATIONS  (STANDING):  methylPREDNISolone sodium succinate IVPB 1000 milliGRAM(s) IV Intermittent daily  pantoprazole  Injectable 40 milliGRAM(s) IV Push daily    MEDICATIONS  (PRN):  acetaminophen   Tablet .. 650 milliGRAM(s) Oral every 6 hours PRN Mild Pain (1 - 3)  polyethylene glycol 3350 17 Gram(s) Oral daily PRN Constipation      CAPILLARY BLOOD GLUCOSE        I&O's Summary    06 May 2021 07:01  -  07 May 2021 07:00  --------------------------------------------------------  IN: 540 mL / OUT: 0 mL / NET: 540 mL        Vital Signs Last 24 Hrs  T(C): 36.9 (07 May 2021 06:16), Max: 37 (07 May 2021 01:02)  T(F): 98.5 (07 May 2021 06:16), Max: 98.6 (07 May 2021 01:02)  HR: 75 (07 May 2021 06:16) (55 - 79)  BP: 105/63 (07 May 2021 06:16) (105/63 - 124/85)  BP(mean): --  RR: 18 (07 May 2021 06:16) (13 - 21)  SpO2: 97% (07 May 2021 06:16) (97% - 100%)    PHYSICAL EXAM:  GENERAL: lying down, appears comfortable on RA  HEAD:  Atraumatic, Normocephalic  EYES: extra ocular movements intact, horizontal nystagmus on lateral gaze  ENT: Moist mucous membranes  NECK: Supple  CHEST/LUNG: clear to auscultation bilaterally, no crackles/wheeze/ronchi  HEART: regular rate and rhythm, no murmurs  ABDOMEN: nontender abdomen, no masses palpated. no rebound/guarding.   EXTREMITIES:  no edema. able to move all extremities. no rashes.   NERVOUS SYSTEM: A&O x3, CN2-12 - horizontal nystagmus with lateral gaze bilaterally but EOMI , fasciculations in tongue +, otherwise CN2-12 wnl. 5/5  str bilateral UE. no dysmetria, no dyskinesia. no tremors at rest or action. no pronator drift, neg romberg.   - GAIT: prominent L foot drop, dragging L foot with each step.   MSK: FROM all 4 extremities, full and equal strength  SKIN: No rashes or lesions    LABS:                        7.6    3.08  )-----------( 113      ( 06 May 2021 07:13 )             27.9      05-06    142  |  103  |  10  ----------------------------<  83  4.3   |  24  |  1.10    Ca    9.7      06 May 2021 07:19  Phos  3.4     05-06  Mg     2.2     05-06    TPro  7.4  /  Alb  4.7  /  TBili  1.5<H>  /  DBili  x   /  AST  16  /  ALT  14  /  AlkPhos  57  05-06    PT/INR - ( 06 May 2021 07:15 )   PT: 13.5 sec;   INR: 1.13 ratio         PTT - ( 06 May 2021 07:15 )  PTT:45.3 sec          RADIOLOGY & ADDITIONAL TESTS:    Imaging Personally Reviewed:    Consultant(s) Notes Reviewed:      Care Discussed with Consultants/Other Providers:

## 2021-05-07 NOTE — PROGRESS NOTE ADULT - PROBLEM SELECTOR PLAN 4
- hx germinoma (brain, specific location need to obtain records from [facility]) dx in 2000 s/p chemo/radiation in [id number]. dx when he presented with L foot drop/weakness.   - used to see neuro at [facility], lost to follow up for last few years (pt says because of "pandemic")  plan  - no recurrence on MRI

## 2021-05-07 NOTE — MEDICAL STUDENT PROGRESS NOTE(EDUCATION) - NS MD HP STUD ASPLAN PLAN FT
1. Iron deficiency anemia (MCV 50, ferritin 2) with inappropriate bone marrow response (retic 0.7%): consider 2/2 myelodysplastic syndrome (2/2 chemoradiation hx) +/- acquired alpha thalassemia/ hgb H (occurs in <10% of MDS) vs. blood loss (ie GI malignancy) vs. 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  - f/u duodenal bx  - transfuse for hgb <7  - iron deficit= 933mg, s/p 800mg; will hold additional iron for now given hx of hemochromatosis  - d/c pantoprazole given UGIB ruled out  - f/u hgb electrophoresis: beta thal minor  - f/u zn, copper, heavy metal: negative  - f/u anti-transglutaminase: negative  - heme recs: bone marrow bx?    2. New L leg weakness: likely 2/2 chronic demyelinating process (ie MS vs. NMO vs. paraneoplastic) vs metastasis given MRI c/t-spine 5/5 revealing "enhancing left cord lesion at C4-C5 and Multiple additional nonenhancing lesions throughout the spinal cord." MRI head 5/3: Increased signal intensity along the dorsal medulla without enhancement is nonspecific in appearance. Increasing number of foci of elevated signal intensity within the subcortical and periventricular white matter. MRI L-spine unremarkable; AFP, hCG wnl   - LP 5/7; f/u CSF studies  - C/w IV solumedrol 1g x 5 days (5/6- 5/10)  - f/u serum vitamin E  - OT/PT following  - f/u vit D level  - appreciate neuro recs    3. 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 Hx of hemochromatosis w/ phlebotomy management  - MRI liver outpatient  - monitor ferritin w/ blood transfusions  - recs as per heme/onc    6. Leukopenia: consider 2/2 MDS/ bone malignancy; infection appears less likely given afebrile, vitals stable, no source  - CBC daily    7. Thrombocytopenia: consider 2/2 MDS, aplastic anemia  - CBC daily    8. Tongue fasciculation consider 2/2 to demyelinating process as above vs prior brain germinoma vs new malignancy vs SMA vs. benign finding  - f/u neuro recs  - f/u brain mri    9. Nystagmus: old finding per patient, perhaps 2/2 to prior germinoma vs chronic demyelinating process  - f/u neuro recs  - f/u brain mri    10. Thyroid nodule, likely benign given small size, asymptomatic  - thyroid u/s at outpatient    11. DVT: SCD (given possible GI bleed, profound anemia)    12. FENGI: regular diet

## 2021-05-07 NOTE — MEDICAL STUDENT PROGRESS NOTE(EDUCATION) - NS MD HP STUD ASPLAN ASSES FT
40y Male w/ hx of brain germinoma s/p chemo and radiation (2000), hemochromatosis requiring phlebotomies, and thalassemia presenting with L leg weakness/ worsening foot drop and microcytic anemia (hgb 6.1 at admission).

## 2021-05-07 NOTE — PROGRESS NOTE ADULT - ASSESSMENT
Patient is a 40 year old male, PMH brain germinoma s/p chemo/rtx 2000 w/ serial MR surveillance showing resolution of tumor (last MRI 2012 within our EMR) presenting for LLE weakness. On exam, 4/5 L hip flexion, 4+/5 L hip abduction, 4+/5 L hip adduction, 4+/5 L dorsiflexion, w/ 5/5 all throughout elsewhere. Hgb at ~6. CTH showed no evidence of gross mass or other acute pathologies. MRI  Increased signal intensity along the dorsal medulla without enhancement is nonspecific in appearance. Increasing number of foci of elevated signal intensity within the subcortical and periventricular white matter. MR c-spine showed Enhancing left cord lesion at C4-C5 most compatible with acute demyelination. Less likely consideration is metastasis in this patient with history of germinoma. Multiple additional nonenhancing lesions throughout the spinal cord, most compatible with chronic demyelination. LLE weakness likely 2/2 demyelinating CNS process likely MS.       Plan:  [] f/u CSF studies from LP today 5/7/21   [] Continue Solumedrol x 5 days (5/6-5/10)  []Fall precautions  []Hgb correction per primary team    Case discussed with neurology attending Dr. Buchanan

## 2021-05-07 NOTE — PROGRESS NOTE ADULT - ATTENDING COMMENTS
Pt care and plan discussed and reviewed with NP. Plan as outlined above edited by me to reflect our discussion. Pt care and plan discussed and reviewed with NP. Plan as outlined above edited by me to reflect our discussion. I had a prolonged conversation with the patient and his sister at bedside regarding hospital course, differential diagnosis and results of diagnostic tests thus far.  Plan of care discussed with patient after the evaluation. Patient expresses clear understanding and satisfaction with the plan of care. Sixty five minutes spent on total encounter, of which more than fifty percent of the encounter was spent on counseling and/or coordinating care by the attending physician.

## 2021-05-07 NOTE — PROGRESS NOTE ADULT - SUBJECTIVE AND OBJECTIVE BOX
Patient is a 40y old  Male who presents with a chief complaint of Anemia (04 May 2021 12:16)      INTERVAL HISTORY: feels ok s/p LP this am    REVIEW OF SYSTEMS:   CONSTITUTIONAL: No weakness  EYES/ENT: No visual changes; No throat pain  Neck: No pain or stiffness  Respiratory: No cough, wheezing, No shortness of breath  CARDIOVASCULAR: no chest pain or palpitations  GASTROINTESTINAL: No abdominal pain, no nausea, vomiting or hematemesis  GENITOURINARY: No dysuria, frequency or hematuria  NEUROLOGICAL: No stroke like symptoms  SKIN: No rashes    MEDICATIONS:    PHYSICAL EXAM:  T(C): 36.4 (05-07-21 @ 09:18), Max: 37 (05-07-21 @ 01:02)  HR: 64 (05-07-21 @ 09:18) (55 - 79)  BP: 119/73 (05-07-21 @ 09:18) (105/63 - 124/85)  RR: 18 (05-07-21 @ 09:18) (13 - 21)  SpO2: 99% (05-07-21 @ 09:18) (97% - 100%)  Wt(kg): --  I&O's Summary    06 May 2021 07:01  -  07 May 2021 07:00  --------------------------------------------------------  IN: 540 mL / OUT: 0 mL / NET: 540 mL    07 May 2021 07:01  -  07 May 2021 11:40  --------------------------------------------------------  IN: 160 mL / OUT: 0 mL / NET: 160 mL    Appearance: In no distress	  HEENT:    PERRL, EOMI	  Cardiovascular:  S1 S2, No JVD  Respiratory: Lungs clear to auscultation	  Gastrointestinal:  Soft, Non-tender, + BS	  Vascularature:  No edema of LE  Psychiatric: Appropriate affect   Neuro: no acute focal deficits                         8.6    2.44  )-----------( 112      ( 07 May 2021 07:04 )             30.9     05-07    139  |  103  |  13  ----------------------------<  167<H>  4.1   |  21<L>  |  1.09    Ca    9.9      07 May 2021 07:06  Phos  3.5     05-07  Mg     2.2     05-07    TPro  7.7  /  Alb  5.0  /  TBili  1.5<H>  /  DBili  x   /  AST  29  /  ALT  19  /  AlkPhos  61  05-07  Labs personally reviewed    ASSESSMENT/PLAN: 	  Mr. Durna is a 39 y/o man with hx germinoma (brain) s/p chemo and radiation 2000, hx hemochromatosis requiring phlebotomies, has not followed up with doctors in 3 years presenting for L leg weakness/worsening foot drop pending MRI brain/spine to r/o cord compression/recurrence of germinoma, found to be microcytic anemic hb 6.1 s/p 1U PRBC 5/2 - iron panel concerning for GI malignancy/bleed vs myelodysplastic syndrome vs thalassemia (+ family hx).       Problem/Plan - 1:   ·  Problem: SOB.  Plan: Overall weakness and SOB 2/2 significant anemia of 6.1  - Transfuse PRBC to keep hb >7  - pending GI workup with egd/colonoscopy  - EKG with NSR no ischemic changes    Problem/Plan - 2:  ·  Problem: Anemia.  Plan: - admitted with hb 6.1  - clarified hemochromatosis Hx with PCP, never had anemia prior from it  - on the contrary hx hemochromatosis requiring phlebotomy every month for few years, last done ~3 years ago per pt.   - etiology of present anemia: denies melena/ hematochezia no hemoptysis/hematemesis, no hematuria. He is FOBT positive  - ? chronic slow GI blood loss anemia over   - CT abd with moderate splenomegaly  - f/u hemoglobin electropheresis  -Iron panel with severe iron def anemia   - Heme/onc recs and follow up appreciated   - EGD/ Colonoscopy with no obvious source of bleed, f/u biopsies   - possible LP tomorrow     Problem/Plan - 3:  ·  Problem: Germinoma.  Plan: - hx germinoma (brain) dx in 2000 s/p chemo/radiation in 8718-9101. dx when he presented with L foot drop/weakness.   -pending CT A/P malignancy workup   - MRI head Increased signal intensity along the dorsal medulla without enhancement is nonspecific in appearance. Increasing number of foci of elevated signal intensity within the subcortical and periventricular white matter. Differential diagnosis includes ischemic, infectious/inflammatory and demyelinating processes.   - Heme/onc follow up     Problem/Plan - 4:  ·  Problem: Hemochromatosis.  Plan: - hx hemochromatosis requiring monthly phlebotomies (confirmed with PCP Dr. Ponce) - last phlebotomy 3 months ago  plan  - iron panel consistent with severe Iron def anemia, suspicious for blood loss anemia   - heme rec MRI liver and genetic testing for hemachromatosis   - f/u CSF studies from LP today     Problem/Plan - 5:  ·  Problem: Leukopenia.  Plan: leukopenia with wbc 3.11. diff wnl.   - possible bone marrow suppression vs malignancy. afebrile, no infection sx including congestion/cough/fevers/chills/diarrhea. Less likely infection.   plan  - CTM daily CBC  - f/u heme onc recs  - f/u CSF studies from LP today     Problem/Plan - 6:  Problem: Fasciculation of tongue. Plan: - fasciulation of tongue observed on exam  - etiology: benign    Problem/Plan - 7:  ·  Problem: Nystagmus.  Plan: - horizontal nystagmus with lateral eye movements - pt states has had before, is not sure cause.   - etiology: cerebellum lesion though no dysmetria and no dyskinesia.     Problem/Plan - 8:  ·  Problem: Abnormal MRI brain.  Plan: MRI head Increased signal intensity along the dorsal medulla without enhancement is nonspecific in appearance. Increasing number of foci of elevated signal intensity within the subcortical and periventricular white matter. Differential diagnosis includes ischemic, infectious/inflammatory and demyelinating processes.   -MR Thoracic and Cervical Spine w/wo IV Cont were done and demonstrated: enhancing left cord lesion at C4-C5 most compatible with acute demyelination.  - neurology follow up noted   - 5 days of IV Solumedrol   - s/p bedside LP today   - f/u CSF stdies       Emily Smith DO [facility]  Cardiovascular Medicine  [address], [address]  Office: [phone]  Cell: [phone]

## 2021-05-07 NOTE — PROGRESS NOTE ADULT - SUBJECTIVE AND OBJECTIVE BOX
Chief Complaint:  Patient is a 40y old  Male who presents with a chief complaint of Anemia (02 May 2021 15:36)      Interval Events:   - Hb 8.6   - Denies abd pain, n/v/d/f/c, bloody BMs (melena, hematochezia)   - No significant sources of JAS on EGD/colonoscopy       Allergies:  No Known Allergies      Hospital Medications:  acetaminophen   Tablet .. 650 milliGRAM(s) Oral every 6 hours PRN  lactated ringers. 500 milliLiter(s) IV Continuous <Continuous>  pantoprazole  Injectable 40 milliGRAM(s) IV Push daily  polyethylene glycol 3350 17 Gram(s) Oral daily PRN      PMHX/PSHX:  Germinoma    Hemochromatosis        Family history:      ROS: As per HPI, 14-point ROS negative otherwise.    General:  No wt loss, fevers, chills, night sweats, fatigue,   Eyes:  Good vision, no reported pain  ENT:  No sore throat, pain, runny nose, dysphagia  CV:  No pain, palpitations, hypo/hypertension  Resp:  No dyspnea, cough, tachypnea, wheezing  GI:  See HPI  :  No pain, bleeding, incontinence, nocturia  Muscle:  No pain, weakness  Neuro:  No weakness, tingling, memory problems  Psych:  No fatigue, insomnia, mood problems, depression  Endocrine:  No polyuria, polydipsia, cold/heat intolerance  Heme:  No petechiae, ecchymosis, easy bruisability  Skin:  No rash, edema      PHYSICAL EXAM:   Vital Signs Last 24 Hrs  T(C): 36.3 (05 May 2021 09:03), Max: 36.8 (04 May 2021 13:22)  T(F): 97.4 (05 May 2021 09:03), Max: 98.2 (04 May 2021 13:22)  HR: 70 (05 May 2021 09:03) (59 - 74)  BP: 105/70 (05 May 2021 09:03) (100/66 - 113/77)  BP(mean): --  RR: 18 (05 May 2021 09:03) (18 - 18)  SpO2: 99% (05 May 2021 09:03) (96% - 99%)    GENERAL:  appears comfortable, no acute distress  HEENT:  NC/AT,  conjunctivae clear, sclera -anicteric  CHEST:  no increased effort  HEART:  Regular rate and rhythm  ABDOMEN:  Soft, non-tender, non-distended,  no masses ,no hepato-splenomegaly,   EXTREMITIES:  no cyanosis, clubbing or edema  SKIN:  No rash/erythema/ecchymoses/petechiae/wounds  NEURO:  Alert, oriented    LABS:                        8.6    2.44  )-----------( 112      ( 07 May 2021 07:04 )             30.9     05-07    139  |  103  |  13  ----------------------------<  167<H>  4.1   |  21<L>  |  1.09    Ca    9.9      07 May 2021 07:06  Phos  3.5     05-07  Mg     2.2     05-07    TPro  7.7  /  Alb  5.0  /  TBili  1.5<H>  /  DBili  x   /  AST  29  /  ALT  19  /  AlkPhos  61  05-07    LIVER FUNCTIONS - ( 07 May 2021 07:06 )  Alb: 5.0 g/dL / Pro: 7.7 g/dL / ALK PHOS: 61 U/L / ALT: 19 U/L / AST: 29 U/L / GGT: x           PT/INR - ( 06 May 2021 07:15 )   PT: 13.5 sec;   INR: 1.13 ratio         PTT - ( 06 May 2021 07:15 )  PTT:45.3 sec        Imaging:    < from: Upper Endoscopy (05.06.21 @ 12:15) >  Findings:       The examined esophagus was normal.       Multiple sessile benign appearing fundic gland polyps with no bleeding and no stigmata of        recent bleeding were found in the gastric body.       The exam of the stomach was otherwise normal.       The duodenal bulb, first portion of the duodenum and second portion of the duodenum were        normal. Biopsies were taken with a cold forceps for histology to rule out celiac disease.                                                                                                        Impression:          - Normal esophagus.                       - Multiple fundic gland polyps.                       - Normal duodenal bulb, first portion of the duodenum and second portion of                        the duodenum. Biopsied.             - No obvious source of JAS seen on EGD  Recommendation:      - Proceed to colonoscopy (no obvious source of JAS or GI bleeding seen on EGD)                       - Follow up path from duodenal biopsies    < end of copied text >        < from: Colonoscopy (05.06.21 @ 12:15) >  Findings:       A few small-mouthed diverticula were found in the ascending colon.       The exam was otherwise normal throughout the examined colon.       The terminal ileum appeared normal.                                                                                                        Impression:          - No obvious source of JAS seen.                       - Diverticulosis in the ascending colon.                       - The examined portion of the ileum was normal.                       - No specimens collected.  Recommendation:    - [facility].                       - Iron supplementation.                       - Follow up path from EGD duodenal biopsies                       - Can get capsule endoscopy as an outpatient if JAS persists                       - Repeat colonoscopy at age 44 for screening purposes.    < end of copied text >

## 2021-05-08 LAB
% GAMMA, URINE: 10.4 % — SIGNIFICANT CHANGE UP
24R-OH-CALCIDIOL SERPL-MCNC: 14.5 NG/ML — LOW (ref 30–80)
ALBUMIN 24H MFR UR ELPH: 17 % — SIGNIFICANT CHANGE UP
ALPHA1 GLOB 24H MFR UR ELPH: 33.1 % — SIGNIFICANT CHANGE UP
ALPHA2 GLOB 24H MFR UR ELPH: 23.5 % — SIGNIFICANT CHANGE UP
ANION GAP SERPL CALC-SCNC: 19 MMOL/L — HIGH (ref 5–17)
B-GLOBULIN 24H MFR UR ELPH: 16 % — SIGNIFICANT CHANGE UP
BUN SERPL-MCNC: 18 MG/DL — SIGNIFICANT CHANGE UP (ref 7–23)
CALCIUM SERPL-MCNC: 10.1 MG/DL — SIGNIFICANT CHANGE UP (ref 8.4–10.5)
CHLORIDE SERPL-SCNC: 102 MMOL/L — SIGNIFICANT CHANGE UP (ref 96–108)
CO2 SERPL-SCNC: 20 MMOL/L — LOW (ref 22–31)
COLLECT DURATION TIME UR: 24 HR — SIGNIFICANT CHANGE UP
CREAT SERPL-MCNC: 1.08 MG/DL — SIGNIFICANT CHANGE UP (ref 0.5–1.3)
GLUCOSE SERPL-MCNC: 140 MG/DL — HIGH (ref 70–99)
H PYLORI AG STL QL: SIGNIFICANT CHANGE UP
HCT VFR BLD CALC: 31.3 % — LOW (ref 39–50)
HGB BLD-MCNC: 8.5 G/DL — LOW (ref 13–17)
INTERPRETATION 24H UR IFE-IMP: SIGNIFICANT CHANGE UP
INTERPRETATION 24H UR IFE-IMP: SIGNIFICANT CHANGE UP
M PROTEIN 24H UR ELPH-MRATE: 0 MG/24HR — SIGNIFICANT CHANGE UP (ref 0–0)
M PROTEIN 24H UR ELPH-MRATE: 0 MG/DL — SIGNIFICANT CHANGE UP
MAGNESIUM SERPL-MCNC: 2.3 MG/DL — SIGNIFICANT CHANGE UP (ref 1.6–2.6)
MCHC RBC-ENTMCNC: 15.3 PG — LOW (ref 27–34)
MCHC RBC-ENTMCNC: 27.2 GM/DL — LOW (ref 32–36)
MCV RBC AUTO: 56.3 FL — LOW (ref 80–100)
NRBC # BLD: 0 /100 WBCS — SIGNIFICANT CHANGE UP (ref 0–0)
PHOSPHATE SERPL-MCNC: 4 MG/DL — SIGNIFICANT CHANGE UP (ref 2.5–4.5)
PLATELET # BLD AUTO: 140 K/UL — LOW (ref 150–400)
POTASSIUM SERPL-MCNC: 4 MMOL/L — SIGNIFICANT CHANGE UP (ref 3.5–5.3)
POTASSIUM SERPL-SCNC: 4 MMOL/L — SIGNIFICANT CHANGE UP (ref 3.5–5.3)
PROT ?TM UR-MCNC: 14 MG/DL — HIGH (ref 0–12)
PROT PATTERN 24H UR ELPH-IMP: SIGNIFICANT CHANGE UP
PROTEIN QUANT CALC, URINE: 105 MG/24 H — HIGH (ref 50–100)
RBC # BLD: 5.56 M/UL — SIGNIFICANT CHANGE UP (ref 4.2–5.8)
RBC # FLD: 32.1 % — HIGH (ref 10.3–14.5)
SODIUM SERPL-SCNC: 141 MMOL/L — SIGNIFICANT CHANGE UP (ref 135–145)
TOTAL VOLUME - 24 HOUR: 750 ML — SIGNIFICANT CHANGE UP
URINE CREATININE CALCULATION: 0.8 G/24 H — LOW (ref 1–2)
WBC # BLD: 8.15 K/UL — SIGNIFICANT CHANGE UP (ref 3.8–10.5)
WBC # FLD AUTO: 8.15 K/UL — SIGNIFICANT CHANGE UP (ref 3.8–10.5)

## 2021-05-08 PROCEDURE — 99233 SBSQ HOSP IP/OBS HIGH 50: CPT | Mod: GC

## 2021-05-08 RX ORDER — ERGOCALCIFEROL 1.25 MG/1
50000 CAPSULE ORAL
Refills: 0 | Status: DISCONTINUED | OUTPATIENT
Start: 2021-05-08 | End: 2021-05-11

## 2021-05-08 RX ORDER — FERROUS SULFATE 325(65) MG
325 TABLET ORAL
Refills: 0 | Status: DISCONTINUED | OUTPATIENT
Start: 2021-05-08 | End: 2021-05-11

## 2021-05-08 RX ORDER — CHOLECALCIFEROL (VITAMIN D3) 125 MCG
50000 CAPSULE ORAL
Refills: 0 | Status: DISCONTINUED | OUTPATIENT
Start: 2021-05-08 | End: 2021-05-08

## 2021-05-08 RX ADMIN — ERGOCALCIFEROL 50000 UNIT(S): 1.25 CAPSULE ORAL at 13:08

## 2021-05-08 RX ADMIN — Medication 116 MILLIGRAM(S): at 21:07

## 2021-05-08 RX ADMIN — Medication 325 MILLIGRAM(S): at 17:51

## 2021-05-08 NOTE — PROGRESS NOTE ADULT - SUBJECTIVE AND OBJECTIVE BOX
DATE OF SERVICE: 05-08-21 @ 23:05    Patient is a 40y old  Male who presents with a chief complaint of Anemia (07 May 2021 07:24)      INTERVAL HISTORY: feels well     REVIEW OF SYSTEMS:  CONSTITUTIONAL: No weakness  EYES/ENT: No visual changes;  No throat pain   NECK: No pain or stiffness  RESPIRATORY: No cough, wheezing; No shortness of breath  CARDIOVASCULAR: No chest pain or palpitations  GASTROINTESTINAL: No abdominal  pain. No nausea, vomiting, or hematemesis  GENITOURINARY: No dysuria, frequency or hematuria  NEUROLOGICAL: No stroke like symptoms  SKIN: No rashes      PHYSICAL EXAM:  T(C): 36.8 (05-08-21 @ 20:36), Max: 36.8 (05-08-21 @ 09:35)  HR: 73 (05-08-21 @ 20:36) (62 - 77)  BP: 108/63 (05-08-21 @ 20:36) (102/65 - 111/58)  RR: 18 (05-08-21 @ 20:36) (18 - 18)  SpO2: 95% (05-08-21 @ 20:36) (95% - 99%)  Wt(kg): --  I&O's Summary    07 May 2021 07:01  -  08 May 2021 07:00  --------------------------------------------------------  IN: 780 mL / OUT: 0 mL / NET: 780 mL    08 May 2021 07:01  -  08 May 2021 23:05  --------------------------------------------------------  IN: 380 mL / OUT: 0 mL / NET: 380 mL          Appearance: In no distress	  HEENT:    PERRL, EOMI	  Cardiovascular:  S1 S2, No JVD  Respiratory: Lungs clear to auscultation	  Gastrointestinal:  Soft, Non-tender, + BS	  Vascularature:  No edema of LE  Psychiatric: Appropriate affect   Neuro: no acute focal deficits                               8.5    8.15  )-----------( 140      ( 08 May 2021 07:24 )             31.3     05-08    141  |  102  |  18  ----------------------------<  140<H>  4.0   |  20<L>  |  1.08    Ca    10.1      08 May 2021 07:23  Phos  4.0     05-08  Mg     2.3     05-08    TPro  7.7  /  Alb  5.0  /  TBili  1.5<H>  /  DBili  x   /  AST  29  /  ALT  19  /  AlkPhos  61  05-07        Labs personally reviewed      ASSESSMENT/PLAN: 	  Mr. Duran is a 39 y/o man with hx germinoma (brain) s/p chemo and radiation 2000, hx hemochromatosis requiring phlebotomies, has not followed up with doctors in 3 years presenting for L leg weakness/worsening foot drop pending MRI brain/spine to r/o cord compression/recurrence of germinoma, found to be microcytic anemic hb 6.1 s/p 1U PRBC 5/2 - iron panel concerning for GI malignancy/bleed vs myelodysplastic syndrome vs thalassemia (+ family hx).       Problem/Plan - 1:   ·  Problem: SOB.  Plan: Overall weakness and SOB 2/2 significant anemia of 6.1  - Transfuse PRBC to keep hb >7  - pending GI workup with egd/colonoscopy  - EKG with NSR no ischemic changes    Problem/Plan - 2:  ·  Problem: Anemia.  Plan: - admitted with hb 6.1  - clarified hemochromatosis Hx with PCP, never had anemia prior from it  - on the contrary hx hemochromatosis requiring phlebotomy every month for few years, last done ~3 years ago per pt.   - etiology of present anemia: denies melena/ hematochezia no hemoptysis/hematemesis, no hematuria. He is FOBT positive  - ? chronic slow GI blood loss anemia over time   - CT abd with moderate splenomegaly  -Iron panel with severe iron def anemia   - Heme/onc recs and follow up appreciated, ?outpt bone marrow biopsy  - EGD/ Colonoscopy with no obvious source of bleed, f/u biopsies     Problem/Plan - 3:  ·  Problem: Germinoma.  Plan: - hx germinoma (brain) dx in 2000 s/p chemo/radiation in 8980-4693. dx when he presented with L foot drop/weakness.   -pending CT A/P malignancy workup   - MRI head Increased signal intensity along the dorsal medulla without enhancement is nonspecific in appearance. Increasing number of foci of elevated signal intensity within the subcortical and periventricular white matter. Differential diagnosis includes ischemic, infectious/inflammatory and demyelinating processes.   - Heme/onc follow up     Problem/Plan - 4:  ·  Problem: Hemochromatosis.  Plan: - hx hemochromatosis requiring monthly phlebotomies (confirmed with PCP Dr. Ponce) - last phlebotomy 3 months ago  plan  - iron panel consistent with severe Iron def anemia, suspicious for blood loss anemia   - heme rec MRI liver and genetic testing for hemachromatosis   - s/p LP 5/7    Problem/Plan - 5:  ·  Problem: Leukopenia.  Plan: leukopenia with wbc 3.11. diff wnl.   - possible bone marrow suppression vs malignancy. afebrile, no infection sx including congestion/cough/fevers/chills/diarrhea. Less likely infection.   plan  -  daily CBC  - f/u heme onc recs    Problem/Plan - 6:  Problem: Fasciculation of tongue. Plan: - fasciulation of tongue observed on exam  - etiology: benign    Problem/Plan - 7:  ·  Problem: Nystagmus.  Plan: - horizontal nystagmus with lateral eye movements - pt states has had before, is not sure cause.   - etiology: cerebellum lesion though no dysmetria and no dyskinesia.     Problem/Plan - 8:  ·  Problem: Abnormal MRI brain.  Plan: MRI head Increased signal intensity along the dorsal medulla without enhancement is nonspecific in appearance. Increasing number of foci of elevated signal intensity within the subcortical and periventricular white matter. Differential diagnosis includes ischemic, infectious/inflammatory and demyelinating processes.   -MR Thoracic and Cervical Spine w/wo IV Cont were done and demonstrated: enhancing left cord lesion at C4-C5 most compatible with acute demyelination.  - neurology follow up noted   - 5 days of IV Solumedrol   - s/p bedside LP 5/7   - appears LE weakness on presentation likely 2/2 MS        Alejandro Smith DO [facility]  Cardiovascular Medicine  [address], [address]  Office: [phone]  Cell: [phone]

## 2021-05-08 NOTE — PROGRESS NOTE ADULT - PROBLEM SELECTOR PLAN 8
- horizontal nystagmus with lateral eye movements - pt states has had before, is not sure cause.   - etiology: cerebellum lesion though no dysmetria and no dyskinesia.    #tongue fasciculations  - fasciulation of tongue observed on exam  - etiology: benign, ALS - horizontal nystagmus with lateral eye movements - pt states has had before, is not sure cause.   - etiology: cerebellum lesion though no dysmetria and no dyskinesia.    #tongue fasciculations  - fasciulation of tongue observed on exam  - etiology: benign, ALS    #Vitamin D deficiency, 14.2  -starting repletion with cholecalciferol 50K U weekly - horizontal nystagmus with lateral eye movements - pt states has had before, is not sure cause.   - etiology: cerebellum lesion though no dysmetria and no dyskinesia.    #tongue fasciculations  - fasciulation of tongue observed on exam  - etiology: benign, ALS    #Vitamin D deficiency, 14.2  -starting repletion with ergocalciferol 50K U weekly

## 2021-05-08 NOTE — PROGRESS NOTE ADULT - PROBLEM SELECTOR PLAN 1
- admitted with hb 6.1 (f/u baseline from PCP), MCV 50.7. RDW 22.9. smear with moderate hypochromia, target cells.  s/p 2U pRBC since admission.   - severe JAS with ferritin 2. HIV negative. Celiac lab negative (anti transglutaminase neg, IgM elevated). parvovirus neg. retic low in setting of anemia suggesting bone marrow suppression (MDS). lead/heavy metal neg.   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   - CT AP 5/5  with moderate splenomegaly (15.3cm), otherwise normal. DDX of splenomegaly - possible myeloproliferative vs thalassemia. no infectious sx though IgM elevated.   - EGD colonoscopy without bleeding evidence/malignancy.   ETIOLOGY: likely MDS given negative work up above. Possible bleeding in small bowel - capsule study outpt.   PLAN: f/u heme/onc, GI recs - admitted with hb 6.1 (f/u baseline from PCP), MCV 50.7. RDW 22.9. smear with moderate hypochromia, target cells.  s/p 2U pRBC since admission.   - severe JAS with ferritin 2. HIV negative. Celiac lab negative (anti transglutaminase neg, IgM elevated). parvovirus neg. retic low in setting of anemia suggesting bone marrow suppression (MDS). lead/heavy metal neg.   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   - CT AP 5/5  with moderate splenomegaly (15.3cm), otherwise normal. DDX of splenomegaly - possible myeloproliferative vs thalassemia. no infectious sx though IgM elevated.   - EGD colonoscopy 5/7 without bleeding evidence/malignancy.   ETIOLOGY: likely MDS given negative work up above. Possible bleeding in small bowel - capsule study outpt.   PLAN: f/u heme/onc, GI recs

## 2021-05-08 NOTE — PROGRESS NOTE ADULT - PROBLEM SELECTOR PLAN 7
PANCYTOPENIA  leukopenia with wbc 3.11 on admission. diff wnl.   - possible MDS.   plan  -would benefit from BM bx   - CTM daily CBC  - f/u heme onc recs    #thrombocytopenia  - no heparin exposure. possible exposure to pantoprazole (was started on it for concern of GI bleed)  - discontinue pantoprazole as no evidence of GI bleed  -again, could be MDS.     #atrophic kidney  - likely 2/2 hx kidney stones requiring lithotripsy. CT AP this admission with non-obstructing right renal stone. Marked right renal scarring and atrophy. No hydronephrosis or mass.

## 2021-05-08 NOTE — PROGRESS NOTE ADULT - PROBLEM SELECTOR PLAN 5
- hx hemochromatosis requiring monthly phlebotomies (confirmed with PCP Dr. Ponce) - last phlebotomy 3 months ago  plan  - currently not in iron overload in setting of anemia above. CTM ferritin with blood transfusions.  - f/u genetic testing, out-patient MRI of liver to evaluate for iron deposition same name as above

## 2021-05-08 NOTE — PROGRESS NOTE ADULT - PROBLEM SELECTOR PLAN 3
- pt had L foot drop since germinoma in 2000 and had high steppage but over last week does not have strength for this and dragging L foot.    plan  - f/u MRI lumbar spine. f/u MRI brain - shows unchanged encephalomalacia and gliosis within the genuine of the corpus callosum. Increased signal intensity along the dorsal medulla without enhancement is nonspecific in appearance. Increasing number of foci of elevated signal intensity within the subcortical and periventricular white matter. Differential diagnosis includes ischemic, infectious/inflammatory and demyelinating processes. Cervical spine MRI with contrast may be valuable for further evaluation. New small inferior left frontal meningioma 3.5 x 8.4 mm. Lumbar spine MRI: Unremarkable.  - MRI cervical/thoracic spine - Enhancing left cord lesion at C4-C5 most compatible with acute demyelination. Less likely consideration is metastasis in this patient with history of germinoma. Multiple additional nonenhancing lesions throughout the spinal cord, most compatible with chronic demyelination. Correlate with symptomatology and CSF analysis. 2.0 cm right thyroid nodule. Recommend further evaluation by ultrasound.  - neuro - start solumedrol 1g daily 5/6 for 5d. s/p LP 5/7, f/u results. concern for MS. f/u further neuro recs. - pt had L foot drop since germinoma in 2000 and had high steppage but over last week does not have strength for this and dragging L foot.    plan  - f/u MRI lumbar spine. f/u MRI brain - shows unchanged encephalomalacia and gliosis within the genuine of the corpus callosum. Increased signal intensity along the dorsal medulla without enhancement is nonspecific in appearance. Increasing number of foci of elevated signal intensity within the subcortical and periventricular white matter. Differential diagnosis includes ischemic, infectious/inflammatory and demyelinating processes. Cervical spine MRI with contrast may be valuable for further evaluation. New small inferior left frontal meningioma 3.5 x 8.4 mm. Lumbar spine MRI: Unremarkable.  - MRI cervical/thoracic spine - Enhancing left cord lesion at C4-C5 most compatible with acute demyelination. Less likely consideration is metastasis in this patient with history of germinoma. Multiple additional nonenhancing lesions throughout the spinal cord, most compatible with chronic demyelination. Correlate with symptomatology and CSF analysis. 2.0 cm right thyroid nodule. Recommend further evaluation by ultrasound.  - neuro - start solumedrol 1g daily 5/6 for 5d.   -s/p LP 5/7, f/u results. concern for MS. f/u further neuro recs. - pt had L foot drop since germinoma in 2000 and had high steppage but over last week does not have strength for this and dragging L foot.    plan  - MRI lumbar spine with chronic demyelination.   - MRI brain - shows unchanged encephalomalacia and gliosis within the genuine of the corpus callosum. Increased signal intensity along the dorsal medulla without enhancement is nonspecific in appearance. Increasing number of foci of elevated signal intensity within the subcortical and periventricular white matter. Differential diagnosis includes ischemic, infectious/inflammatory and demyelinating processes. Cervical spine MRI with contrast may be valuable for further evaluation. New small inferior left frontal meningioma 3.5 x 8.4 mm. Lumbar spine MRI: Unremarkable.  - MRI cervical/thoracic spine - Enhancing left cord lesion at C4-C5 most compatible with acute demyelination. Less likely consideration is metastasis in this patient with history of germinoma. Multiple additional nonenhancing lesions throughout the spinal cord, most compatible with chronic demyelination. Correlate with symptomatology and CSF analysis. 2.0 cm right thyroid nodule. Recommend further evaluation by ultrasound.  - neuro - start solumedrol 1g daily 5/6 for 5d.   -s/p LP 5/7, f/u results. concern for MS. f/u further neuro recs.

## 2021-05-08 NOTE — PROGRESS NOTE ADULT - SUBJECTIVE AND OBJECTIVE BOX
PROGRESS NOTE:   Authored by Nikki Kimura, MD, Pager [phone] [facility], [id number] [facility]     Patient is a 40y old  Male who presents with a chief complaint of Anemia (07 May 2021 07:24)      SUBJECTIVE / OVERNIGHT EVENTS:    ADDITIONAL REVIEW OF SYSTEMS:    MEDICATIONS  (STANDING):  lidocaine 1% Injectable 30 milliLiter(s) Local Injection once  methylPREDNISolone sodium succinate IVPB 1000 milliGRAM(s) IV Intermittent daily    MEDICATIONS  (PRN):  acetaminophen   Tablet .. 650 milliGRAM(s) Oral every 6 hours PRN Mild Pain (1 - 3)  polyethylene glycol 3350 17 Gram(s) Oral daily PRN Constipation      CAPILLARY BLOOD GLUCOSE      POCT Blood Glucose.: 139 mg/dL (07 May 2021 13:51)  POCT Blood Glucose.: 144 mg/dL (07 May 2021 09:14)    I&O's Summary    07 May 2021 07:01  -  08 May 2021 07:00  --------------------------------------------------------  IN: 780 mL / OUT: 0 mL / NET: 780 mL        PHYSICAL EXAM:  Vital Signs Last 24 Hrs  T(C): 36.7 (08 May 2021 06:31), Max: 36.7 (07 May 2021 14:11)  T(F): 98.1 (08 May 2021 06:31), Max: 98.1 (08 May 2021 06:31)  HR: 67 (08 May 2021 06:31) (62 - 81)  BP: 111/58 (08 May 2021 06:31) (103/66 - 127/71)  BP(mean): --  RR: 18 (08 May 2021 06:31) (18 - 18)  SpO2: 97% (08 May 2021 06:31) (97% - 99%)    GENERAL: lying down, appears comfortable on RA  HEAD:  Atraumatic, Normocephalic  EYES: extra ocular movements intact, horizontal nystagmus on lateral gaze  ENT: Moist mucous membranes  NECK: Supple  CHEST/LUNG: clear to auscultation bilaterally, no crackles/wheeze/ronchi  HEART: regular rate and rhythm, no murmurs  ABDOMEN: nontender abdomen, +splenomegaly.  no rebound/guarding.   EXTREMITIES:  no edema. able to move all extremities. no rashes.   NERVOUS SYSTEM: A&O x3, CN2-12 - horizontal nystagmus with lateral gaze bilaterally but EOMI , fasciculations in tongue +, otherwise CN2-12 wnl. 5/5  str bilateral UE. no dysmetria, no dyskinesia. no tremors at rest or action. no pronator drift, neg romberg.   - GAIT: prominent L foot drop, dragging L foot with each step.   MSK: FROM all 4 extremities, full and equal strength  SKIN: No rashes or lesions      LABS:                        8.6    2.44  )-----------( 112      ( 07 May 2021 07:04 )             30.9     05-07    139  |  103  |  13  ----------------------------<  167<H>  4.1   |  21<L>  |  1.09    Ca    9.9      07 May 2021 07:06  Phos  3.5     05-07  Mg     2.2     05-07    TPro  7.7  /  Alb  5.0  /  TBili  1.5<H>  /  DBili  x   /  AST  29  /  ALT  19  /  AlkPhos  61  05-07              Culture - CSF with Gram Stain (collected 07 May 2021 11:40)  Source: .CSF CSF  Gram Stain (07 May 2021 13:35):    No polymorphonuclear cells seen per low power field    No organisms seen per oil power field    by cytocentrifuge        RADIOLOGY & ADDITIONAL TESTS:  Results Reviewed:   Imaging Personally Reviewed:  Electrocardiogram Personally Reviewed:    COORDINATION OF CARE:  Care Discussed with Consultants/Other Providers [Y/N]:  Prior or Outpatient Records Reviewed [Y/N]:   PROGRESS NOTE:   Authored by Nikki Kimura, MD, Pager [phone] [facility], [id number] [facility]     Patient is a 40y old  Male who presents with a chief complaint of Anemia (07 May 2021 07:24)      SUBJECTIVE / OVERNIGHT EVENTS: No acute events overnight. Pt seen and examined at bedside. Denies fever, chills, nausea, vomiting, cp, or sob. LLE weakness improving, walk is better.    ADDITIONAL REVIEW OF SYSTEMS:    MEDICATIONS  (STANDING):  lidocaine 1% Injectable 30 milliLiter(s) Local Injection once  methylPREDNISolone sodium succinate IVPB 1000 milliGRAM(s) IV Intermittent daily    MEDICATIONS  (PRN):  acetaminophen   Tablet .. 650 milliGRAM(s) Oral every 6 hours PRN Mild Pain (1 - 3)  polyethylene glycol 3350 17 Gram(s) Oral daily PRN Constipation      CAPILLARY BLOOD GLUCOSE      POCT Blood Glucose.: 139 mg/dL (07 May 2021 13:51)  POCT Blood Glucose.: 144 mg/dL (07 May 2021 09:14)    I&O's Summary    07 May 2021 07:01  -  08 May 2021 07:00  --------------------------------------------------------  IN: 780 mL / OUT: 0 mL / NET: 780 mL        PHYSICAL EXAM:  Vital Signs Last 24 Hrs  T(C): 36.7 (08 May 2021 06:31), Max: 36.7 (07 May 2021 14:11)  T(F): 98.1 (08 May 2021 06:31), Max: 98.1 (08 May 2021 06:31)  HR: 67 (08 May 2021 06:31) (62 - 81)  BP: 111/58 (08 May 2021 06:31) (103/66 - 127/71)  BP(mean): --  RR: 18 (08 May 2021 06:31) (18 - 18)  SpO2: 97% (08 May 2021 06:31) (97% - 99%)    GENERAL: lying down, appears comfortable on RA  HEAD:  Atraumatic, Normocephalic  EYES: extra ocular movements intact, horizontal nystagmus on lateral gaze  ENT: Moist mucous membranes  NECK: Supple  CHEST/LUNG: clear to auscultation bilaterally, no crackles/wheeze/ronchi  HEART: regular rate and rhythm, no murmurs  ABDOMEN: nontender abdomen, +splenomegaly.  no rebound/guarding.   EXTREMITIES:  no edema. able to move all extremities. no rashes.   NERVOUS SYSTEM: A&O x3, CN2-12 - horizontal nystagmus with lateral gaze bilaterally but EOMI , fasciculations in tongue +, otherwise CN2-12 wnl. 5/5  str bilateral UE. no dysmetria, no dyskinesia. no tremors at rest or action. no pronator drift, neg romberg.   - GAIT: prominent L foot drop, dragging L foot with each step.   MSK: FROM all 4 extremities, full and equal strength  SKIN: No rashes or lesions      LABS:                        8.6    2.44  )-----------( 112      ( 07 May 2021 07:04 )             30.9     05-07    139  |  103  |  13  ----------------------------<  167<H>  4.1   |  21<L>  |  1.09    Ca    9.9      07 May 2021 07:06  Phos  3.5     05-07  Mg     2.2     05-07    TPro  7.7  /  Alb  5.0  /  TBili  1.5<H>  /  DBili  x   /  AST  29  /  ALT  19  /  AlkPhos  61  05-07              Culture - CSF with Gram Stain (collected 07 May 2021 11:40)  Source: .CSF CSF  Gram Stain (07 May 2021 13:35):    No polymorphonuclear cells seen per low power field    No organisms seen per oil power field    by cytocentrifuge        RADIOLOGY & ADDITIONAL TESTS:  Results Reviewed:   Imaging Personally Reviewed:  Electrocardiogram Personally Reviewed:    COORDINATION OF CARE:  Care Discussed with Consultants/Other Providers [Y/N]:  Prior or Outpatient Records Reviewed [Y/N]:

## 2021-05-08 NOTE — PROGRESS NOTE ADULT - PROBLEM SELECTOR PLAN 4
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 dx when he presented with L foot drop/weakness.   - used to see neuro at [facility], lost to follow up for last few years (pt says because of "pandemic")  plan  - no recurrence on MRI

## 2021-05-08 NOTE — PROGRESS NOTE ADULT - ATTENDING COMMENTS
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  Seen at bedside today. No acute events. Reports marked improvement in lower extremity neurologic symptoms.  1) Chronic demyelinating syndrome with concern for MS - s/p LP 5/7, f/u CSF studies; cont solumedrol till 5/10. appreciate ongoing neuro recs.  2) Anemia secondary to severe iron deficiency - s/p 2u PRBC, EGD/colonoscopy with no acute findings, and IV iron. Stable. Trend daily. Start FESO4.  3) Pancytopenia - stable; will need outpatient heme/onc followup for biopsy given hx of malignancy.  4) Vit D deficiency - On Qweekly ergocalciferol.

## 2021-05-08 NOTE — PROGRESS NOTE ADULT - ASSESSMENT
Mr. Duran is a 39 y/o man with hx germinoma (brain) s/p chemo and radiation 2000, hx hemochromatosis requiring phlebotomies, has not followed up with doctors in 3 years presenting for L leg weakness/worsening foot drop pending MRI brain/spine to r/o cord compression/recurrence of germinoma, found to be microcytic anemic hb 6.1 MCV 50s, severe JAS with ferritin 2, beta thalassemia minor, low factor 8, EGD and colonoscopy without bleed. possible MDS. also found to have acute on chronic demyelinating lesions in spinal cord - s/p LP 5/7, possibly MS.

## 2021-05-08 NOTE — PROGRESS NOTE ADULT - PROBLEM SELECTOR PLAN 2
- hb electropheresis w beta thalassemia minor (hb A 95.1, hb A2 elevated 4.9) - unlikely to explain anemia.   - clotting factor lvls - factor 8 23, factor 9 73. von Willebrand factor activity wnl - no hx bleeding per pt. unclear etiology, unlikely to explain anemia  - f/u heme onc recs - hb electrophoresis w beta thalassemia minor (hb A 95.1, hb A2 elevated 4.9) - unlikely to explain anemia.   - clotting factor lvls - factor 8 23, factor 9 73. von Willebrand factor activity wnl - no hx bleeding per pt. unclear etiology, unlikely to explain anemia  - f/u heme onc recs

## 2021-05-09 LAB
ANION GAP SERPL CALC-SCNC: 14 MMOL/L — SIGNIFICANT CHANGE UP (ref 5–17)
APTT BLD: 38.4 SEC — HIGH (ref 27.5–35.5)
BUN SERPL-MCNC: 22 MG/DL — SIGNIFICANT CHANGE UP (ref 7–23)
CALCIUM SERPL-MCNC: 10.2 MG/DL — SIGNIFICANT CHANGE UP (ref 8.4–10.5)
CHLORIDE SERPL-SCNC: 104 MMOL/L — SIGNIFICANT CHANGE UP (ref 96–108)
CO2 SERPL-SCNC: 25 MMOL/L — SIGNIFICANT CHANGE UP (ref 22–31)
CREAT SERPL-MCNC: 1.01 MG/DL — SIGNIFICANT CHANGE UP (ref 0.5–1.3)
GLUCOSE SERPL-MCNC: 146 MG/DL — HIGH (ref 70–99)
HCT VFR BLD CALC: 29 % — LOW (ref 39–50)
HGB BLD-MCNC: 8.1 G/DL — LOW (ref 13–17)
INR BLD: 1.04 RATIO — SIGNIFICANT CHANGE UP (ref 0.88–1.16)
MAGNESIUM SERPL-MCNC: 2.3 MG/DL — SIGNIFICANT CHANGE UP (ref 1.6–2.6)
MCHC RBC-ENTMCNC: 15.6 PG — LOW (ref 27–34)
MCHC RBC-ENTMCNC: 27.9 GM/DL — LOW (ref 32–36)
MCV RBC AUTO: 56 FL — LOW (ref 80–100)
NRBC # BLD: 0 /100 WBCS — SIGNIFICANT CHANGE UP (ref 0–0)
PHOSPHATE SERPL-MCNC: 4.2 MG/DL — SIGNIFICANT CHANGE UP (ref 2.5–4.5)
PLATELET # BLD AUTO: 136 K/UL — LOW (ref 150–400)
POTASSIUM SERPL-MCNC: 5.2 MMOL/L — SIGNIFICANT CHANGE UP (ref 3.5–5.3)
POTASSIUM SERPL-SCNC: 5.2 MMOL/L — SIGNIFICANT CHANGE UP (ref 3.5–5.3)
PROT CSF-MCNC: 30 MG/DL — SIGNIFICANT CHANGE UP (ref 15–45)
PROTHROM AB SERPL-ACNC: 12.5 SEC — SIGNIFICANT CHANGE UP (ref 10.6–13.6)
RBC # BLD: 5.18 M/UL — SIGNIFICANT CHANGE UP (ref 4.2–5.8)
RBC # FLD: 32.4 % — HIGH (ref 10.3–14.5)
SARS-COV-2 RNA SPEC QL NAA+PROBE: SIGNIFICANT CHANGE UP
SODIUM SERPL-SCNC: 143 MMOL/L — SIGNIFICANT CHANGE UP (ref 135–145)
WBC # BLD: 10.32 K/UL — SIGNIFICANT CHANGE UP (ref 3.8–10.5)
WBC # FLD AUTO: 10.32 K/UL — SIGNIFICANT CHANGE UP (ref 3.8–10.5)

## 2021-05-09 PROCEDURE — 99233 SBSQ HOSP IP/OBS HIGH 50: CPT | Mod: GC

## 2021-05-09 RX ADMIN — Medication 325 MILLIGRAM(S): at 17:46

## 2021-05-09 RX ADMIN — Medication 325 MILLIGRAM(S): at 05:39

## 2021-05-09 RX ADMIN — Medication 116 MILLIGRAM(S): at 21:48

## 2021-05-09 NOTE — PROGRESS NOTE ADULT - PROBLEM SELECTOR PLAN 2
- hb electrophoresis w beta thalassemia minor (hb A 95.1, hb A2 elevated 4.9) - unlikely to explain anemia.   - clotting factor lvls - factor 8 23, factor 9 73. von Willebrand factor activity wnl - no hx bleeding per pt. unclear etiology, unlikely to explain anemia  - f/u heme onc recs

## 2021-05-09 NOTE — PROGRESS NOTE ADULT - PROBLEM SELECTOR PLAN 3
- pt had L foot drop since germinoma in 2000 and had high steppage but over last week does not have strength for this and dragging L foot.    plan  - MRI lumbar spine with chronic demyelination.   - MRI brain - shows unchanged encephalomalacia and gliosis within the genuine of the corpus callosum. Increased signal intensity along the dorsal medulla without enhancement is nonspecific in appearance. Increasing number of foci of elevated signal intensity within the subcortical and periventricular white matter. Differential diagnosis includes ischemic, infectious/inflammatory and demyelinating processes. Cervical spine MRI with contrast may be valuable for further evaluation. New small inferior left frontal meningioma 3.5 x 8.4 mm. Lumbar spine MRI: Unremarkable.  - MRI cervical/thoracic spine - Enhancing left cord lesion at C4-C5 most compatible with acute demyelination. Less likely consideration is metastasis in this patient with history of germinoma. Multiple additional nonenhancing lesions throughout the spinal cord, most compatible with chronic demyelination. Correlate with symptomatology and CSF analysis. 2.0 cm right thyroid nodule. Recommend further evaluation by ultrasound.  - neuro - start solumedrol 1g daily 5/6 for 5d.   -s/p LP 5/7, f/u results. concern for MS. f/u further neuro recs.

## 2021-05-09 NOTE — PROGRESS NOTE ADULT - ASSESSMENT
Mr. Duran is a 41 y/o man with hx germinoma (brain) s/p chemo and radiation 2000, hx hemochromatosis requiring phlebotomies, has not followed up with doctors in 3 years presenting for L leg weakness/worsening foot drop pending MRI brain/spine to r/o cord compression/recurrence of germinoma, found to be microcytic anemic hb 6.1 MCV 50s, severe JAS with ferritin 2, beta thalassemia minor, low factor 8, EGD and colonoscopy without bleed. possible MDS. also found to have acute on chronic demyelinating lesions in spinal cord - s/p LP 5/7, possibly MS.

## 2021-05-09 NOTE — PROGRESS NOTE ADULT - ATTENDING COMMENTS
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  Seen at bedside today. No acute events. Reports marked improvement in lower extremity neurologic symptoms.  1) Chronic demyelinating syndrome with concern for MS - s/p LP 5/7, f/u CSF studies; cont solumedrol till 5/10. Appreciate ongoing neuro recs.  2) Anemia secondary to severe iron deficiency - s/p 2u PRBC, EGD/colonoscopy with no acute findings, and IV iron. Stable. Trend daily. Start FESO4.  3) Pancytopenia - stable; will need outpatient heme/onc followup for biopsy given hx of malignancy.  4) Vit D deficiency - On Qweekly ergocalciferol.   Dispo: likely tomorrow to home PT if no further inpatient workup planned

## 2021-05-09 NOTE — PROGRESS NOTE ADULT - PROBLEM SELECTOR PLAN 4
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 dx when he presented with L foot drop/weakness.   - used to see neuro at [facility], lost to follow up for last few years (pt says because of "pandemic")  plan  - no recurrence on MRI

## 2021-05-09 NOTE — PROGRESS NOTE ADULT - SUBJECTIVE AND OBJECTIVE BOX
DATE OF SERVICE: 05-09-21 @ 17:06    Patient is a 40y old  Male who presents with a chief complaint of Anemia (07 May 2021 07:24)      INTERVAL HISTORY: feels ok    REVIEW OF SYSTEMS:  CONSTITUTIONAL: No weakness  EYES/ENT: No visual changes;  No throat pain   NECK: No pain or stiffness  RESPIRATORY: No cough, wheezing; No shortness of breath  CARDIOVASCULAR: No chest pain or palpitations  GASTROINTESTINAL: No abdominal  pain. No nausea, vomiting, or hematemesis  GENITOURINARY: No dysuria, frequency or hematuria  NEUROLOGICAL: No stroke like symptoms  SKIN: No rashes         PHYSICAL EXAM:  T(C): 36.7 (05-09-21 @ 16:44), Max: 36.8 (05-08-21 @ 20:36)  HR: 67 (05-09-21 @ 16:44) (67 - 88)  BP: 107/67 (05-09-21 @ 16:44) (102/67 - 116/69)  RR: 18 (05-09-21 @ 16:44) (18 - 18)  SpO2: 96% (05-09-21 @ 16:44) (95% - 98%)  Wt(kg): --  I&O's Summary    08 May 2021 07:01  -  09 May 2021 07:00  --------------------------------------------------------  IN: 740 mL / OUT: 0 mL / NET: 740 mL    09 May 2021 07:01  -  09 May 2021 17:06  --------------------------------------------------------  IN: 500 mL / OUT: 0 mL / NET: 500 mL          Appearance: In no distress	  HEENT:    PERRL, EOMI	  Cardiovascular:  S1 S2, No JVD  Respiratory: Lungs clear to auscultation	  Gastrointestinal:  Soft, Non-tender, + BS	  Vascularature:  No edema of LE  Psychiatric: Appropriate affect   Neuro: no acute focal deficits                               8.1    10.32 )-----------( 136      ( 09 May 2021 06:40 )             29.0     05-09    143  |  104  |  22  ----------------------------<  146<H>  5.2   |  25  |  1.01    Ca    10.2      09 May 2021 06:40  Phos  4.2     05-09  Mg     2.3     05-09          Labs personally reviewed      ASSESSMENT/PLAN: 	  Mr. Duran is a 41 y/o man with hx germinoma (brain) s/p chemo and radiation 2000, hx hemochromatosis requiring phlebotomies, has not followed up with doctors in 3 years presenting for L leg weakness/worsening foot drop pending MRI brain/spine to r/o cord compression/recurrence of germinoma, found to be microcytic anemic hb 6.1 s/p 1U PRBC 5/2 - iron panel concerning for GI malignancy/bleed vs myelodysplastic syndrome vs thalassemia (+ family hx).       Problem/Plan - 1:   ·  Problem: SOB.  Plan: Overall weakness and SOB 2/2 significant anemia of 6.1  - Transfuse PRBC to keep hb >7  - pending GI workup with egd/colonoscopy  - EKG with NSR no ischemic changes    Problem/Plan - 2:  ·  Problem: Anemia.  Plan: - admitted with hb 6.1  - clarified hemochromatosis Hx with PCP, never had anemia prior from it  - on the contrary hx hemochromatosis requiring phlebotomy every month for few years, last done ~3 years ago per pt.   - etiology of present anemia: denies melena/ hematochezia no hemoptysis/hematemesis, no hematuria. He is FOBT positive  - ? chronic slow GI blood loss anemia over time   - CT abd with moderate splenomegaly  -Iron panel with severe iron def anemia   - Heme/onc recs and follow up appreciated, ?outpt bone marrow biopsy  - EGD/ Colonoscopy with no obvious source of bleed, f/u biopsies     Problem/Plan - 3:  ·  Problem: Germinoma.  Plan: - hx germinoma (brain) dx in 2000 s/p chemo/radiation in 5420-6162. dx when he presented with L foot drop/weakness.   -pending CT A/P malignancy workup   - MRI head Increased signal intensity along the dorsal medulla without enhancement is nonspecific in appearance. Increasing number of foci of elevated signal intensity within the subcortical and periventricular white matter. Differential diagnosis includes ischemic, infectious/inflammatory and demyelinating processes.   - Heme/onc follow up     Problem/Plan - 4:  ·  Problem: Hemochromatosis.  Plan: - hx hemochromatosis requiring monthly phlebotomies (confirmed with PCP Dr. Ponce) - last phlebotomy 3 months ago  plan  - iron panel consistent with severe Iron def anemia, suspicious for blood loss anemia   - heme rec MRI liver and genetic testing for hemachromatosis   - s/p LP 5/7    Problem/Plan - 5:  ·  Problem: Leukopenia.  Plan: leukopenia with wbc 3.11. diff wnl.   - possible bone marrow suppression vs malignancy. afebrile, no infection sx including congestion/cough/fevers/chills/diarrhea. Less likely infection.   plan  -  daily CBC  - f/u heme onc recs    Problem/Plan - 6:  Problem: Fasciculation of tongue. Plan: - fasciulation of tongue observed on exam  - etiology: benign    Problem/Plan - 7:  ·  Problem: Nystagmus.  Plan: - horizontal nystagmus with lateral eye movements - pt states has had before, is not sure cause.   - etiology: cerebellum lesion though no dysmetria and no dyskinesia.     Problem/Plan - 8:  ·  Problem: Abnormal MRI brain.  Plan: MRI head Increased signal intensity along the dorsal medulla without enhancement is nonspecific in appearance. Increasing number of foci of elevated signal intensity within the subcortical and periventricular white matter. Differential diagnosis includes ischemic, infectious/inflammatory and demyelinating processes.   -MR Thoracic and Cervical Spine w/wo IV Cont were done and demonstrated: enhancing left cord lesion at C4-C5 most compatible with acute demyelination.  - neurology follow up noted   - 5 days of IV Solumedrol   - s/p bedside LP 5/7   - appears LE weakness on presentation likely 2/2 MS        Alejandro Smith DO [facility]  Cardiovascular Medicine  [address], [address]  Office: [phone]  Cell: [phone]

## 2021-05-09 NOTE — PROGRESS NOTE ADULT - PROBLEM SELECTOR PLAN 8
- horizontal nystagmus with lateral eye movements - pt states has had before, is not sure cause.   - etiology: cerebellum lesion though no dysmetria and no dyskinesia.    #tongue fasciculations  - fasciulation of tongue observed on exam  - etiology: benign, ALS    #Vitamin D deficiency, 14.2  -starting repletion with ergocalciferol 50K U weekly

## 2021-05-09 NOTE — PROGRESS NOTE ADULT - PROBLEM SELECTOR PLAN 1
- admitted with hb 6.1 (f/u baseline from PCP), MCV 50.7. RDW 22.9. smear with moderate hypochromia, target cells.  s/p 2U pRBC since admission.   - severe JAS with ferritin 2. HIV negative. Celiac lab negative (anti transglutaminase neg, IgM elevated). parvovirus neg. retic low in setting of anemia suggesting bone marrow suppression (MDS). lead/heavy metal neg.   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   - CT AP 5/5  with moderate splenomegaly (15.3cm), otherwise normal. DDX of splenomegaly - possible myeloproliferative vs thalassemia. no infectious sx though IgM elevated.   - EGD colonoscopy 5/7 without bleeding evidence/malignancy.   ETIOLOGY: likely MDS given negative work up above. Possible bleeding in small bowel - capsule study outpt.   PLAN: f/u heme/onc, GI recs

## 2021-05-09 NOTE — PROGRESS NOTE ADULT - SUBJECTIVE AND OBJECTIVE BOX
Precious Valles PGY1    Patient is a 40y old  Male who presents with a chief complaint of Anemia (07 May 2021 07:24)      SUBJECTIVE / OVERNIGHT EVENTS:    MEDICATIONS  (STANDING):  ergocalciferol 20635 Unit(s) Oral <User Schedule>  ferrous    sulfate 325 milliGRAM(s) Oral two times a day  lidocaine 1% Injectable 30 milliLiter(s) Local Injection once  methylPREDNISolone sodium succinate IVPB 1000 milliGRAM(s) IV Intermittent daily    MEDICATIONS  (PRN):  acetaminophen   Tablet .. 650 milliGRAM(s) Oral every 6 hours PRN Mild Pain (1 - 3)  polyethylene glycol 3350 17 Gram(s) Oral daily PRN Constipation      CAPILLARY BLOOD GLUCOSE        I&O's Summary    08 May 2021 07:01  -  09 May 2021 07:00  --------------------------------------------------------  IN: 740 mL / OUT: 0 mL / NET: 740 mL        Vital Signs Last 24 Hrs  T(C): 36.4 (09 May 2021 01:17), Max: 36.8 (08 May 2021 09:35)  T(F): 97.6 (09 May 2021 01:17), Max: 98.2 (08 May 2021 09:35)  HR: 70 (09 May 2021 01:17) (70 - 77)  BP: 102/67 (09 May 2021 01:17) (102/65 - 110/69)  BP(mean): --  RR: 18 (09 May 2021 01:17) (18 - 18)  SpO2: 96% (09 May 2021 01:17) (95% - 98%)    PHYSICAL EXAM:  GENERAL: lying down, appears comfortable on RA  HEAD:  Atraumatic, Normocephalic  EYES: extra ocular movements intact, horizontal nystagmus on lateral gaze  ENT: Moist mucous membranes  NECK: Supple  CHEST/LUNG: clear to auscultation bilaterally, no crackles/wheeze/ronchi  HEART: regular rate and rhythm, no murmurs  ABDOMEN: nontender abdomen, +splenomegaly.  no rebound/guarding.   EXTREMITIES:  no edema. able to move all extremities. no rashes.   NERVOUS SYSTEM: A&O x3, CN2-12 - horizontal nystagmus with lateral gaze bilaterally but EOMI , fasciculations in tongue +, otherwise CN2-12 wnl. 5/5  str bilateral UE. no dysmetria, no dyskinesia. no tremors at rest or action. no pronator drift, neg romberg.   - GAIT: prominent L foot drop, dragging L foot with each step.   MSK: FROM all 4 extremities, full and equal strength  SKIN: No rashes or lesions    LABS:                        8.5    8.15  )-----------( 140      ( 08 May 2021 07:24 )             31.3      05-08    141  |  102  |  18  ----------------------------<  140<H>  4.0   |  20<L>  |  1.08    Ca    10.1      08 May 2021 07:23  Phos  4.0     05-08  Mg     2.3     05-08                RADIOLOGY & ADDITIONAL TESTS:    Imaging Personally Reviewed:    Consultant(s) Notes Reviewed:      Care Discussed with Consultants/Other Providers:   Precious Bermanathy PGY1    Patient is a 40y old  Male who presents with a chief complaint of Anemia (07 May 2021 07:24)      SUBJECTIVE / OVERNIGHT EVENTS:  - overnight no events  - am - significantly improved gait, LLE weakness, nystagmus. no chest pain/sob. no new weakness. pt feels ready to go home tomorrow after last prednisolone dose.     MEDICATIONS  (STANDING):  ergocalciferol 36712 Unit(s) Oral <User Schedule>  ferrous    sulfate 325 milliGRAM(s) Oral two times a day  lidocaine 1% Injectable 30 milliLiter(s) Local Injection once  methylPREDNISolone sodium succinate IVPB 1000 milliGRAM(s) IV Intermittent daily    MEDICATIONS  (PRN):  acetaminophen   Tablet .. 650 milliGRAM(s) Oral every 6 hours PRN Mild Pain (1 - 3)  polyethylene glycol 3350 17 Gram(s) Oral daily PRN Constipation      CAPILLARY BLOOD GLUCOSE        I&O's Summary    08 May 2021 07:01  -  09 May 2021 07:00  --------------------------------------------------------  IN: 740 mL / OUT: 0 mL / NET: 740 mL        Vital Signs Last 24 Hrs  T(C): 36.4 (09 May 2021 01:17), Max: 36.8 (08 May 2021 09:35)  T(F): 97.6 (09 May 2021 01:17), Max: 98.2 (08 May 2021 09:35)  HR: 70 (09 May 2021 01:17) (70 - 77)  BP: 102/67 (09 May 2021 01:17) (102/65 - 110/69)  BP(mean): --  RR: 18 (09 May 2021 01:17) (18 - 18)  SpO2: 96% (09 May 2021 01:17) (95% - 98%)    PHYSICAL EXAM:  GENERAL: lying down, appears comfortable on RA  HEAD:  Atraumatic, Normocephalic  EYES: extra ocular movements intact, mild horizontal nystagmus on L lateral gaze   ENT: Moist mucous membranes  NECK: Supple  CHEST/LUNG: clear to auscultation bilaterally, no crackles/wheeze/ronchi  HEART: regular rate and rhythm, no murmurs  ABDOMEN: nontender abdomen, +splenomegaly.  no rebound/guarding.   EXTREMITIES:  no edema. able to move all extremities. no rashes.   NERVOUS SYSTEM: A&O x3, CN2-12 - mild horizonal nystagmus on L lateral gaze. improved weakness with foot drop on L leg with gait.   MSK: FROM all 4 extremities, full and equal strength  SKIN: No rashes or lesions    LABS:                        8.5    8.15  )-----------( 140      ( 08 May 2021 07:24 )             31.3      05-08    141  |  102  |  18  ----------------------------<  140<H>  4.0   |  20<L>  |  1.08    Ca    10.1      08 May 2021 07:23  Phos  4.0     05-08  Mg     2.3     05-08                RADIOLOGY & ADDITIONAL TESTS:    Imaging Personally Reviewed:    Consultant(s) Notes Reviewed:      Care Discussed with Consultants/Other Providers:

## 2021-05-10 DIAGNOSIS — E04.1 NONTOXIC SINGLE THYROID NODULE: ICD-10-CM

## 2021-05-10 DIAGNOSIS — D61.818 OTHER PANCYTOPENIA: ICD-10-CM

## 2021-05-10 DIAGNOSIS — D32.0 BENIGN NEOPLASM OF CEREBRAL MENINGES: ICD-10-CM

## 2021-05-10 DIAGNOSIS — G37.9 DEMYELINATING DISEASE OF CENTRAL NERVOUS SYSTEM, UNSPECIFIED: ICD-10-CM

## 2021-05-10 DIAGNOSIS — E55.9 VITAMIN D DEFICIENCY, UNSPECIFIED: ICD-10-CM

## 2021-05-10 LAB
ALBUMIN CSF-MCNC: 27.3 MG/DL — HIGH (ref 14–25)
ALBUMIN SERPL ELPH-MCNC: 5076 MG/DL — SIGNIFICANT CHANGE UP (ref 3500–5200)
CULTURE RESULTS: NO GROWTH — SIGNIFICANT CHANGE UP
HCT VFR BLD CALC: 30 % — LOW (ref 39–50)
HGB BLD-MCNC: 8.3 G/DL — LOW (ref 13–17)
IGG CSF-MCNC: 7.2 MG/DL — HIGH
IGG FLD-MCNC: 1128 MG/DL — SIGNIFICANT CHANGE UP (ref 610–1660)
IGG SYNTH RATE SER+CSF CALC-MRATE: 18.2 MG/DAY — HIGH
IGG/ALB CLEAR SER+CSF-RTO: 1.2 — HIGH
IGG/ALB CSF: 0.26 RATIO — HIGH
IGG/ALB SER: 0.22 RATIO — SIGNIFICANT CHANGE UP
MCHC RBC-ENTMCNC: 15.9 PG — LOW (ref 27–34)
MCHC RBC-ENTMCNC: 27.7 GM/DL — LOW (ref 32–36)
MCV RBC AUTO: 57.4 FL — LOW (ref 80–100)
NON-GYNECOLOGICAL CYTOLOGY STUDY: SIGNIFICANT CHANGE UP
NRBC # BLD: 0 /100 WBCS — SIGNIFICANT CHANGE UP (ref 0–0)
PLATELET # BLD AUTO: 118 K/UL — LOW (ref 150–400)
RBC # BLD: 5.23 M/UL — SIGNIFICANT CHANGE UP (ref 4.2–5.8)
RBC # FLD: 33.2 % — HIGH (ref 10.3–14.5)
SPECIMEN SOURCE: SIGNIFICANT CHANGE UP
SURGICAL PATHOLOGY STUDY: SIGNIFICANT CHANGE UP
TM INTERPRETATION: SIGNIFICANT CHANGE UP
WBC # BLD: 8.32 K/UL — SIGNIFICANT CHANGE UP (ref 3.8–10.5)
WBC # FLD AUTO: 8.32 K/UL — SIGNIFICANT CHANGE UP (ref 3.8–10.5)

## 2021-05-10 PROCEDURE — 99231 SBSQ HOSP IP/OBS SF/LOW 25: CPT

## 2021-05-10 PROCEDURE — 99233 SBSQ HOSP IP/OBS HIGH 50: CPT | Mod: GC

## 2021-05-10 RX ADMIN — Medication 325 MILLIGRAM(S): at 17:06

## 2021-05-10 RX ADMIN — Medication 325 MILLIGRAM(S): at 05:15

## 2021-05-10 RX ADMIN — Medication 116 MILLIGRAM(S): at 22:09

## 2021-05-10 NOTE — PROGRESS NOTE ADULT - SUBJECTIVE AND OBJECTIVE BOX
ARLIN YO  Male  MRN-[id number]    Interval:    - No acute events overnight  - Last day of steroids today  - Patient feels likely the strength is improving in the LLE.     VITAL SIGNS:  T(F): --  HR: --  BP: --  RR: --  SpO2: --    Exam:   MS: Oriented x3. Fluent. Follows crossed commands.   CN: VFF. EOMI. V1-V3 intact. Face symmetric. T/u midline.   Motor: LLE DF 4+/5, LLE HF 4+/5, LLE KF 4+/5, otherwise full strength throughout.   Sensory: Intact to LT throughout   Reflexes: 2+ throughout. Babinski absent bilaterally.   Coordination: Mild dysmetria on FNF on the left.   Gait: Deferred    LABS:                          8.3    8.32  )-----------( 118      ( 10 May 2021 07:33 )             30.0     05-09    143  |  104  |  22  ----------------------------<  146<H>  5.2   |  25  |  1.01    Ca    10.2      09 May 2021 06:40  Phos  4.2     05-09  Mg     2.3     05-09      PT/INR - ( 09 May 2021 06:40 )   PT: 12.5 sec;   INR: 1.04 ratio         PTT - ( 09 May 2021 06:40 )  PTT:38.4 sec    MEDICATIONS:   acetaminophen   Tablet .. 650 milliGRAM(s) Oral every 6 hours PRN  ergocalciferol 92488 Unit(s) Oral <User Schedule>  ferrous    sulfate 325 milliGRAM(s) Oral two times a day  lidocaine 1% Injectable 30 milliLiter(s) Local Injection once  methylPREDNISolone sodium succinate IVPB 1000 milliGRAM(s) IV Intermittent daily  polyethylene glycol 3350 17 Gram(s) Oral daily PRN          RADIOLOGY & ADDITIONAL STUDIES:

## 2021-05-10 NOTE — PROGRESS NOTE ADULT - PROBLEM SELECTOR PLAN 8
- horizontal nystagmus with lateral eye movements - pt states has had before, is not sure cause.   - etiology: cerebellum lesion though no dysmetria and no dyskinesia.    #tongue fasciculations  - fasciulation of tongue observed on exam  - etiology: benign, ALS    #Vitamin D deficiency, 14.2  -starting repletion with ergocalciferol 50K U weekly Incidental thyroid nodule seen on CT  - Will need outpatient f/u for u/s or monitoring, will sign out to PCP Dr. Ponce

## 2021-05-10 NOTE — PROGRESS NOTE ADULT - PROBLEM SELECTOR PLAN 1
- admitted with hb 6.1 (f/u baseline from PCP), MCV 50.7. RDW 22.9. smear with moderate hypochromia, target cells.  s/p 2U pRBC since admission.   - severe JAS with ferritin 2. HIV negative. Celiac lab negative (anti transglutaminase neg, IgM elevated). parvovirus neg. retic low in setting of anemia suggesting bone marrow suppression (MDS). lead/heavy metal neg.   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   - CT AP 5/5  with moderate splenomegaly (15.3cm), otherwise normal. DDX of splenomegaly - possible myeloproliferative vs thalassemia. no infectious sx though IgM elevated.   - EGD colonoscopy 5/7 without bleeding evidence/malignancy.   ETIOLOGY: likely MDS given negative work up above. Possible bleeding in small bowel - capsule study outpt.   PLAN: f/u heme/onc, GI recs Presenting with LLE weakness/foot drop likely acute on chronic demyelinating disease. Neuro events  by time and space, c/f MS.  Other ddx include transverse myelitis iso possible malignancy given pancytopenia.  LLE weakness improving with steroids.  - C/w IV solumedrol (5/5-10), last dose tonight  - F/u CSF studies: culture negative, flow cytometry with no diagnostic findings, electrophoresis shows mildly elevated IgG  - Per neuro, no further inpatient recs. F/u outpatient with MS specialist Dr. Wendy Galo within 2 weeks of discharge at [address] [place] [address] [phone].

## 2021-05-10 NOTE — PROGRESS NOTE ADULT - PROBLEM SELECTOR PLAN 5
- hx hemochromatosis requiring monthly phlebotomies (confirmed with PCP Dr. Ponce) - last phlebotomy 3 months ago  plan  - currently not in iron overload in setting of anemia above. CTM ferritin with blood transfusions.  - f/u genetic testing, out-patient MRI of liver to evaluate for iron deposition Small L inferior frontal meningioma (measuring 3.5 x 8.4 mm) seen on MR head 5/4  - Per neuro, appears benign  - Outpatient neuro f/u

## 2021-05-10 NOTE — DIETITIAN INITIAL EVALUATION ADULT. - ADD RECOMMEND
1) Continue current diet as tolerated. 2) RD to remain available and follow-up as medically appropriate.

## 2021-05-10 NOTE — DIETITIAN INITIAL EVALUATION ADULT. - CHIEF COMPLAINT
This is a "39 y/o man with hx germinoma (brain) s/p chemo and radiation 2000, hx hemochromatosis requiring phlebotomies, has not followed up with doctors in 3 years presenting for L leg weakness/worsening foot drop pending MRI brain/spine to r/o cord compression/recurrence of germinoma, found to be microcytic anemic hb 6.1 MCV 50s, severe JAS with ferritin 2, beta thalassemia minor, low factor 8, EGD and colonoscopy without bleed. possible MDS. also found to have acute on chronic demyelinating lesions in spinal cord - s/p LP 5/7, possibly MS"

## 2021-05-10 NOTE — PROGRESS NOTE ADULT - ATTENDING COMMENTS
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  Seen at bedside today. No acute events.   1) Chronic demyelinating syndrome with concern for MS -last day of stress dose steroids today; appreciate neuro input  2) Anemia secondary to severe iron deficiency - s/p 2u PRBC, EGD/colonoscopy with no acute findings, and IV iron. Stable. Trend daily. Cont FESO4.  3) Pancytopenia - stable; will need outpatient heme/onc followup for biopsy given hx of malignancy.  4) Vit D deficiency - On Qweekly ergocalciferol.   Dispo: likely tomorrow to home PT

## 2021-05-10 NOTE — PROGRESS NOTE ADULT - PROBLEM SELECTOR PLAN 3
- pt had L foot drop since germinoma in 2000 and had high steppage but over last week does not have strength for this and dragging L foot.    plan  - MRI lumbar spine with chronic demyelination.   - MRI brain - shows unchanged encephalomalacia and gliosis within the genuine of the corpus callosum. Increased signal intensity along the dorsal medulla without enhancement is nonspecific in appearance. Increasing number of foci of elevated signal intensity within the subcortical and periventricular white matter. Differential diagnosis includes ischemic, infectious/inflammatory and demyelinating processes. Cervical spine MRI with contrast may be valuable for further evaluation. New small inferior left frontal meningioma 3.5 x 8.4 mm. Lumbar spine MRI: Unremarkable.  - MRI cervical/thoracic spine - Enhancing left cord lesion at C4-C5 most compatible with acute demyelination. Less likely consideration is metastasis in this patient with history of germinoma. Multiple additional nonenhancing lesions throughout the spinal cord, most compatible with chronic demyelination. Correlate with symptomatology and CSF analysis. 2.0 cm right thyroid nodule. Recommend further evaluation by ultrasound.  - neuro - start solumedrol 1g daily 5/6 for 5d.   -s/p LP 5/7, f/u results. concern for MS. f/u further neuro recs. Pancytopenia, with leukopenia improved with steroids. C/f MDS. Stable.  - Outpatient f/u with heme/onc

## 2021-05-10 NOTE — PROGRESS NOTE ADULT - PROBLEM SELECTOR PLAN 4
- hx germinoma (brain, specific location need to obtain records from [facility]) dx in 2000 s/p chemo/radiation in 6938-1165. dx when he presented with L foot drop/weakness.   - used to see neuro at [facility], lost to follow up for last few years (pt says because of "pandemic")  plan  - no recurrence on MRI PTT prolonged to 40's, likely due to mild factor 8 and 9 deficiency.   - stable  - outpatient heme/onc f/u

## 2021-05-10 NOTE — DIETITIAN INITIAL EVALUATION ADULT. - PROBLEM SELECTOR PLAN 3
- hx germinoma (brain) dx in 2000 s/p chemo/radiation in 7002-8610. dx when he presented with L foot drop/weakness.   - used to see neuro at [facility], lost to follow up for last few years (pt says because of "pandemic")  plan  - MRI brain as above   - heme onc on board - f/u additional recs

## 2021-05-10 NOTE — PROGRESS NOTE ADULT - PROBLEM SELECTOR PLAN 6
- PTT prolonged to 43 on admission (INR and PT wnl). factor 8 deficiency.   - etiology: not on heparin. not on anticoagulants. possible von willebrand, hemophilia. not concerning for DIC. less likely cirrhosis given INR wnl, thrombycytopenia since yesterday, albumin wnl.   plan  - f/u heme/onc recs Hx hemochromatosis requiring monthly phlebotomies (confirmed with PCP Dr. Ponce), likely diagnosed with liver MR. Last phlebotomy 3 months ago. Unclear if primary or secondary.  - currently not in iron overload.  - will draw hematochromatosis genetic testing  - Outpatient f/u with heme/onc and out-patient MRI of liver to evaluate for iron deposition

## 2021-05-10 NOTE — PROGRESS NOTE ADULT - ASSESSMENT
40M w/ PMH of Brain Germinoma s/p chemo/radiation in 2000 presented w/ LLE weakness found to have cervical cord enchancing lesion and diffuse chronic T2 hyperintensities throughout his cerebrum.     MR brain w/ periventricular and subcortical T2 hyperintensities. No contrast enhancement.     MR cervical, thoracic, lumbar spine: enhancing L. cervical cord lesion at C4-C5. T2 hyperintnsities at T12-L1 extending to the tip of the conus without associated enchancement or expansion of the cord.     CSF non-inflammatory     Impression: LLE secondary to cervical cord inflamamtion. Etiology likely MS.     Plan:   [] Continue Solumedrol x 5 days (5/6-5/10)  [] Will f/u remainder CSF studies outpatient   [] f/u with MS specialist Dr. Wendy Galo within 2 weeks of discharge at [address] [place] [address] [phone]    Signing off.   Call back prn.

## 2021-05-10 NOTE — PROGRESS NOTE ADULT - SUBJECTIVE AND OBJECTIVE BOX
Patient is a 40y old  Male who presents with a chief complaint of Anemia (10 May 2021 09:21)      INTERVAL HISTORY: feels betterm pending dc home tomorrow     REVIEW OF SYSTEMS:   CONSTITUTIONAL: No weakness  EYES/ENT: No visual changes; No throat pain  Neck: No pain or stiffness  Respiratory: No cough, wheezing, No shortness of breath  CARDIOVASCULAR: no chest pain or palpitations  GASTROINTESTINAL: No abdominal pain, no nausea, vomiting or hematemesis  GENITOURINARY: No dysuria, frequency or hematuria  NEUROLOGICAL: No stroke like symptoms  SKIN: No rashes    	  MEDICATIONS:        PHYSICAL EXAM:  T(C): 36.5 (05-10-21 @ 13:10), Max: 36.7 (05-09-21 @ 16:44)  HR: 69 (05-10-21 @ 13:10) (65 - 69)  BP: 110/68 (05-10-21 @ 13:10) (105/62 - 110/69)  RR: 18 (05-10-21 @ 13:10) (18 - 18)  SpO2: 98% (05-10-21 @ 13:10) (95% - 98%)  Wt(kg): --  I&O's Summary    09 May 2021 07:01  -  10 May 2021 07:00  --------------------------------------------------------  IN: 550 mL / OUT: 0 mL / NET: 550 mL    10 May 2021 07:01  -  10 May 2021 13:58  --------------------------------------------------------  IN: 480 mL / OUT: 0 mL / NET: 480 mL    Appearance: In no distress	  HEENT:    PERRL, EOMI	  Cardiovascular:  S1 S2, No JVD  Respiratory: Lungs clear to auscultation	  Gastrointestinal:  Soft, Non-tender, + BS	  Vascularature:  No edema of LE  Psychiatric: Appropriate affect   Neuro: no acute focal deficits                         8.3    8.32  )-----------( 118      ( 10 May 2021 07:33 )             30.0     05-09    143  |  104  |  22  ----------------------------<  146<H>  5.2   |  25  |  1.01    Ca    10.2      09 May 2021 06:40  Phos  4.2     05-09  Mg     2.3     05-09  Labs personally reviewed    ASSESSMENT/PLAN: 	  Mr. Duran is a 41 y/o man with hx germinoma (brain) s/p chemo and radiation 2000, hx hemochromatosis requiring phlebotomies, has not followed up with doctors in 3 years presenting for L leg weakness/worsening foot drop pending MRI brain/spine to r/o cord compression/recurrence of germinoma, found to be microcytic anemic hb 6.1 s/p 1U PRBC 5/2 - iron panel concerning for GI malignancy/bleed vs myelodysplastic syndrome vs thalassemia (+ family hx).       Problem/Plan - 1:   ·  Problem: SOB.  Plan: Overall weakness and SOB 2/2 significant anemia of 6.1  - Transfuse PRBC to keep hb >7  - pending GI workup with egd/colonoscopy  - EKG with NSR no ischemic changes    Problem/Plan - 2:  ·  Problem: Anemia.  Plan: - admitted with hb 6.1  - clarified hemochromatosis Hx with PCP, never had anemia prior from it  - on the contrary hx hemochromatosis requiring phlebotomy every month for few years, last done ~3 years ago per pt.   - etiology of present anemia: denies melena/ hematochezia no hemoptysis/hematemesis, no hematuria. He is FOBT positive  - ? chronic slow GI blood loss anemia over time   - CT abd with moderate splenomegaly  -Iron panel with severe iron def anemia   - Heme/onc recs and follow up appreciated, ?outpt bone marrow biopsy  - EGD/ Colonoscopy with no obvious source of bleed, f/u biopsies     Problem/Plan - 3:  ·  Problem: Germinoma.  Plan: - hx germinoma (brain) dx in 2000 s/p chemo/radiation in 9533-4751. dx when he presented with L foot drop/weakness.   -pending CT A/P malignancy workup   - MRI head Increased signal intensity along the dorsal medulla without enhancement is nonspecific in appearance. Increasing number of foci of elevated signal intensity within the subcortical and periventricular white matter. Differential diagnosis includes ischemic, infectious/inflammatory and demyelinating processes.   - Heme/onc follow up     Problem/Plan - 4:  ·  Problem: Hemochromatosis.  Plan: - hx hemochromatosis requiring monthly phlebotomies (confirmed with PCP Dr. Ponce) - last phlebotomy 3 months ago  plan  - iron panel consistent with severe Iron def anemia, suspicious for blood loss anemia   - heme rec MRI liver and genetic testing for hemachromatosis   - s/p LP 5/7    Problem/Plan - 5:  ·  Problem: Leukopenia.  Plan: leukopenia with wbc 3.11. diff wnl.   - possible bone marrow suppression vs malignancy. afebrile, no infection sx including congestion/cough/fevers/chills/diarrhea. Less likely infection.   plan  -  daily CBC  - f/u heme onc recs    Problem/Plan - 6:  Problem: Fasciculation of tongue. Plan: - fasciulation of tongue observed on exam  - etiology: benign    Problem/Plan - 7:  ·  Problem: Nystagmus.  Plan: - horizontal nystagmus with lateral eye movements - pt states has had before, is not sure cause.   - etiology: cerebellum lesion though no dysmetria and no dyskinesia.     Problem/Plan - 8:  ·  Problem: Abnormal MRI brain.  Plan: MRI head Increased signal intensity along the dorsal medulla without enhancement is nonspecific in appearance. Increasing number of foci of elevated signal intensity within the subcortical and periventricular white matter. Differential diagnosis includes ischemic, infectious/inflammatory and demyelinating processes.   -MR Thoracic and Cervical Spine w/wo IV Cont were done and demonstrated: enhancing left cord lesion at C4-C5 most compatible with acute demyelination.  - neurology follow up noted   - 5 days of IV Solumedrol   - s/p bedside LP 5/7   - appears LE weakness on presentation likely 2/2 MS      pending dc home with home PT tomorrow      Emily LACEY-ROEL Smith DO [facility]  Cardiovascular Medicine  [address], [address]  Office: [phone]  Cell: [phone] Patient is a 40y old  Male who presents with a chief complaint of Anemia (10 May 2021 09:21)      INTERVAL HISTORY: feels betterm pending dc home tomorrow     REVIEW OF SYSTEMS:   CONSTITUTIONAL: No weakness  EYES/ENT: No visual changes; No throat pain  Neck: No pain or stiffness  Respiratory: No cough, wheezing, No shortness of breath  CARDIOVASCULAR: no chest pain or palpitations  GASTROINTESTINAL: No abdominal pain, no nausea, vomiting or hematemesis  GENITOURINARY: No dysuria, frequency or hematuria  NEUROLOGICAL: No stroke like symptoms  SKIN: No rashes    	  MEDICATIONS:        PHYSICAL EXAM:  T(C): 36.5 (05-10-21 @ 13:10), Max: 36.7 (05-09-21 @ 16:44)  HR: 69 (05-10-21 @ 13:10) (65 - 69)  BP: 110/68 (05-10-21 @ 13:10) (105/62 - 110/69)  RR: 18 (05-10-21 @ 13:10) (18 - 18)  SpO2: 98% (05-10-21 @ 13:10) (95% - 98%)  Wt(kg): --  I&O's Summary    09 May 2021 07:01  -  10 May 2021 07:00  --------------------------------------------------------  IN: 550 mL / OUT: 0 mL / NET: 550 mL    10 May 2021 07:01  -  10 May 2021 13:58  --------------------------------------------------------  IN: 480 mL / OUT: 0 mL / NET: 480 mL    Appearance: In no distress	  HEENT:    PERRL, EOMI	  Cardiovascular:  S1 S2, No JVD  Respiratory: Lungs clear to auscultation	  Gastrointestinal:  Soft, Non-tender, + BS	  Vascularature:  No edema of LE  Psychiatric: Appropriate affect   Neuro: no acute focal deficits                         8.3    8.32  )-----------( 118      ( 10 May 2021 07:33 )             30.0     05-09    143  |  104  |  22  ----------------------------<  146<H>  5.2   |  25  |  1.01    Ca    10.2      09 May 2021 06:40  Phos  4.2     05-09  Mg     2.3     05-09  Labs personally reviewed    ASSESSMENT/PLAN: 	  Mr. Duran is a 41 y/o man with hx germinoma (brain) s/p chemo and radiation 2000, hx hemochromatosis requiring phlebotomies, has not followed up with doctors in 3 years presenting for L leg weakness/worsening foot drop pending MRI brain/spine to r/o cord compression/recurrence of germinoma, found to be microcytic anemic hb 6.1 s/p 1U PRBC 5/2 - iron panel concerning for GI malignancy/bleed vs myelodysplastic syndrome vs thalassemia (+ family hx).       Problem/Plan - 1:   ·  Problem: SOB.  Plan: Overall weakness and SOB 2/2 significant anemia of 6.1  - Transfuse PRBC to keep hb >7  - pending GI workup with egd/colonoscopy  - EKG with NSR no ischemic changes    Problem/Plan - 2:  ·  Problem: Anemia.  Plan: - admitted with hb 6.1  - clarified hemochromatosis Hx with PCP, never had anemia prior from it  - on the contrary hx hemochromatosis requiring phlebotomy every month for few years, last done ~3 years ago per pt.   - etiology of present anemia: denies melena/ hematochezia no hemoptysis/hematemesis, no hematuria. He is FOBT positive  - ? chronic slow GI blood loss anemia over time   - CT abd with moderate splenomegaly  -Iron panel with severe iron def anemia   - Heme/onc recs and follow up appreciated, ?outpt bone marrow biopsy  - EGD/ Colonoscopy with no obvious source of bleed, f/u biopsies     Problem/Plan - 3:  ·  Problem: Germinoma.  Plan: - hx germinoma (brain) dx in 2000 s/p chemo/radiation in 1096-3728. dx when he presented with L foot drop/weakness.   -pending CT A/P malignancy workup   - MRI head Increased signal intensity along the dorsal medulla without enhancement is nonspecific in appearance. Increasing number of foci of elevated signal intensity within the subcortical and periventricular white matter. Differential diagnosis includes ischemic, infectious/inflammatory and demyelinating processes.   - Heme/onc follow up     Problem/Plan - 4:  ·  Problem: Hemochromatosis.  Plan: - hx hemochromatosis requiring monthly phlebotomies (confirmed with PCP Dr. Ponce) - last phlebotomy 3 months ago  plan  - iron panel consistent with severe Iron def anemia, suspicious for blood loss anemia   - heme rec MRI liver and genetic testing for hemachromatosis   - s/p LP 5/7-- concern for MS- f/u neuro reccs    Problem/Plan - 5:  ·  Problem: Leukopenia.  Plan: leukopenia with wbc 3.11. diff wnl.   - possible bone marrow suppression vs malignancy. afebrile, no infection sx including congestion/cough/fevers/chills/diarrhea. Less likely infection.   plan  -  daily CBC  - f/u heme onc recs    Problem/Plan - 6:  Problem: Fasciculation of tongue. Plan: - fasciulation of tongue observed on exam  - etiology: benign    Problem/Plan - 7:  ·  Problem: Nystagmus.  Plan: - horizontal nystagmus with lateral eye movements - pt states has had before, is not sure cause.   - etiology: cerebellum lesion though no dysmetria and no dyskinesia.     Problem/Plan - 8:  ·  Problem: Abnormal MRI brain.  Plan: MRI head Increased signal intensity along the dorsal medulla without enhancement is nonspecific in appearance. Increasing number of foci of elevated signal intensity within the subcortical and periventricular white matter. Differential diagnosis includes ischemic, infectious/inflammatory and demyelinating processes.   -MR Thoracic and Cervical Spine w/wo IV Cont were done and demonstrated: enhancing left cord lesion at C4-C5 most compatible with acute demyelination.  - neurology follow up noted   - 5 days of IV Solumedrol   - s/p bedside LP 5/7   - appears LE weakness on presentation likely 2/2 MS      pending dc home with home PT - needs rolling walker      Emily LACEY-ROEL Smith DO [facility]  Cardiovascular Medicine  [address], [address]  Office: [phone]  Cell: [phone] Patient is a 40y old  Male who presents with a chief complaint of Anemia (10 May 2021 09:21)      INTERVAL HISTORY: feels betterm pending dc home tomorrow     REVIEW OF SYSTEMS:   CONSTITUTIONAL: No weakness  EYES/ENT: No visual changes; No throat pain  Neck: No pain or stiffness  Respiratory: No cough, wheezing, No shortness of breath  CARDIOVASCULAR: no chest pain or palpitations  GASTROINTESTINAL: No abdominal pain, no nausea, vomiting or hematemesis  GENITOURINARY: No dysuria, frequency or hematuria  NEUROLOGICAL: No stroke like symptoms  SKIN: No rashes    	  MEDICATIONS:        PHYSICAL EXAM:  T(C): 36.5 (05-10-21 @ 13:10), Max: 36.7 (05-09-21 @ 16:44)  HR: 69 (05-10-21 @ 13:10) (65 - 69)  BP: 110/68 (05-10-21 @ 13:10) (105/62 - 110/69)  RR: 18 (05-10-21 @ 13:10) (18 - 18)  SpO2: 98% (05-10-21 @ 13:10) (95% - 98%)  Wt(kg): --  I&O's Summary    09 May 2021 07:01  -  10 May 2021 07:00  --------------------------------------------------------  IN: 550 mL / OUT: 0 mL / NET: 550 mL    10 May 2021 07:01  -  10 May 2021 13:58  --------------------------------------------------------  IN: 480 mL / OUT: 0 mL / NET: 480 mL    Appearance: In no distress	  HEENT:    PERRL, EOMI	  Cardiovascular:  S1 S2, No JVD  Respiratory: Lungs clear to auscultation	  Gastrointestinal:  Soft, Non-tender, + BS	  Vascularature:  No edema of LE  Psychiatric: Appropriate affect   Neuro: no acute focal deficits                         8.3    8.32  )-----------( 118      ( 10 May 2021 07:33 )             30.0     05-09    143  |  104  |  22  ----------------------------<  146<H>  5.2   |  25  |  1.01    Ca    10.2      09 May 2021 06:40  Phos  4.2     05-09  Mg     2.3     05-09  Labs personally reviewed    ASSESSMENT/PLAN: 	  Mr. Duran is a 41 y/o man with hx germinoma (brain) s/p chemo and radiation 2000, hx hemochromatosis requiring phlebotomies, has not followed up with doctors in 3 years presenting for L leg weakness/worsening foot drop pending MRI brain/spine to r/o cord compression/recurrence of germinoma, found to be microcytic anemic hb 6.1 s/p 1U PRBC 5/2 - iron panel concerning for GI malignancy/bleed vs myelodysplastic syndrome vs thalassemia (+ family hx).       Problem/Plan - 1:   ·  Problem: SOB.  Plan: Overall weakness and SOB 2/2 significant anemia of 6.1  - Transfuse PRBC to keep hb >7  - pending GI workup with egd/colonoscopy  - EKG with NSR no ischemic changes    Problem/Plan - 2:  ·  Problem: Anemia.  Plan: - admitted with hb 6.1  - clarified hemochromatosis Hx with PCP, never had anemia prior from it  - on the contrary hx hemochromatosis requiring phlebotomy every month for few years, last done ~3 years ago per pt.   - etiology of present anemia: denies melena/ hematochezia no hemoptysis/hematemesis, no hematuria. He is FOBT positive  - ? chronic slow GI blood loss anemia over time   - CT abd with moderate splenomegaly  -Iron panel with severe iron def anemia   - Heme/onc recs and follow up appreciated, ?outpt bone marrow biopsy  - EGD/ Colonoscopy with no obvious source of bleed, f/u biopsies     Problem/Plan - 3:  ·  Problem: Germinoma.  Plan: - hx germinoma (brain) dx in 2000 s/p chemo/radiation in 2918-6120. dx when he presented with L foot drop/weakness.   -pending CT A/P malignancy workup   - MRI head Increased signal intensity along the dorsal medulla without enhancement is nonspecific in appearance. Increasing number of foci of elevated signal intensity within the subcortical and periventricular white matter. Differential diagnosis includes ischemic, infectious/inflammatory and demyelinating processes.   - Heme/onc follow up     Problem/Plan - 4:  ·  Problem: Hemochromatosis.  Plan: - hx hemochromatosis requiring monthly phlebotomies (confirmed with PCP Dr. Ponce) - last phlebotomy 3 months ago  plan  - iron panel consistent with severe Iron def anemia, suspicious for blood loss anemia   - heme rec MRI liver and genetic testing for hemachromatosis   - s/p LP 5/7-- concern for MS- f/u neuro reccs    Problem/Plan - 5:  ·  Problem: Leukopenia.  Plan: leukopenia with wbc 3.11. diff wnl.   - possible bone marrow suppression vs malignancy. afebrile, no infection sx including congestion/cough/fevers/chills/diarrhea. Less likely infection.   plan  -  daily CBC  - f/u heme onc recs    Problem/Plan - 6:  Problem: Fasciculation of tongue. Plan: - fasciulation of tongue observed on exam  - etiology: benign    Problem/Plan - 7:  ·  Problem: Nystagmus.  Plan: - horizontal nystagmus with lateral eye movements - pt states has had before, is not sure cause.   - etiology: cerebellum lesion though no dysmetria and no dyskinesia.     Problem/Plan - 8:  ·  Problem: Abnormal MRI brain.  Plan: MRI head Increased signal intensity along the dorsal medulla without enhancement is nonspecific in appearance. Increasing number of foci of elevated signal intensity within the subcortical and periventricular white matter. Differential diagnosis includes ischemic, infectious/inflammatory and demyelinating processes.   -MR Thoracic and Cervical Spine w/wo IV Cont were done and demonstrated: enhancing left cord lesion at C4-C5 most compatible with acute demyelination.  - neurology follow up noted   - 5 days of IV Solumedrol, completed course   - s/p bedside LP 5/7   - appears LE weakness on presentation likely 2/2 MS      pending dc home with home PT - needs rolling mainor LACEY-ROEL Smith DO [facility]  Cardiovascular Medicine  [address], [address]  Office: [phone]  Cell: [phone]

## 2021-05-10 NOTE — DIETITIAN INITIAL EVALUATION ADULT. - PROBLEM SELECTOR PLAN 1
- admitted with hb 6.1 (f/u baseline from PCP), MCV 50.7. RDW 22.9. smear with moderate hypochromia, slight target cells but heme/onc to re-examine smear. PTT elevated to 42.1.   - per pt, mother with thalassemia but never needed blood transfusion. [place] heritage. pt required blood transfusions during chemo. hx hemochromatosis requiring phlebotomy every month for few years (confirmed with PCP), last done ~3 years ago per pt.   - etiology of present anemia: no bleeding reported by pt (no melena/hematoschezia, no hemoptysis/hematemesis, no hematuria). severe microcytic anemia concerning for thalassemia though anemia is new (no records available per PCP given pt has not followed up in few years). Elevated RDW possible iron deficiency but pt has hx hemochromatosis. Possible bone marrow suppression. Possible RP hemorrhage - if other labs suggestive for bleeding rather than thallasemia then will consider obtaining CT abdomen to r/o RP hemorrhage.   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

## 2021-05-10 NOTE — PROGRESS NOTE ADULT - SUBJECTIVE AND OBJECTIVE BOX
Kiran Means Joleen, PGY1  Pager [phone]    INCOMPLETE NOTE - IN PROGRESS    Patient is a 40y old  Male who presents with a chief complaint of Anemia (07 May 2021 07:24)      SUBJECTIVE/INTERVAL EVENTS: Patient seen and examined at bedside.    MEDICATIONS  (STANDING):  ergocalciferol 83493 Unit(s) Oral <User Schedule>  ferrous    sulfate 325 milliGRAM(s) Oral two times a day  lidocaine 1% Injectable 30 milliLiter(s) Local Injection once  methylPREDNISolone sodium succinate IVPB 1000 milliGRAM(s) IV Intermittent daily    MEDICATIONS  (PRN):  acetaminophen   Tablet .. 650 milliGRAM(s) Oral every 6 hours PRN Mild Pain (1 - 3)  polyethylene glycol 3350 17 Gram(s) Oral daily PRN Constipation      VITAL SIGNS:  T(F): 97.9 (05-10-21 @ 00:33), Max: 98.1 (05-09-21 @ 16:44)  HR: 68 (05-10-21 @ 00:33) (67 - 88)  BP: 105/62 (05-10-21 @ 00:33) (105/62 - 116/69)  RR: 18 (05-10-21 @ 00:33) (18 - 18)  SpO2: 95% (05-10-21 @ 00:33) (95% - 98%)    I&O's Summary    08 May 2021 07:01  -  09 May 2021 07:00  --------------------------------------------------------  IN: 740 mL / OUT: 0 mL / NET: 740 mL    09 May 2021 07:01  -  10 May 2021 06:24  --------------------------------------------------------  IN: 500 mL / OUT: 0 mL / NET: 500 mL      Daily     Daily     PHYSICAL EXAM:  Gen: Alert, NAD  HEENT: NCAT, conjunctiva clear, sclera anicteric, no erythema or exudates in the oropharynx, mmm  Neck: Supple, no JVD  CV: RRR, S1S2, no m/r/g  Resp: CTAB, normal respiratory effort  Abd: Soft, nontender, nondistended, normal bowel sounds  Ext: no edema, no clubbing or cyanosis  Neuro: AOx3, CN2-12 grossly intact, JEAN  SKIN: warm, perfused    LABS:                        8.1    10.32 )-----------( 136      ( 09 May 2021 06:40 )             29.0     Hgb Trend: 8.1<--, 8.5<--, 8.6<--, 7.6<--, 8.6<--  05-09    143  |  104  |  22  ----------------------------<  146<H>  5.2   |  25  |  1.01    Ca    10.2      09 May 2021 06:40  Phos  4.2     05-09  Mg     2.3     05-09      Creatinine Trend: 1.01<--, 1.08<--, 1.09<--, 1.10<--, 1.02<--, 0.94<--    PT/INR - ( 09 May 2021 06:40 )   PT: 12.5 sec;   INR: 1.04 ratio         PTT - ( 09 May 2021 06:40 )  PTT:38.4 sec          CAPILLARY BLOOD GLUCOSE          RADIOLOGY & ADDITIONAL TESTS: Reviewed    Imaging Personally Reviewed:    Consultant(s) Notes Reviewed:      Care Discussed with Consultants/Other Providers:   Kiran Means Joleen, PGY1  Pager [phone]      Patient is a 40y old  Male who presents with a chief complaint of Anemia (07 May 2021 07:24)    SUBJECTIVE/INTERVAL EVENTS:  - NAEON  - Reports feeling well, increased strength in LLE  - Admits to constipation since 5/6    MEDICATIONS  (STANDING):  ergocalciferol 06138 Unit(s) Oral <User Schedule>  ferrous    sulfate 325 milliGRAM(s) Oral two times a day  lidocaine 1% Injectable 30 milliLiter(s) Local Injection once  methylPREDNISolone sodium succinate IVPB 1000 milliGRAM(s) IV Intermittent daily    MEDICATIONS  (PRN):  acetaminophen   Tablet .. 650 milliGRAM(s) Oral every 6 hours PRN Mild Pain (1 - 3)  polyethylene glycol 3350 17 Gram(s) Oral daily PRN Constipation      VITAL SIGNS:  T(F): 97.9 (05-10-21 @ 00:33), Max: 98.1 (05-09-21 @ 16:44)  HR: 68 (05-10-21 @ 00:33) (67 - 88)  BP: 105/62 (05-10-21 @ 00:33) (105/62 - 116/69)  RR: 18 (05-10-21 @ 00:33) (18 - 18)  SpO2: 95% (05-10-21 @ 00:33) (95% - 98%)    I&O's Summary    08 May 2021 07:01  -  09 May 2021 07:00  --------------------------------------------------------  IN: 740 mL / OUT: 0 mL / NET: 740 mL    09 May 2021 07:01  -  10 May 2021 06:24  --------------------------------------------------------  IN: 500 mL / OUT: 0 mL / NET: 500 mL      Daily     Daily     PHYSICAL EXAM:  GENERAL: NAD, appearing comfortable  HEAD: Atraumatic, Normocephalic  EYES: EOMI, mild horizontal nystagmus on L lateral gaze   ENT: Moist mucous membranes  NECK: Supple  CHEST/LUNG: clear to auscultation bilaterally, no crackles/wheeze/ronchi  HEART: regular rate and rhythm, no murmurs  ABDOMEN: soft, nondistended, nontender abdomen.   EXTREMITIES:  no edema. able to move all extremities. no rashes.   NERVOUS SYSTEM: A&Ox3, CN2-12 - mild horizonal nystagmus on L lateral gaze. 4/5 on L leg extension, 5/5 strength in RLE.   SKIN: No rashes or lesions      LABS:                        8.1    10.32 )-----------( 136      ( 09 May 2021 06:40 )             29.0     Hgb Trend: 8.1<--, 8.5<--, 8.6<--, 7.6<--, 8.6<--  05-09    143  |  104  |  22  ----------------------------<  146<H>  5.2   |  25  |  1.01    Ca    10.2      09 May 2021 06:40  Phos  4.2     05-09  Mg     2.3     05-09      Creatinine Trend: 1.01<--, 1.08<--, 1.09<--, 1.10<--, 1.02<--, 0.94<--    PT/INR - ( 09 May 2021 06:40 )   PT: 12.5 sec;   INR: 1.04 ratio         PTT - ( 09 May 2021 06:40 )  PTT:38.4 sec          CAPILLARY BLOOD GLUCOSE          RADIOLOGY & ADDITIONAL TESTS: Reviewed    Imaging Personally Reviewed:    Consultant(s) Notes Reviewed:      Care Discussed with Consultants/Other Providers:

## 2021-05-10 NOTE — DIETITIAN INITIAL EVALUATION ADULT. - PROBLEM SELECTOR PLAN 2
- 1wk of L leg weakness with worse foot drop and gait difficulty. foot drop present on gait exam. pt had L foot drop since germinoma in 2000 and had high steppage but over last week does not have strength for this and dragging L foot.    - concerning for cord compression in setting of germinoma with lost to follow up for few years without imaging. no incontinence, weakness of R leg. possible ALS.   plan  - f/u MRI lumbar spine. f/u MRI brain for recurrence of germinoma/ other lesions.  - neuro was consulted in ED - f/u.

## 2021-05-10 NOTE — DIETITIAN INITIAL EVALUATION ADULT. - OTHER INFO
Weights: pt reports weight has been stable ~ 155lbs for several years, current dosing weight is ~ 150lbs. Will continue to monitor. Unable to obtain new bed weight as pt in chair eating breakfast, will continue to monitor.       Since admission pt reports good PO intake and appetite, consuming >75% of meals. RD observed breakfast tray with most of meal consumed. No acute GI distress noted. Last BM 5/10. Patient with no nutrition-related questions at this time. Made aware RD remains available as needed.

## 2021-05-10 NOTE — PROGRESS NOTE ADULT - ATTENDING COMMENTS
Patient has nearly full DF strength bilaterally, agree with plan of treatment thus far, agree with dispo planning and DC tomorrow after last steroid dose and f/u with Dr. Galo (MS) in clinic

## 2021-05-10 NOTE — PROGRESS NOTE ADULT - PROBLEM SELECTOR PLAN 7
PANCYTOPENIA  leukopenia with wbc 3.11 on admission. diff wnl.   - possible MDS.   plan  -would benefit from BM bx   - CTM daily CBC  - f/u heme onc recs    #thrombocytopenia  - no heparin exposure. possible exposure to pantoprazole (was started on it for concern of GI bleed)  - discontinue pantoprazole as no evidence of GI bleed  -again, could be MDS.     #atrophic kidney  - likely 2/2 hx kidney stones requiring lithotripsy. CT AP this admission with non-obstructing right renal stone. Marked right renal scarring and atrophy. No hydronephrosis or mass. Vitamin D deficiency, likely a/w MS  - Weekly ergocalciferol

## 2021-05-10 NOTE — PROGRESS NOTE ADULT - PROBLEM SELECTOR PLAN 2
- hb electrophoresis w beta thalassemia minor (hb A 95.1, hb A2 elevated 4.9) - unlikely to explain anemia.   - clotting factor lvls - factor 8 23, factor 9 73. von Willebrand factor activity wnl - no hx bleeding per pt. unclear etiology, unlikely to explain anemia  - f/u heme onc recs Severe microcytic anemia likely secondary to iron deficiency vs. MDS vs. possible SBO bleed. Hgb stable.  - S/p venofer. C/w FeSO4  - Unclear etiology for iron defiency - celiac anti transglutaminase neg and duodenal bx neg for celiac  - EGD and colonoscopy 5/7 without bleeding evidence/malignancy -> plan for outpatient capsule study

## 2021-05-11 ENCOUNTER — TRANSCRIPTION ENCOUNTER (OUTPATIENT)
Age: 41
End: 2021-05-11

## 2021-05-11 VITALS
SYSTOLIC BLOOD PRESSURE: 118 MMHG | TEMPERATURE: 98 F | RESPIRATION RATE: 18 BRPM | OXYGEN SATURATION: 98 % | DIASTOLIC BLOOD PRESSURE: 75 MMHG | HEART RATE: 98 BPM

## 2021-05-11 LAB
ANION GAP SERPL CALC-SCNC: 15 MMOL/L — SIGNIFICANT CHANGE UP (ref 5–17)
BUN SERPL-MCNC: 22 MG/DL — SIGNIFICANT CHANGE UP (ref 7–23)
CALCIUM SERPL-MCNC: 9.7 MG/DL — SIGNIFICANT CHANGE UP (ref 8.4–10.5)
CHLORIDE SERPL-SCNC: 104 MMOL/L — SIGNIFICANT CHANGE UP (ref 96–108)
CO2 SERPL-SCNC: 21 MMOL/L — LOW (ref 22–31)
CREAT SERPL-MCNC: 0.85 MG/DL — SIGNIFICANT CHANGE UP (ref 0.5–1.3)
GLUCOSE SERPL-MCNC: 110 MG/DL — HIGH (ref 70–99)
GLYCOPROTEIN IV ANTIBODY: NEGATIVE — SIGNIFICANT CHANGE UP
HCT VFR BLD CALC: 30.6 % — LOW (ref 39–50)
HGB BLD-MCNC: 8.5 G/DL — LOW (ref 13–17)
HLA AB SER QL IA: NEGATIVE — SIGNIFICANT CHANGE UP
MCHC RBC-ENTMCNC: 15.8 PG — LOW (ref 27–34)
MCHC RBC-ENTMCNC: 27.8 GM/DL — LOW (ref 32–36)
MCV RBC AUTO: 57 FL — LOW (ref 80–100)
METHYLMALONATE SERPL-SCNC: 172 NMOL/L — SIGNIFICANT CHANGE UP (ref 0–378)
NRBC # BLD: 0 /100 WBCS — SIGNIFICANT CHANGE UP (ref 0–0)
OLIGOCLONAL BANDS CSF ELPH-IMP: PRESENT
OLIGOCLONAL BANDS CSF ELPH-IMP: SIGNIFICANT CHANGE UP
PLAT GP IA/IIA AB SER QL IA: SIGNIFICANT CHANGE UP
PLAT GP IB/IX AB SER QL IA: NEGATIVE — SIGNIFICANT CHANGE UP
PLAT GP IIB/IIIA AB SER QL IA: POSITIVE
PLATELET # BLD AUTO: 128 K/UL — LOW (ref 150–400)
POTASSIUM SERPL-MCNC: 4.7 MMOL/L — SIGNIFICANT CHANGE UP (ref 3.5–5.3)
POTASSIUM SERPL-SCNC: 4.7 MMOL/L — SIGNIFICANT CHANGE UP (ref 3.5–5.3)
RBC # BLD: 5.37 M/UL — SIGNIFICANT CHANGE UP (ref 4.2–5.8)
RBC # FLD: 34 % — HIGH (ref 10.3–14.5)
SODIUM SERPL-SCNC: 140 MMOL/L — SIGNIFICANT CHANGE UP (ref 135–145)
WBC # BLD: 8.24 K/UL — SIGNIFICANT CHANGE UP (ref 3.8–10.5)
WBC # FLD AUTO: 8.24 K/UL — SIGNIFICANT CHANGE UP (ref 3.8–10.5)

## 2021-05-11 PROCEDURE — 72157 MRI CHEST SPINE W/O & W/DYE: CPT

## 2021-05-11 PROCEDURE — 85280 CLOT FACTOR XII HAGEMAN: CPT

## 2021-05-11 PROCEDURE — 84165 PROTEIN E-PHORESIS SERUM: CPT

## 2021-05-11 PROCEDURE — 85384 FIBRINOGEN ACTIVITY: CPT

## 2021-05-11 PROCEDURE — 82105 ALPHA-FETOPROTEIN SERUM: CPT

## 2021-05-11 PROCEDURE — U0005: CPT

## 2021-05-11 PROCEDURE — 70553 MRI BRAIN STEM W/O & W/DYE: CPT

## 2021-05-11 PROCEDURE — 93005 ELECTROCARDIOGRAM TRACING: CPT

## 2021-05-11 PROCEDURE — 83655 ASSAY OF LEAD: CPT

## 2021-05-11 PROCEDURE — 83921 ORGANIC ACID SINGLE QUANT: CPT

## 2021-05-11 PROCEDURE — 85652 RBC SED RATE AUTOMATED: CPT

## 2021-05-11 PROCEDURE — 80307 DRUG TEST PRSMV CHEM ANLYZR: CPT

## 2021-05-11 PROCEDURE — 83010 ASSAY OF HAPTOGLOBIN QUANT: CPT

## 2021-05-11 PROCEDURE — 87483 CNS DNA AMP PROBE TYPE 12-25: CPT

## 2021-05-11 PROCEDURE — 99285 EMERGENCY DEPT VISIT HI MDM: CPT

## 2021-05-11 PROCEDURE — 86140 C-REACTIVE PROTEIN: CPT

## 2021-05-11 PROCEDURE — 85027 COMPLETE CBC AUTOMATED: CPT

## 2021-05-11 PROCEDURE — 82607 VITAMIN B-12: CPT

## 2021-05-11 PROCEDURE — 88305 TISSUE EXAM BY PATHOLOGIST: CPT

## 2021-05-11 PROCEDURE — 83036 HEMOGLOBIN GLYCOSYLATED A1C: CPT

## 2021-05-11 PROCEDURE — 84157 ASSAY OF PROTEIN OTHER: CPT

## 2021-05-11 PROCEDURE — 86334 IMMUNOFIX E-PHORESIS SERUM: CPT

## 2021-05-11 PROCEDURE — 82272 OCCULT BLD FECES 1-3 TESTS: CPT

## 2021-05-11 PROCEDURE — 86644 CMV ANTIBODY: CPT

## 2021-05-11 PROCEDURE — 86900 BLOOD TYPING SEROLOGIC ABO: CPT

## 2021-05-11 PROCEDURE — 87070 CULTURE OTHR SPECIMN AEROBIC: CPT

## 2021-05-11 PROCEDURE — 81001 URINALYSIS AUTO W/SCOPE: CPT

## 2021-05-11 PROCEDURE — 85270 CLOT FACTOR XI PTA: CPT

## 2021-05-11 PROCEDURE — 70450 CT HEAD/BRAIN W/O DYE: CPT

## 2021-05-11 PROCEDURE — 82746 ASSAY OF FOLIC ACID SERUM: CPT

## 2021-05-11 PROCEDURE — 84630 ASSAY OF ZINC: CPT

## 2021-05-11 PROCEDURE — 83550 IRON BINDING TEST: CPT

## 2021-05-11 PROCEDURE — 88237 TISSUE CULTURE BONE MARROW: CPT

## 2021-05-11 PROCEDURE — 86850 RBC ANTIBODY SCREEN: CPT

## 2021-05-11 PROCEDURE — 82728 ASSAY OF FERRITIN: CPT

## 2021-05-11 PROCEDURE — 83615 LACTATE (LD) (LDH) ENZYME: CPT

## 2021-05-11 PROCEDURE — 88184 FLOWCYTOMETRY/ TC 1 MARKER: CPT

## 2021-05-11 PROCEDURE — 86053 AQAPRN-4 ANTB FLO CYTMTRY EA: CPT

## 2021-05-11 PROCEDURE — 86703 HIV-1/HIV-2 1 RESULT ANTBDY: CPT

## 2021-05-11 PROCEDURE — 86362 MOG-IGG1 ANTB CBA EACH: CPT

## 2021-05-11 PROCEDURE — 85379 FIBRIN DEGRADATION QUANT: CPT

## 2021-05-11 PROCEDURE — 80048 BASIC METABOLIC PNL TOTAL CA: CPT

## 2021-05-11 PROCEDURE — 88185 FLOWCYTOMETRY/TC ADD-ON: CPT

## 2021-05-11 PROCEDURE — U0003: CPT

## 2021-05-11 PROCEDURE — 86665 EPSTEIN-BARR CAPSID VCA: CPT

## 2021-05-11 PROCEDURE — P9016: CPT

## 2021-05-11 PROCEDURE — 86769 SARS-COV-2 COVID-19 ANTIBODY: CPT

## 2021-05-11 PROCEDURE — 87205 SMEAR GRAM STAIN: CPT

## 2021-05-11 PROCEDURE — 86747 PARVOVIRUS ANTIBODY: CPT

## 2021-05-11 PROCEDURE — 84466 ASSAY OF TRANSFERRIN: CPT

## 2021-05-11 PROCEDURE — 85610 PROTHROMBIN TIME: CPT

## 2021-05-11 PROCEDURE — 86038 ANTINUCLEAR ANTIBODIES: CPT

## 2021-05-11 PROCEDURE — 82010 KETONE BODYS QUAN: CPT

## 2021-05-11 PROCEDURE — 82525 ASSAY OF COPPER: CPT

## 2021-05-11 PROCEDURE — 74177 CT ABD & PELVIS W/CONTRAST: CPT

## 2021-05-11 PROCEDURE — 87799 DETECT AGENT NOS DNA QUANT: CPT

## 2021-05-11 PROCEDURE — 72156 MRI NECK SPINE W/O & W/DYE: CPT

## 2021-05-11 PROCEDURE — 97165 OT EVAL LOW COMPLEX 30 MIN: CPT

## 2021-05-11 PROCEDURE — 82306 VITAMIN D 25 HYDROXY: CPT

## 2021-05-11 PROCEDURE — 85045 AUTOMATED RETICULOCYTE COUNT: CPT

## 2021-05-11 PROCEDURE — 82945 GLUCOSE OTHER FLUID: CPT

## 2021-05-11 PROCEDURE — 84550 ASSAY OF BLOOD/URIC ACID: CPT

## 2021-05-11 PROCEDURE — 86431 RHEUMATOID FACTOR QUANT: CPT

## 2021-05-11 PROCEDURE — 36430 TRANSFUSION BLD/BLD COMPNT: CPT

## 2021-05-11 PROCEDURE — 82300 ASSAY OF CADMIUM: CPT

## 2021-05-11 PROCEDURE — 72158 MRI LUMBAR SPINE W/O & W/DYE: CPT

## 2021-05-11 PROCEDURE — 99239 HOSP IP/OBS DSCHRG MGMT >30: CPT | Mod: GC

## 2021-05-11 PROCEDURE — 86255 FLUORESCENT ANTIBODY SCREEN: CPT

## 2021-05-11 PROCEDURE — 84446 ASSAY OF VITAMIN E: CPT

## 2021-05-11 PROCEDURE — 84403 ASSAY OF TOTAL TESTOSTERONE: CPT

## 2021-05-11 PROCEDURE — 86645 CMV ANTIBODY IGM: CPT

## 2021-05-11 PROCEDURE — 82175 ASSAY OF ARSENIC: CPT

## 2021-05-11 PROCEDURE — 83520 IMMUNOASSAY QUANT NOS NONAB: CPT

## 2021-05-11 PROCEDURE — 83735 ASSAY OF MAGNESIUM: CPT

## 2021-05-11 PROCEDURE — 84402 ASSAY OF FREE TESTOSTERONE: CPT

## 2021-05-11 PROCEDURE — 72148 MRI LUMBAR SPINE W/O DYE: CPT

## 2021-05-11 PROCEDURE — 97161 PT EVAL LOW COMPLEX 20 MIN: CPT

## 2021-05-11 PROCEDURE — 97530 THERAPEUTIC ACTIVITIES: CPT

## 2021-05-11 PROCEDURE — 84100 ASSAY OF PHOSPHORUS: CPT

## 2021-05-11 PROCEDURE — 86664 EPSTEIN-BARR NUCLEAR ANTIGEN: CPT

## 2021-05-11 PROCEDURE — 86663 EPSTEIN-BARR ANTIBODY: CPT

## 2021-05-11 PROCEDURE — 85732 THROMBOPLASTIN TIME PARTIAL: CPT

## 2021-05-11 PROCEDURE — 82962 GLUCOSE BLOOD TEST: CPT

## 2021-05-11 PROCEDURE — 84704 HCG FREE BETACHAIN TEST: CPT

## 2021-05-11 PROCEDURE — 87389 HIV-1 AG W/HIV-1&-2 AB AG IA: CPT

## 2021-05-11 PROCEDURE — 85730 THROMBOPLASTIN TIME PARTIAL: CPT

## 2021-05-11 PROCEDURE — 81256 HFE GENE: CPT

## 2021-05-11 PROCEDURE — 85240 CLOT FACTOR VIII AHG 1 STAGE: CPT

## 2021-05-11 PROCEDURE — 85250 CLOT FACTOR IX PTC/CHRSTMAS: CPT

## 2021-05-11 PROCEDURE — 85025 COMPLETE CBC W/AUTO DIFF WBC: CPT

## 2021-05-11 PROCEDURE — 84155 ASSAY OF PROTEIN SERUM: CPT

## 2021-05-11 PROCEDURE — 85245 CLOT FACTOR VIII VW RISTOCTN: CPT

## 2021-05-11 PROCEDURE — 84443 ASSAY THYROID STIM HORMONE: CPT

## 2021-05-11 PROCEDURE — 86923 COMPATIBILITY TEST ELECTRIC: CPT

## 2021-05-11 PROCEDURE — 87338 HPYLORI STOOL AG IA: CPT

## 2021-05-11 PROCEDURE — 89051 BODY FLUID CELL COUNT: CPT

## 2021-05-11 PROCEDURE — 83825 ASSAY OF MERCURY: CPT

## 2021-05-11 PROCEDURE — 83540 ASSAY OF IRON: CPT

## 2021-05-11 PROCEDURE — 83020 HEMOGLOBIN ELECTROPHORESIS: CPT

## 2021-05-11 PROCEDURE — 86022 PLATELET ANTIBODIES: CPT

## 2021-05-11 PROCEDURE — 84439 ASSAY OF FREE THYROXINE: CPT

## 2021-05-11 PROCEDURE — A9585: CPT

## 2021-05-11 PROCEDURE — 86880 COOMBS TEST DIRECT: CPT

## 2021-05-11 PROCEDURE — 86364 TISS TRNSGLTMNASE EA IG CLAS: CPT

## 2021-05-11 PROCEDURE — 80053 COMPREHEN METABOLIC PANEL: CPT

## 2021-05-11 PROCEDURE — 88108 CYTOPATH CONCENTRATE TECH: CPT

## 2021-05-11 PROCEDURE — 85670 THROMBIN TIME PLASMA: CPT

## 2021-05-11 PROCEDURE — 84166 PROTEIN E-PHORESIS/URINE/CSF: CPT

## 2021-05-11 PROCEDURE — 82390 ASSAY OF CERULOPLASMIN: CPT

## 2021-05-11 PROCEDURE — 82550 ASSAY OF CK (CPK): CPT

## 2021-05-11 PROCEDURE — 85611 PROTHROMBIN TEST: CPT

## 2021-05-11 PROCEDURE — 86901 BLOOD TYPING SEROLOGIC RH(D): CPT

## 2021-05-11 PROCEDURE — 82784 ASSAY IGA/IGD/IGG/IGM EACH: CPT

## 2021-05-11 RX ORDER — ERGOCALCIFEROL 1.25 MG/1
1 CAPSULE ORAL
Qty: 4 | Refills: 0
Start: 2021-05-11

## 2021-05-11 RX ORDER — FERROUS SULFATE 325(65) MG
1 TABLET ORAL
Qty: 60 | Refills: 0
Start: 2021-05-11 | End: 2021-06-09

## 2021-05-11 RX ADMIN — Medication 325 MILLIGRAM(S): at 07:08

## 2021-05-11 NOTE — PROGRESS NOTE ADULT - PROBLEM SELECTOR PLAN 4
PTT prolonged to 40's, likely due to mild factor 8 and 9 deficiency.   - stable  - outpatient heme/onc f/u PTT prolonged to 34 likely due to mild factor 8 and 9 deficiency.   - stable  - outpatient heme/onc f/u

## 2021-05-11 NOTE — PROGRESS NOTE ADULT - ATTENDING COMMENTS
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  Seen at bedside today. No acute events.   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  Seen at bedside today. No acute events.   Stable for discharge to home today with outpatient followup  Plan of care discussed at length (>30 minutes) with pt and wife at bedside; all questions/concerns thoroughly addressed  Also discussed with pt's PCP Dr Ponce via phonecall.  40 minutes spent on discharge planning

## 2021-05-11 NOTE — PROGRESS NOTE ADULT - PROBLEM SELECTOR PLAN 5
Small L inferior frontal meningioma (measuring 3.5 x 8.4 mm) seen on MR head 5/4  - Per neuro, appears benign  - Outpatient neuro f/u

## 2021-05-11 NOTE — PROGRESS NOTE ADULT - PROBLEM SELECTOR PLAN 2
Severe microcytic anemia likely secondary to iron deficiency vs. MDS vs. possible SBO bleed. Hgb stable.  - S/p venofer. C/w FeSO4  - Unclear etiology for iron defiency - celiac anti transglutaminase neg and duodenal bx neg for celiac  - EGD and colonoscopy 5/7 without bleeding evidence/malignancy -> plan for outpatient capsule study Severe microcytic anemia likely secondary to iron deficiency vs. MDS vs. possible SBO bleed. Hgb stable.  - S/p venofer. C/w FeSO4  - Unclear etiology for iron deficiency - celiac anti transglutaminase neg and duodenal bx neg for celiac  - EGD and colonoscopy 5/7 without bleeding evidence/malignancy -> plan for outpatient capsule study

## 2021-05-11 NOTE — PROGRESS NOTE ADULT - PROBLEM SELECTOR PLAN 9
DVT ppx - SCDs  diet - regular  Dispo: home with home PT, needs rolling walker DVT ppx - SCDs  diet - regular  Dispo: home with outpatient PT OT. Rolling walker given.

## 2021-05-11 NOTE — PROGRESS NOTE ADULT - PROVIDER SPECIALTY LIST ADULT
Cardiology
Gastroenterology
Cardiology
Gastroenterology
Gastroenterology
Heme/Onc
Neurology
Neurology
Cardiology
Neurology
Internal Medicine

## 2021-05-11 NOTE — PROGRESS NOTE ADULT - PROBLEM SELECTOR PLAN 6
Hx hemochromatosis requiring monthly phlebotomies (confirmed with PCP Dr. Ponce), likely diagnosed with liver MR. Last phlebotomy 3 months ago. Unclear if primary or secondary.  - currently not in iron overload.  - will draw hematochromatosis genetic testing  - Outpatient f/u with heme/onc and out-patient MRI of liver to evaluate for iron deposition

## 2021-05-11 NOTE — PROGRESS NOTE ADULT - ASSESSMENT
Mr. Duran is a 41 y/o man with hx germinoma (brain) s/p chemo and radiation 2000, hx hemochromatosis requiring phlebotomies, has not followed up with doctors in 3 years presenting for L leg weakness/worsening foot drop pending MRI brain/spine to r/o cord compression/recurrence of germinoma, found to be microcytic anemic hb 6.1 MCV 50s, severe JAS with ferritin 2, beta thalassemia minor, low factor 8, EGD and colonoscopy without bleed. possible MDS. also found to have acute on chronic demyelinating lesions in spinal cord - s/p LP 5/7, possibly MS.  Mr. Duran is a 41 y/o man with hx germinoma (brain) s/p chemo and radiation 2000, hx hemochromatosis requiring phlebotomies, has not followed up with doctors in 3 years presenting for L leg weakness/worsening foot drop pending MRI brain/spine to r/o cord compression/recurrence of germinoma, found to be microcytic anemic hb 6.1 MCV 50s, severe JAS with ferritin 2, beta thalassemia minor, low factor 8, EGD and colonoscopy without bleed. possible MDS. also found to have acute on chronic demyelinating lesions in spinal cord - s/p LP 5/7, possibly MS. Improvement of LLE weakness s/p 5 day course of steroids.  Stable for discharge.

## 2021-05-11 NOTE — PROGRESS NOTE ADULT - PROBLEM SELECTOR PLAN 8
Incidental thyroid nodule seen on CT  - Will need outpatient f/u for u/s or monitoring, will sign out to PCP Dr. Ponce Incidental thyroid nodule seen on CT  - Will need outpatient f/u with PMD for u/s or monitoring

## 2021-05-11 NOTE — PROGRESS NOTE ADULT - ATTENDING SUPERVISION STATEMENT
ACP
Resident
ACP
Fellow
Fellow
ACP/Resident
Fellow
Fellow
Resident

## 2021-05-11 NOTE — PROGRESS NOTE ADULT - PROBLEM SELECTOR PLAN 3
Pancytopenia, with leukopenia improved with steroids. C/f MDS. Stable.  - Outpatient f/u with heme/onc

## 2021-05-11 NOTE — DISCHARGE NOTE NURSING/CASE MANAGEMENT/SOCIAL WORK - PATIENT PORTAL LINK FT
You can access the FollowMyHealth Patient Portal offered by [facility] by registering at the following website: [email or web address]. By joining [employer]’s FollowMyHealth portal, you will also be able to view your health information using other applications (apps) compatible with our system.

## 2021-05-11 NOTE — PROGRESS NOTE ADULT - SUBJECTIVE AND OBJECTIVE BOX
Patient is a 40y old  Male who presents with a chief complaint of Anemia (10 May 2021 09:21)      INTERVAL HISTORY: feels ok, dc today     REVIEW OF SYSTEMS:   CONSTITUTIONAL: No weakness  EYES/ENT: No visual changes; No throat pain  Neck: No pain or stiffness  Respiratory: No cough, wheezing, No shortness of breath  CARDIOVASCULAR: no chest pain or palpitations  GASTROINTESTINAL: No abdominal pain, no nausea, vomiting or hematemesis  GENITOURINARY: No dysuria, frequency or hematuria  NEUROLOGICAL: No stroke like symptoms  SKIN: No rashes    	  MEDICATIONS:        PHYSICAL EXAM:  T(C): 36.7 (05-11-21 @ 09:01), Max: 36.8 (05-10-21 @ 20:17)  HR: 70 (05-11-21 @ 09:01) (60 - 79)  BP: 112/70 (05-11-21 @ 09:01) (107/69 - 116/73)  RR: 18 (05-11-21 @ 09:01) (18 - 18)  SpO2: 97% (05-11-21 @ 09:01) (97% - 99%)  Wt(kg): --  I&O's Summary    10 May 2021 07:01  -  11 May 2021 07:00  --------------------------------------------------------  IN: 480 mL / OUT: 0 mL / NET: 480 mL    11 May 2021 07:01  -  11 May 2021 13:05  --------------------------------------------------------  IN: 200 mL / OUT: 0 mL / NET: 200 mL    Appearance: In no distress	  HEENT:    PERRL, EOMI	  Cardiovascular:  S1 S2, No JVD  Respiratory: Lungs clear to auscultation	  Gastrointestinal:  Soft, Non-tender, + BS	  Vascularature:  No edema of LE  Psychiatric: Appropriate affect   Neuro: no acute focal deficits                         8.5    8.24  )-----------( 128      ( 11 May 2021 06:59 )             30.6     05-11    140  |  104  |  22  ----------------------------<  110<H>  4.7   |  21<L>  |  0.85    Ca    9.7      11 May 2021 06:59    Labs personally reviewed    ASSESSMENT/PLAN: 	  Mr. Duran is a 41 y/o man with hx germinoma (brain) s/p chemo and radiation 2000, hx hemochromatosis requiring phlebotomies, has not followed up with doctors in 3 years presenting for L leg weakness/worsening foot drop pending MRI brain/spine to r/o cord compression/recurrence of germinoma, found to be microcytic anemic hb 6.1 s/p 1U PRBC 5/2 - iron panel concerning for GI malignancy/bleed vs myelodysplastic syndrome vs thalassemia (+ family hx).       Problem/Plan - 1:   ·  Problem: SOB.  Plan: Overall weakness and SOB 2/2 significant anemia of 6.1  - Transfuse PRBC to keep hb >7  - pending GI workup with egd/colonoscopy  - EKG with NSR no ischemic changes    Problem/Plan - 2:  ·  Problem: Anemia.  Plan: - admitted with hb 6.1  - clarified hemochromatosis Hx with PCP, never had anemia prior from it  - on the contrary hx hemochromatosis requiring phlebotomy every month for few years, last done ~3 years ago per pt.   - etiology of present anemia: denies melena/ hematochezia no hemoptysis/hematemesis, no hematuria. He is FOBT positive  - ? chronic slow GI blood loss anemia over time   - CT abd with moderate splenomegaly  -Iron panel with severe iron def anemia   - Heme/onc recs and follow up appreciated, ?outpt bone marrow biopsy  - EGD/ Colonoscopy with no obvious source of bleed, f/u biopsies     Problem/Plan - 3:  ·  Problem: Germinoma.  Plan: - hx germinoma (brain) dx in 2000 s/p chemo/radiation in [id number]. dx when he presented with L foot drop/weakness.   -pending CT A/P malignancy workup   - MRI head Increased signal intensity along the dorsal medulla without enhancement is nonspecific in appearance. Increasing number of foci of elevated signal intensity within the subcortical and periventricular white matter. Differential diagnosis includes ischemic, infectious/inflammatory and demyelinating processes.   - Heme/onc follow up     Problem/Plan - 4:  ·  Problem: Hemochromatosis.  Plan: - hx hemochromatosis requiring monthly phlebotomies (confirmed with PCP Dr. Ponce) - last phlebotomy 3 months ago  plan  - iron panel consistent with severe Iron def anemia, suspicious for blood loss anemia   - heme rec MRI liver and genetic testing for hemachromatosis   - s/p LP 5/7-- concern for MS- f/u neuro reccs    Problem/Plan - 5:  ·  Problem: Leukopenia.  Plan: leukopenia with wbc 3.11. diff wnl.   - possible bone marrow suppression vs malignancy. afebrile, no infection sx including congestion/cough/fevers/chills/diarrhea. Less likely infection.   plan  -  daily CBC  - f/u heme onc recs    Problem/Plan - 6:  Problem: Fasciculation of tongue. Plan: - fasciulation of tongue observed on exam  - etiology: benign    Problem/Plan - 7:  ·  Problem: Nystagmus.  Plan: - horizontal nystagmus with lateral eye movements - pt states has had before, is not sure cause.   - etiology: cerebellum lesion though no dysmetria and no dyskinesia.     Problem/Plan - 8:  ·  Problem: Abnormal MRI brain.  Plan: MRI head Increased signal intensity along the dorsal medulla without enhancement is nonspecific in appearance. Increasing number of foci of elevated signal intensity within the subcortical and periventricular white matter. Differential diagnosis includes ischemic, infectious/inflammatory and demyelinating processes.   -MR Thoracic and Cervical Spine w/wo IV Cont were done and demonstrated: enhancing left cord lesion at C4-C5 most compatible with acute demyelination.  - neurology follow up noted   - 5 days of IV Solumedrol, completed course   - s/p bedside LP 5/7   - appears LE weakness on presentation likely 2/2 MS      pending dc home with home PT - needs rolling walker          Emily Flores ANP-ROEL Smith DO [facility]  Cardiovascular Medicine  [address], [address]  Office: [phone]  Cell: [phone] Patient is a 40y old  Male who presents with a chief complaint of Anemia (10 May 2021 09:21)      INTERVAL HISTORY: feels ok, dc today     REVIEW OF SYSTEMS:   CONSTITUTIONAL: No weakness  EYES/ENT: No visual changes; No throat pain  Neck: No pain or stiffness  Respiratory: No cough, wheezing, No shortness of breath  CARDIOVASCULAR: no chest pain or palpitations  GASTROINTESTINAL: No abdominal pain, no nausea, vomiting or hematemesis  GENITOURINARY: No dysuria, frequency or hematuria  NEUROLOGICAL: No stroke like symptoms  SKIN: No rashes    	  MEDICATIONS:        PHYSICAL EXAM:  T(C): 36.7 (05-11-21 @ 09:01), Max: 36.8 (05-10-21 @ 20:17)  HR: 70 (05-11-21 @ 09:01) (60 - 79)  BP: 112/70 (05-11-21 @ 09:01) (107/69 - 116/73)  RR: 18 (05-11-21 @ 09:01) (18 - 18)  SpO2: 97% (05-11-21 @ 09:01) (97% - 99%)  Wt(kg): --  I&O's Summary    10 May 2021 07:01  -  11 May 2021 07:00  --------------------------------------------------------  IN: 480 mL / OUT: 0 mL / NET: 480 mL    11 May 2021 07:01  -  11 May 2021 13:05  --------------------------------------------------------  IN: 200 mL / OUT: 0 mL / NET: 200 mL    Appearance: In no distress	  HEENT:    PERRL, EOMI	  Cardiovascular:  S1 S2, No JVD  Respiratory: Lungs clear to auscultation	  Gastrointestinal:  Soft, Non-tender, + BS	  Vascularature:  No edema of LE  Psychiatric: Appropriate affect   Neuro: no acute focal deficits                         8.5    8.24  )-----------( 128      ( 11 May 2021 06:59 )             30.6     05-11    140  |  104  |  22  ----------------------------<  110<H>  4.7   |  21<L>  |  0.85    Ca    9.7      11 May 2021 06:59    Labs personally reviewed    ASSESSMENT/PLAN: 	  Mr. Duran is a 39 y/o man with hx germinoma (brain) s/p chemo and radiation 2000, hx hemochromatosis requiring phlebotomies, has not followed up with doctors in 3 years presenting for L leg weakness/worsening foot drop pending MRI brain/spine to r/o cord compression/recurrence of germinoma, found to be microcytic anemic hb 6.1 s/p 1U PRBC 5/2 - iron panel concerning for GI malignancy/bleed vs myelodysplastic syndrome vs thalassemia (+ family hx).       Problem/Plan - 1:   ·  Problem: SOB.  Plan: Overall weakness and SOB 2/2 significant anemia of 6.1  - Transfuse PRBC to keep hb >7  - pending GI workup with egd/colonoscopy  - EKG with NSR no ischemic changes    Problem/Plan - 2:  ·  Problem: Anemia.  Plan: - admitted with hb 6.1  - clarified hemochromatosis Hx with PCP, never had anemia prior from it  - on the contrary hx hemochromatosis requiring phlebotomy every month for few years, last done ~3 years ago per pt.   - etiology of present anemia: denies melena/ hematochezia no hemoptysis/hematemesis, no hematuria. He is FOBT positive  - ? chronic slow GI blood loss anemia over time   - CT abd with moderate splenomegaly  -Iron panel with severe iron def anemia   - Heme/onc recs and follow up appreciated, ?outpt bone marrow biopsy  - EGD/ Colonoscopy with no obvious source of bleed, f/u biopsies     Problem/Plan - 3:  ·  Problem: Germinoma.  Plan: - hx germinoma (brain) dx in 2000 s/p chemo/radiation in [id number]. dx when he presented with L foot drop/weakness.   -pending CT A/P malignancy workup   - MRI head Increased signal intensity along the dorsal medulla without enhancement is nonspecific in appearance. Increasing number of foci of elevated signal intensity within the subcortical and periventricular white matter. Differential diagnosis includes ischemic, infectious/inflammatory and demyelinating processes.   - Heme/onc follow up     Problem/Plan - 4:  ·  Problem: Hemochromatosis.  Plan: - hx hemochromatosis requiring monthly phlebotomies (confirmed with PCP Dr. Ponce) - last phlebotomy 3 months ago  plan  - iron panel consistent with severe Iron def anemia, suspicious for blood loss anemia   - heme rec MRI liver and genetic testing for hemachromatosis as OP   - s/p LP 5/7-- concern for MS- f/u neuro reccs    Problem/Plan - 5:  ·  Problem: Leukopenia.  Plan: leukopenia with wbc 3.11. diff wnl.   - possible bone marrow suppression vs malignancy. afebrile, no infection sx including congestion/cough/fevers/chills/diarrhea. Less likely infection.   plan  -  daily CBC  - f/u heme onc recs    Problem/Plan - 6:  Problem: Fasciculation of tongue. Plan: - fasciulation of tongue observed on exam  - etiology: benign    Problem/Plan - 7:  ·  Problem: Nystagmus.  Plan: - horizontal nystagmus with lateral eye movements - pt states has had before, is not sure cause.   - etiology: cerebellum lesion though no dysmetria and no dyskinesia.     Problem/Plan - 8:  ·  Problem: Abnormal MRI brain.  Plan: MRI head Increased signal intensity along the dorsal medulla without enhancement is nonspecific in appearance. Increasing number of foci of elevated signal intensity within the subcortical and periventricular white matter. Differential diagnosis includes ischemic, infectious/inflammatory and demyelinating processes.   -MR Thoracic and Cervical Spine w/wo IV Cont were done and demonstrated: enhancing left cord lesion at C4-C5 most compatible with acute demyelination.  - neurology follow up noted   - 5 days of IV Solumedrol, completed course   - s/p bedside LP 5/7   - appears LE weakness on presentation likely 2/2 MS      pending dc home with home PT           Emily LACEY-ROEL Smith DO [facility]  Cardiovascular Medicine  [address], [address]  Office: [phone]  Cell: [phone]

## 2021-05-11 NOTE — DISCHARGE NOTE NURSING/CASE MANAGEMENT/SOCIAL WORK - NSDCFUADDAPPT_GEN_ALL_CORE_FT
To do on discharge:  [ ] follow up with heme/onc within 2 weeks of discharge. Tell your hematologist to consider a genetic hemochromatosis test.  [ ] follow up with neurology/MS specialist within 2 weeks of discharge. Follow up remaining CSF studies.  [ ] follow up with PCP within 2 weeks of discharge. Tell your PCP Dr. Ponce to review your hospitalization and to consider an outpatient capsule study. Tell your PCP to further evaluate your 2cm right thyroid nodule found on inpatient imaging with a outpatient ultrasound

## 2021-05-11 NOTE — PROGRESS NOTE ADULT - SUBJECTIVE AND OBJECTIVE BOX
Kiran Means Joleen, PGY1  Pager [phone]    INCOMPLETE NOTE - IN PROGRESS    Patient is a 40y old  Male who presents with a chief complaint of Anemia (10 May 2021 09:21)      SUBJECTIVE/INTERVAL EVENTS: Patient seen and examined at bedside.    MEDICATIONS  (STANDING):  ergocalciferol 31364 Unit(s) Oral <User Schedule>  ferrous    sulfate 325 milliGRAM(s) Oral two times a day  lidocaine 1% Injectable 30 milliLiter(s) Local Injection once    MEDICATIONS  (PRN):  acetaminophen   Tablet .. 650 milliGRAM(s) Oral every 6 hours PRN Mild Pain (1 - 3)  polyethylene glycol 3350 17 Gram(s) Oral daily PRN Constipation      VITAL SIGNS:  T(F): 98.2 (05-11-21 @ 04:55), Max: 98.2 (05-10-21 @ 20:17)  HR: 79 (05-11-21 @ 04:55) (60 - 79)  BP: 112/70 (05-11-21 @ 04:55) (107/69 - 116/73)  RR: 18 (05-11-21 @ 04:55) (18 - 18)  SpO2: 98% (05-11-21 @ 04:55) (97% - 99%)    I&O's Summary    09 May 2021 07:01  -  10 May 2021 07:00  --------------------------------------------------------  IN: 550 mL / OUT: 0 mL / NET: 550 mL    10 May 2021 07:01  -  11 May 2021 06:26  --------------------------------------------------------  IN: 480 mL / OUT: 0 mL / NET: 480 mL      Daily     Daily     PHYSICAL EXAM:  Gen: Alert, NAD  HEENT: NCAT, conjunctiva clear, sclera anicteric, no erythema or exudates in the oropharynx, mmm  Neck: Supple, no JVD  CV: RRR, S1S2, no m/r/g  Resp: CTAB, normal respiratory effort  Abd: Soft, nontender, nondistended, normal bowel sounds  Ext: no edema, no clubbing or cyanosis  Neuro: AOx3, CN2-12 grossly intact, JEAN  SKIN: warm, perfused    LABS:                        8.3    8.32  )-----------( 118      ( 10 May 2021 07:33 )             30.0     Hgb Trend: 8.3<--, 8.1<--, 8.5<--, 8.6<--, 7.6<--  05-09    143  |  104  |  22  ----------------------------<  146<H>  5.2   |  25  |  1.01    Ca    10.2      09 May 2021 06:40  Phos  4.2     05-09  Mg     2.3     05-09      Creatinine Trend: 1.01<--, 1.08<--, 1.09<--, 1.10<--, 1.02<--, 0.94<--    PT/INR - ( 09 May 2021 06:40 )   PT: 12.5 sec;   INR: 1.04 ratio         PTT - ( 09 May 2021 06:40 )  PTT:38.4 sec          CAPILLARY BLOOD GLUCOSE          RADIOLOGY & ADDITIONAL TESTS: Reviewed    Imaging Personally Reviewed:    Consultant(s) Notes Reviewed:      Care Discussed with Consultants/Other Providers:   Kiran Means Joleen, PGY1  Pager [phone]      Patient is a 40y old  Male who presents with a chief complaint of Anemia (10 May 2021 09:21)      SUBJECTIVE/INTERVAL EVENTS:  - Received last dose of steroids last night  - NAEON  - Patient feels well and has no acute complaints     MEDICATIONS  (STANDING):  ergocalciferol 80956 Unit(s) Oral <User Schedule>  ferrous    sulfate 325 milliGRAM(s) Oral two times a day  lidocaine 1% Injectable 30 milliLiter(s) Local Injection once    MEDICATIONS  (PRN):  acetaminophen   Tablet .. 650 milliGRAM(s) Oral every 6 hours PRN Mild Pain (1 - 3)  polyethylene glycol 3350 17 Gram(s) Oral daily PRN Constipation      VITAL SIGNS:  T(F): 98.2 (05-11-21 @ 04:55), Max: 98.2 (05-10-21 @ 20:17)  HR: 79 (05-11-21 @ 04:55) (60 - 79)  BP: 112/70 (05-11-21 @ 04:55) (107/69 - 116/73)  RR: 18 (05-11-21 @ 04:55) (18 - 18)  SpO2: 98% (05-11-21 @ 04:55) (97% - 99%)    I&O's Summary    09 May 2021 07:01  -  10 May 2021 07:00  --------------------------------------------------------  IN: 550 mL / OUT: 0 mL / NET: 550 mL    10 May 2021 07:01  -  11 May 2021 06:26  --------------------------------------------------------  IN: 480 mL / OUT: 0 mL / NET: 480 mL      Daily     Daily     PHYSICAL EXAM:  GENERAL: NAD, appearing comfortable  HEAD: Atraumatic, Normocephalic  EYES: EOMI, mild horizontal nystagmus on L lateral gaze   ENT: Moist mucous membranes  NECK: Supple  CHEST/LUNG: clear to auscultation bilaterally, no crackles/wheeze/ronchi  HEART: regular rate and rhythm, no murmurs  ABDOMEN: soft, nondistended, nontender abdomen.   EXTREMITIES:  no edema. able to move all extremities. no rashes.   NERVOUS SYSTEM: A&Ox3, CN2-12 - mild horizonal nystagmus on L lateral gaze. 5/5 strength in BLE.   SKIN: No rashes or lesions    LABS:                        8.3    8.32  )-----------( 118      ( 10 May 2021 07:33 )             30.0     Hgb Trend: 8.3<--, 8.1<--, 8.5<--, 8.6<--, 7.6<--  05-09    143  |  104  |  22  ----------------------------<  146<H>  5.2   |  25  |  1.01    Ca    10.2      09 May 2021 06:40  Phos  4.2     05-09  Mg     2.3     05-09      Creatinine Trend: 1.01<--, 1.08<--, 1.09<--, 1.10<--, 1.02<--, 0.94<--    PT/INR - ( 09 May 2021 06:40 )   PT: 12.5 sec;   INR: 1.04 ratio         PTT - ( 09 May 2021 06:40 )  PTT:38.4 sec          CAPILLARY BLOOD GLUCOSE          RADIOLOGY & ADDITIONAL TESTS: Reviewed    Imaging Personally Reviewed:    Consultant(s) Notes Reviewed:      Care Discussed with Consultants/Other Providers:

## 2021-05-11 NOTE — PROGRESS NOTE ADULT - PROBLEM SELECTOR PLAN 1
Presenting with LLE weakness/foot drop likely acute on chronic demyelinating disease. Neuro events  by time and space, c/f MS.  Other ddx include transverse myelitis iso possible malignancy given pancytopenia.  LLE weakness improving with steroids.  - C/w IV solumedrol (5/5-10), last dose tonight  - F/u CSF studies: culture negative, flow cytometry with no diagnostic findings, electrophoresis shows mildly elevated IgG  - Per neuro, no further inpatient recs. F/u outpatient with MS specialist Dr. Wendy Galo within 2 weeks of discharge at [address] [place] [address] [phone]. Presenting with LLE weakness/foot drop likely acute on chronic demyelinating disease. Neuro events  by time and space, c/f MS.  Other ddx include transverse myelitis iso possible malignancy given pancytopenia.  LLE weakness improving after steroids.  - S/p IV solumedrol (5/5-10)  - Per neuro, no further inpatient recs and will need to f/u remaining CSF studies. F/u outpatient with MS specialist Dr. Wendy Galo within 2 weeks of discharge at [address] [place] [address] [phone].

## 2021-05-12 LAB
A-TOCOPHEROL VIT E SERPL-MCNC: 4.7 MG/L — LOW (ref 7–25.1)
BETA+GAMMA TOCOPHEROL SERPL-MCNC: 0.7 MG/L — SIGNIFICANT CHANGE UP (ref 0.5–5.5)
CHROM ANALY OVERALL INTERP SPEC-IMP: SIGNIFICANT CHANGE UP
MOG AB CSF QL CBA IFA: NEGATIVE — SIGNIFICANT CHANGE UP

## 2021-05-13 PROBLEM — E83.119 HEMOCHROMATOSIS, UNSPECIFIED: Chronic | Status: ACTIVE | Noted: 2021-05-02

## 2021-05-13 PROBLEM — C80.1 MALIGNANT (PRIMARY) NEOPLASM, UNSPECIFIED: Chronic | Status: ACTIVE | Noted: 2021-05-02

## 2021-05-14 LAB
AMPHIPHYSIN AB TITR CSF: NEGATIVE TITER — SIGNIFICANT CHANGE UP
AQP4 H2O CHANNEL IGG CSF QL: NEGATIVE — SIGNIFICANT CHANGE UP
CV2 IGG TITR CSF: NEGATIVE TITER — SIGNIFICANT CHANGE UP
GLIAL NUC TYPE 1 AB TITR CSF: NEGATIVE TITER — SIGNIFICANT CHANGE UP
HU1 AB TITR CSF IF: NEGATIVE TITER — SIGNIFICANT CHANGE UP
HU2 AB TITR CSF IF: NEGATIVE TITER — SIGNIFICANT CHANGE UP
HU3 AB TITR CSF: NEGATIVE TITER — SIGNIFICANT CHANGE UP
PARANEOPLASTIC INTERPRETATION, CSF: SIGNIFICANT CHANGE UP
PCA-TR AB TITR CSF: NEGATIVE TITER — SIGNIFICANT CHANGE UP
PURKINJE CELL CYTOPLASMIC AB TYPE 2: NEGATIVE TITER — SIGNIFICANT CHANGE UP
PURKINJE CELLS AB TITR CSF IF: NEGATIVE TITER — SIGNIFICANT CHANGE UP
REFLEX ADDED: SIGNIFICANT CHANGE UP

## 2021-05-19 ENCOUNTER — APPOINTMENT (OUTPATIENT)
Dept: CARDIOLOGY | Facility: CLINIC | Age: 41
End: 2021-05-19
Payer: COMMERCIAL

## 2021-05-19 ENCOUNTER — LABORATORY RESULT (OUTPATIENT)
Age: 41
End: 2021-05-19

## 2021-05-19 ENCOUNTER — NON-APPOINTMENT (OUTPATIENT)
Age: 41
End: 2021-05-19

## 2021-05-19 VITALS
HEART RATE: 82 BPM | WEIGHT: 133 LBS | HEIGHT: 70 IN | TEMPERATURE: 98.4 F | DIASTOLIC BLOOD PRESSURE: 70 MMHG | OXYGEN SATURATION: 97 % | BODY MASS INDEX: 19.04 KG/M2 | SYSTOLIC BLOOD PRESSURE: 110 MMHG

## 2021-05-19 DIAGNOSIS — Z81.8 FAMILY HISTORY OF OTHER MENTAL AND BEHAVIORAL DISORDERS: ICD-10-CM

## 2021-05-19 DIAGNOSIS — Z83.3 FAMILY HISTORY OF DIABETES MELLITUS: ICD-10-CM

## 2021-05-19 DIAGNOSIS — Z60.2 PROBLEMS RELATED TO LIVING ALONE: ICD-10-CM

## 2021-05-19 DIAGNOSIS — D56.3 THALASSEMIA MINOR: ICD-10-CM

## 2021-05-19 DIAGNOSIS — Z86.2 PERSONAL HISTORY OF DISEASES OF THE BLOOD AND BLOOD-FORMING ORGANS AND CERTAIN DISORDERS INVOLVING THE IMMUNE MECHANISM: ICD-10-CM

## 2021-05-19 DIAGNOSIS — R79.1 ABNORMAL COAGULATION PROFILE: ICD-10-CM

## 2021-05-19 DIAGNOSIS — Z78.9 OTHER SPECIFIED HEALTH STATUS: ICD-10-CM

## 2021-05-19 DIAGNOSIS — Z80.8 FAMILY HISTORY OF MALIGNANT NEOPLASM OF OTHER ORGANS OR SYSTEMS: ICD-10-CM

## 2021-05-19 PROCEDURE — 93000 ELECTROCARDIOGRAM COMPLETE: CPT

## 2021-05-19 PROCEDURE — 99204 OFFICE O/P NEW MOD 45 MIN: CPT

## 2021-05-19 PROCEDURE — 99072 ADDL SUPL MATRL&STAF TM PHE: CPT

## 2021-05-19 RX ORDER — ERGOCALCIFEROL 1.25 MG/1
1.25 MG CAPSULE, LIQUID FILLED ORAL
Refills: 0 | Status: ACTIVE | COMMUNITY
Start: 2021-05-19

## 2021-05-19 SDOH — SOCIAL STABILITY - SOCIAL INSECURITY: PROBLEMS RELATED TO LIVING ALONE: Z60.2

## 2021-05-19 NOTE — PHYSICAL EXAM
[No Acute Distress] : no acute distress [Well Nourished] : well nourished [Well Developed] : well developed [Well-Appearing] : well-appearing [Normal Sclera/Conjunctiva] : normal sclera/conjunctiva [PERRL] : pupils equal round and reactive to light [EOMI] : extraocular movements intact [Normal Outer Ear/Nose] : the outer ears and nose were normal in appearance [Normal Oropharynx] : the oropharynx was normal [No JVD] : no jugular venous distention [No Lymphadenopathy] : no lymphadenopathy [Supple] : supple [Thyroid Normal, No Nodules] : the thyroid was normal and there were no nodules present [No Respiratory Distress] : no respiratory distress  [No Accessory Muscle Use] : no accessory muscle use [Clear to Auscultation] : lungs were clear to auscultation bilaterally [Normal Rate] : normal rate  [Regular Rhythm] : with a regular rhythm [Normal S1, S2] : normal S1 and S2 [No Carotid Bruits] : no carotid bruits [No Abdominal Bruit] : a ~M bruit was not heard ~T in the abdomen [No Varicosities] : no varicosities [Pedal Pulses Present] : the pedal pulses are present [No Edema] : there was no peripheral edema [No Palpable Aorta] : no palpable aorta [No Extremity Clubbing/Cyanosis] : no extremity clubbing/cyanosis [Soft] : abdomen soft [Non Tender] : non-tender [Non-distended] : non-distended [No Masses] : no abdominal mass palpated [No HSM] : no HSM [Normal Bowel Sounds] : normal bowel sounds [Normal Posterior Cervical Nodes] : no posterior cervical lymphadenopathy [Normal Anterior Cervical Nodes] : no anterior cervical lymphadenopathy [No CVA Tenderness] : no CVA  tenderness [No Spinal Tenderness] : no spinal tenderness [No Joint Swelling] : no joint swelling [Grossly Normal Strength/Tone] : grossly normal strength/tone [No Rash] : no rash [Coordination Grossly Intact] : coordination grossly intact [No Focal Deficits] : no focal deficits [Normal Gait] : normal gait [Deep Tendon Reflexes (DTR)] : deep tendon reflexes were 2+ and symmetric [Normal Affect] : the affect was normal [Normal Insight/Judgement] : insight and judgment were intact [de-identified] : 1/6 systolic murmur apex

## 2021-05-19 NOTE — HISTORY OF PRESENT ILLNESS
[FreeTextEntry1] : Here for hospital follow-up. [de-identified] : Timothy is a 41yo male with hx germinoma (brain) s/p chemo and radiation 2000, hemochromatosis requiring phlebotomies, and thyroid nodule who presents to the office today for hospital follow-up. Patient reports he was admitted to [facility] from 5/2-5/11 for L leg weakness /worsening foot drop x 1 week, found to be anemic to 6.1 with mild pancytopenia. EGD and colonoscopy performed inpatient with no source of GI bleed found and recommended to f.u with GI outpatient for capsule study. Patient had Brain and spine imaging done while inpatient which showed findings in the brain and the spinal cord concerning for CNS demyelinating disease such as Multiple Sclerosis. Received IV steroid x 5 days with improvement in LLE weakness, pt has appt to f/u with Dr. Wendy Stack MS specialist in 2 weeks.

## 2021-05-21 ENCOUNTER — APPOINTMENT (OUTPATIENT)
Dept: CARDIOLOGY | Facility: CLINIC | Age: 41
End: 2021-05-21
Payer: COMMERCIAL

## 2021-05-21 ENCOUNTER — APPOINTMENT (OUTPATIENT)
Dept: NEUROLOGY | Facility: CLINIC | Age: 41
End: 2021-05-21
Payer: COMMERCIAL

## 2021-05-21 VITALS
BODY MASS INDEX: 19.33 KG/M2 | HEIGHT: 70 IN | SYSTOLIC BLOOD PRESSURE: 114 MMHG | WEIGHT: 135 LBS | DIASTOLIC BLOOD PRESSURE: 73 MMHG | HEART RATE: 96 BPM

## 2021-05-21 PROCEDURE — 99215 OFFICE O/P EST HI 40 MIN: CPT

## 2021-05-21 PROCEDURE — 93306 TTE W/DOPPLER COMPLETE: CPT

## 2021-05-21 PROCEDURE — 99072 ADDL SUPL MATRL&STAF TM PHE: CPT

## 2021-05-25 ENCOUNTER — APPOINTMENT (OUTPATIENT)
Dept: GASTROENTEROLOGY | Facility: CLINIC | Age: 41
End: 2021-05-25
Payer: COMMERCIAL

## 2021-05-25 VITALS
SYSTOLIC BLOOD PRESSURE: 108 MMHG | WEIGHT: 135 LBS | TEMPERATURE: 97.8 F | HEIGHT: 70 IN | DIASTOLIC BLOOD PRESSURE: 66 MMHG | BODY MASS INDEX: 19.33 KG/M2 | HEART RATE: 81 BPM

## 2021-05-25 DIAGNOSIS — R16.1 SPLENOMEGALY, NOT ELSEWHERE CLASSIFIED: ICD-10-CM

## 2021-05-25 DIAGNOSIS — Z87.442 PERSONAL HISTORY OF URINARY CALCULI: ICD-10-CM

## 2021-05-25 DIAGNOSIS — E83.119 HEMOCHROMATOSIS, UNSPECIFIED: ICD-10-CM

## 2021-05-25 DIAGNOSIS — C80.1 MALIGNANT (PRIMARY) NEOPLASM, UNSPECIFIED: ICD-10-CM

## 2021-05-25 PROCEDURE — 82274 ASSAY TEST FOR BLOOD FECAL: CPT | Mod: QW

## 2021-05-25 PROCEDURE — 99072 ADDL SUPL MATRL&STAF TM PHE: CPT

## 2021-05-25 PROCEDURE — 99205 OFFICE O/P NEW HI 60 MIN: CPT

## 2021-05-25 NOTE — REVIEW OF SYSTEMS
[Shortness Of Breath] : shortness of breath [Limb Weakness] : limb weakness [Difficulty Walking] : difficulty walking [Negative] : Heme/Lymph

## 2021-05-26 LAB
25(OH)D3 SERPL-MCNC: 24.2 NG/ML
ALBUMIN SERPL ELPH-MCNC: 4.7 G/DL
ALP BLD-CCNC: 58 U/L
ALT SERPL-CCNC: 20 U/L
ANION GAP SERPL CALC-SCNC: 11 MMOL/L
APPEARANCE: CLEAR
APTT BLD: 39.8 SEC
AST SERPL-CCNC: 22 U/L
BACTERIA: NEGATIVE
BASOPHILS # BLD AUTO: 0.03 K/UL
BASOPHILS NFR BLD AUTO: 0.3 %
BILIRUB SERPL-MCNC: 1.1 MG/DL
BILIRUBIN URINE: NEGATIVE
BLOOD URINE: NORMAL
BUN SERPL-MCNC: 12 MG/DL
CALCIUM OXALATE CRYSTALS: ABNORMAL
CALCIUM SERPL-MCNC: 9.8 MG/DL
CHLORIDE SERPL-SCNC: 104 MMOL/L
CHOLEST SERPL-MCNC: 125 MG/DL
CO2 SERPL-SCNC: 23 MMOL/L
COLOR: NORMAL
CREAT SERPL-MCNC: 1 MG/DL
EOSINOPHIL # BLD AUTO: 0.05 K/UL
EOSINOPHIL NFR BLD AUTO: 0.5 %
ESTIMATED AVERAGE GLUCOSE: 85 MG/DL
FERRITIN SERPL-MCNC: 56 NG/ML
FOLATE SERPL-MCNC: 10.6 NG/ML
GLUCOSE QUALITATIVE U: NEGATIVE
GLUCOSE SERPL-MCNC: 95 MG/DL
HBA1C MFR BLD HPLC: 4.6 %
HCT VFR BLD CALC: 33.2 %
HDLC SERPL-MCNC: 56 MG/DL
HGB BLD-MCNC: 8.9 G/DL
HYALINE CASTS: 0 /LPF
IMM GRANULOCYTES NFR BLD AUTO: 0.8 %
INR PPP: 0.97 RATIO
IRON SATN MFR SERPL: 13 %
IRON SERPL-MCNC: 53 UG/DL
KETONES URINE: NEGATIVE
LDLC SERPL CALC-MCNC: 55 MG/DL
LEUKOCYTE ESTERASE URINE: NEGATIVE
LYMPHOCYTES # BLD AUTO: 1.3 K/UL
LYMPHOCYTES NFR BLD AUTO: 13.1 %
MAN DIFF?: NORMAL
MCHC RBC-ENTMCNC: 16.7 PG
MCHC RBC-ENTMCNC: 26.8 GM/DL
MCV RBC AUTO: 62.3 FL
MICROSCOPIC-UA: NORMAL
MONOCYTES # BLD AUTO: 0.71 K/UL
MONOCYTES NFR BLD AUTO: 7.1 %
NEUTROPHILS # BLD AUTO: 7.79 K/UL
NEUTROPHILS NFR BLD AUTO: 78.2 %
NITRITE URINE: NEGATIVE
NONHDLC SERPL-MCNC: 68 MG/DL
PH URINE: 5.5
PLATELET # BLD AUTO: 208 K/UL
POTASSIUM SERPL-SCNC: 5 MMOL/L
PROT SERPL-MCNC: 7.1 G/DL
PROTEIN URINE: NORMAL
PSA SERPL-MCNC: 1.95 NG/ML
PT BLD: 11.6 SEC
RBC # BLD: 5.33 M/UL
RBC # FLD: NORMAL
RED BLOOD CELLS URINE: 2 /HPF
SODIUM SERPL-SCNC: 138 MMOL/L
SPECIFIC GRAVITY URINE: 1.02
SQUAMOUS EPITHELIAL CELLS: 0 /HPF
T4 FREE SERPL-MCNC: 1.1 NG/DL
TIBC SERPL-MCNC: 407 UG/DL
TRIGL SERPL-MCNC: 65 MG/DL
TSH SERPL-ACNC: 2.06 UIU/ML
UIBC SERPL-MCNC: 354 UG/DL
UROBILINOGEN URINE: NORMAL
VIT B12 SERPL-MCNC: 328 PG/ML
WBC # FLD AUTO: 9.96 K/UL
WHITE BLOOD CELLS URINE: 1 /HPF

## 2021-05-27 ENCOUNTER — NON-APPOINTMENT (OUTPATIENT)
Age: 41
End: 2021-05-27

## 2021-05-27 NOTE — CONSULT LETTER
[Dear  ___] : Dear  [unfilled], [Consult Letter:] : I had the pleasure of evaluating your patient, [unfilled]. [( Thank you for referring [unfilled] for consultation for _____ )] : Thank you for referring [unfilled] for consultation for [unfilled] [Please see my note below.] : Please see my note below. [Consult Closing:] : Thank you very much for allowing me to participate in the care of this patient.  If you have any questions, please do not hesitate to contact me. [Sincerely,] : Sincerely, [DrWanda  ___] : Dr. PEREZ [FreeTextEntry3] : Andrae Quevedo MD

## 2021-05-27 NOTE — ASSESSMENT
[FreeTextEntry1] : 1.  Anemia - currently negative for occult blood in the stool.  There is no evidence for GI blood loss today, and I did not find any evidence of his blood in the stool in the extensive records that I reviewed.  The splenomegaly may very well be associated with the patient's anemia, and he may have a hematologic cause for the significant drop in his blood count.\par 2.  Splenomegaly.\par 3.  Multiple sclerosis.\par 4.  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  Hemochromatosis-status post phlebotomies in the past, but none recently.\par 6.  History of kidney stones.\par 7.  Right thyroid nodule-needs evaluation.\par 8.  Vitamin D deficiency.\par 9.  Positive COVID-19 antibodies.\par 10.  Diverticulosis coli.\par 11.  Fundic polyps.\par \par Plan:\par 1.  At this point, I think the primary focus should be a hematologic evaluation by Dr. Ortiz.  If it is determined that there is definite evidence for GI blood loss, then a video capsule will be performed.  The patient has an appointment to see Dr. Ortiz in 2 days.\par 2.  The video capsule procedure was explained to the patient and his sister and appropriate literature was dispensed.\par 3.  Follow-up for the thyroid nodule, splenomegaly, multiple sclerosis and hemochromatosis is necessary, and this was discussed at length with the patient and his sister.

## 2021-05-27 NOTE — HISTORY OF PRESENT ILLNESS
[FreeTextEntry1] : This 40-year-old gentleman is referred for evaluation for possible capsule endoscopy.  The patient's history is very complicated, and he is accompanied by his sister.  On May 2, 2021, the patient felt very weak, and he went to [facility] where he was found to be very anemic.  His hemoglobin was 6.1.  Reportedly, at some point, someone told him that he had blood in his stool.  He was transfused in the hospital, and the time of discharge, his hemoglobin = 8.5, hematocrit = 30.6.  Upper endoscopy revealed fundic polyps and colonoscopy revealed diverticulosis.  The patient does not remember seeing blood in his stool or having black stool.  He has a history of a germinoma of the brain diagnosed in  and treated with chemotherapy and radiation therapy.  He also has hemochromatosis, diagnosed in .  He was treated with phlebotomies by Dr. Reno Ortiz, and the phlebotomies stopped in .  While in the hospital, the patient was diagnosed with moderate splenomegaly on CT scan.  No esophageal varices were detected on upper endoscopy.  He also had a neurological evaluation, and he was diagnosed with multiple sclerosis.  He had left-sided weakness, which apparently had gotten significantly worse in the week prior to admission.  He was diagnosed with vitamin D deficiency, and is currently on vitamin D supplementation.  He had lithotripsy in  for kidney stones by Dr. Arteaga.  He was also diagnosed with a right thyroid nodule.  The patient's mother is healthy.  His father  secondary to Alzheimer's disease.  His 2 sisters are healthy.  He is single.  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  He does not smoke, use drugs or drink alcohol.  No allergies are known.  While in [facility], it was detected that he had COVID-19 antibodies.  He does not recall having had a COVID-19 infection.

## 2021-05-27 NOTE — PHYSICAL EXAM
[General Appearance - Alert] : alert [General Appearance - In No Acute Distress] : in no acute distress [General Appearance - Well Developed] : well developed [General Appearance - Well-Appearing] : healthy appearing [Sclera] : the sclera and conjunctiva were normal [PERRL With Normal Accommodation] : pupils were equal in size, round, and reactive to light [Extraocular Movements] : extraocular movements were intact [Strabismus] : no strabismus was seen [Optic Disc Abnormality] : the optic disc were normal in size and color [Retina] : no retinal hemorrhages, vessel changes or exudates seen on fundoscopic exam [Outer Ear] : the ears and nose were normal in appearance [Examination Of The Oral Cavity] : the lips and gums were normal [Both Tympanic Membranes Were Examined] : both tympanic membranes were normal [Nasal Cavity] : the nasal mucosa and septum were normal [Oropharynx] : the oropharynx was normal [Neck Appearance] : the appearance of the neck was normal [Neck Cervical Mass (___cm)] : no neck mass was observed [Jugular Venous Distention Increased] : there was no jugular-venous distention [Auscultation Breath Sounds / Voice Sounds] : lungs were clear to auscultation bilaterally [Lungs Percussion] : the lungs were normal to percussion [Heart Rate And Rhythm] : heart rate was normal and rhythm regular [Heart Sounds] : normal S1 and S2 [Heart Sounds Gallop] : no gallops [Murmurs] : no murmurs [Heart Sounds Pericardial Friction Rub] : no pericardial rub [Arterial Pulses Carotid] : carotid pulses were normal with no bruits [Abdominal Aorta] : the abdominal aorta was normal [Full Pulse] : the pedal pulses are present [Edema] : there was no peripheral edema [Veins - Varicosity Changes] : there were no varicosital changes [Bowel Sounds] : normal bowel sounds [Abdomen Soft] : soft [Abdomen Tenderness] : non-tender [Abdomen Mass (___ Cm)] : no abdominal mass palpated [Abdomen Hernia] : no hernia was discovered [Normal Sphincter Tone] : normal sphincter tone [No Rectal Mass] : no rectal mass [Penis Abnormality] : the penis was normal [Scrotum] : the scrotum was normal [Testes Swelling] : the testicles were not swollen [Testes Mass (___cm)] : there were no testicular masses [Cervical Lymph Nodes Enlarged Posterior Bilaterally] : posterior cervical [Cervical Lymph Nodes Enlarged Anterior Bilaterally] : anterior cervical [Supraclavicular Lymph Nodes Enlarged Bilaterally] : supraclavicular [Axillary Lymph Nodes Enlarged Bilaterally] : axillary [Femoral Lymph Nodes Enlarged Bilaterally] : femoral [Inguinal Lymph Nodes Enlarged Bilaterally] : inguinal [No CVA Tenderness] : no ~M costovertebral angle tenderness [No Spinal Tenderness] : no spinal tenderness [Nail Clubbing] : no clubbing  or cyanosis of the fingernails [Musculoskeletal - Swelling] : no joint swelling seen [Skin Color & Pigmentation] : normal skin color and pigmentation [Skin Turgor] : normal skin turgor [] : no rash [Skin Lesions] : no skin lesions [Oriented To Time, Place, And Person] : oriented to person, place, and time [Impaired Insight] : insight and judgment were intact [Affect] : the affect was normal [Mood] : the mood was normal [Occult Blood Positive] : stool was negative for occult blood [FreeTextEntry1] : Stool brown, negative Hemoccult, negative iFOBT

## 2021-06-01 NOTE — ASSESSMENT
[FreeTextEntry1] : Assessment/Plan:\par  40 year male with a hx of brain germinoma s/p radiation/chemotherapy 2000, long standing hx of Hemochromatosis requiring routine phlebotomies, was admitted to [facility] 5/2/2021-5/11/2021 for worsening baseline LLE weakness with foot drop and found to have lesions on brain and spine MRI consistent with demyelinating lesions with active cord lesion at C4-C5. A spinal tap confirmed the presence of 5 unique oligoclonal bands and elevated IgG index, and normal cell count, protein and CSF NMO and MOG normal. He was treated with 5 days of IV steroids with modest improvement in symptoms. Of note, hospital admission also notable for new onset pancytopenia of unclear etiology, possibly diagnosed with beta thalassemia minor and started on iron therapy. \par \par Neurological presentation consistent with relapsing Multiple sclerosis. However, would need to obtain more clarification on present hematological disorders. \par \par \par Plan: \par 1. Diagnostic Plan/Imaging: \par [] Will plan to repeat MRI brain and cervical spine w/w/o in 6 months\par [] Will order labs for MS mimickers, including NMO and MOG serum antibody and rheumatological serologies\par \par 2. Disease Modifying therapy plan: Will address at next visit\par \par 3. Symptomatic therapy plan:\par () Fatigue; Will continue to follow\par () Spasticity: Will continue monitor\par () Neuropathic pain: Will continue to monitor\par () Bladder Dysfunction- no present issues\par () Mobility: Left foot drop- referred to physical therapy\par () Vitamin D3 and B12 supplementations \par \par Return to clinic 2 weeks\par \par - Will plan to discuss hem/onc diagnosis and further management with Dr. Ortiz -\par \par Available imaging studies and/or blood work up were reviewed. A detailed chart review was completed prior to visit.\par \par The above plan was discussed with ARLIN YO in great detail.  ARLIN YO verbalized understanding and agrees with plan as detailed above. Patient was provided education and counselling on current diagnosis/symptoms, diagnostic work up, treatment options and potential side effects of any prescribed therapy/therapies. He was advised to call our clinic at [phone] for any new or worsening symptoms, or with any questions or concerns. In case of acute onset of neurological symptoms or worsening presentation, patient was advised to present to nearest emergency room for further evaluation. ARLIN YO expressed understanding and all his questions/concerns were addressed.\par \par Wendy Galo M.D\par

## 2021-06-01 NOTE — HISTORY OF PRESENT ILLNESS
[FreeTextEntry1] : HPI (initial visit May 21, 2021)- Timothy is a 41 yo man with a hx of brain germinoma s/p radiation/chemotherapy 2000, [facility]-5/11/2021 for worsening baseline LLE weakness. MRI brain and spine were obtained. MRI brain showed non enhancing scattered subcortical and periventricular white matter lesions and a dorsal medulla lesion. MRI spine showed scattered non enhancing short segment cord lesions in cervical and thoracic cord and also involving conus, an enhancing left cord lesion was noted at C4-C5. A spinal tap was performed and showed 2 cells, protein 30, 5 unique Oligoclonal bands with elevated IgG index, MOG and NMO in CSF neg, paraneoplastic panel neg and CSF PCR neg. Serum RICHARD, RF neg. B12 363. COVID PCR negative. UDS negative. \par \par He was treated with 5 days of IV solumedrol with improvement in symptoms on discharge from hospital. \par \par Of note, patient also has a long standing hx of hemochromatosis requiring phlebotomies. In the hospital it was noted that he had anemia (Hb 6.1), Lymphopenia (2.44) and thrombocytopenia (109) of unclear etiology and is to follow up with hematology/oncology. He was found to have low reticulocyte count and a bone marrow biopsy is planned as outpatient for possible myelodysplastic syndrome. 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  Also noted to have elevated PTT and mildy low levels of factor 8 and 9. Also found to have low ferritin and + FOBT and endoscopy and colonoscopy did not reveal any source of bleed. \par \par most recent CBC 5/19/2021:-\par wbc 9.96, hb 8.9, platelet 208, absolute lymph 1.30 k/ul\par CMP normal\par Serum iron normal. % saturation low (13)\par Ferritin normal\par \par He returns for follow up appointment and is accompanied by his two sisters. He reports the left foot drop has not significantly improved. He continues to drag his left leg and foot while he walks and scuff the foot against the ground when walking. He reports the weakness in left leg had initially started after his diagnosis of germinoma. He does not recall having a MRI of the spine at the time. He also recalls that about 4 years ago he had trouble with his vision, where whenever he would look to the left he would not be able to focus on objects. He adds that he has not been feeling well for several months and has had low energy. \par

## 2021-06-01 NOTE — DATA REVIEWED
[de-identified] : MRI brain 5/3/2021:-\par “Brain MRI: Unchanged encephalomalacia and gliosis within the genuine of the corpus callosum.\par \par Increased signal intensity along the dorsal medulla without enhancement is nonspecific in appearance. Increasing number of foci of elevated signal intensity within the subcortical and periventricular white matter. Differential diagnosis includes ischemic, infectious/inflammatory and demyelinating processes. Cervical spine MRI with contrast may be valuable for further evaluation.\par \par New small inferior left frontal meningioma.”\par \par \par MRI cervical and thoracic spine 5/5/2021:-\par “Enhancing, T2 hyperintense, and slightly expansile lesion is noted in the cord at left C4-C5.\par \par Additional T2 hyperintense but nonenhancing lesions are identified throughout the spinal cord at the dorsal aspect of the medulla, right hemicord at C1 level, left hemicord at C3, left hemicord at T1, right hemicord at T3-T4, right ventral cord at T11-T12, and right hemicord at T12.\par IMPRESSION:\par \par -Enhancing left cord lesion at C4-C5 most compatible with acute demyelination. Less likely consideration is metastasis in this patient with history of germinoma. Multiple additional nonenhancing lesions throughout the spinal cord, most compatible with chronic demyelination. Correlate with symptomatology and CSF analysis”\par \par \par MRI Lumbar spine 5/7/2021:-\par “IMPRESSION: Evidence of T2 hyperintensity in the right aspect of the cord at T11-12 as well as more inferiorly at T12-L1 level extending to the tip of the conus without associated enhancement or expansion of the cord likely chronic demyelinating pathology.”\par \par

## 2021-06-01 NOTE — PHYSICAL EXAM
[FreeTextEntry1] : PHYSICAL EXAM\par Constitutional: Alert, no acute distress \par Neck: Full range of motion\par Psychiatric: appropriate affect and mood\par Pulmonary: No respiratory distress, stable on room air\par \par NEUROLOGICAL EXAM\par Mental status: The patient is alert, attentive, and oriented.\par Speech/language: Clear and fluent with good repetition, comprehension, and naming\par Cranial nerves:\par CN II: Visual fields are full to confrontation. Pupil size equal and briskly reactive to light. No RAPD. No red color desaturation\par CN III, IV, VI: Mild left LISA, no ptosis\par CN V: Facial sensation is intact to pinprick in all 3 divisions bilaterally.\par CN VII: Face is symmetric with normal eye closure and smile.\par CN VII: Hearing is normal to rubbing fingers\par CN IX, X: Palate elevates symmetrically. Phonation is normal.\par CN XI: Head turning and shoulder shrug are intact\par CN XII: Tongue is midline with normal movements and no atrophy.\par Motor: There is no pronator drift of out-stretched arms. Muscle bulk and tone are normal. 5/5 b/l UE, 5/5 RLE, 5/5 proximal LLE and distally dorsiflexion 4+/5.\par Reflexes: more brisk on the left compared to the right with 2 beat clonus on the left. NO Babinski. No Thrasher\par Sensory: Intact to light touch and pinprick in all extremities. Joint proprioception  intact in toes bilaterally. Vibration 8 seconds at right big toe and 15 seconds at right knee, 6 seconds at left big toe and 10 seconds at left knee.\par Coordination: slight dysmetria on finger to nose on the left\par Gait/Stance: high steepage gait on left with frequent catching of left toes on the ground\par \par \par \par \par

## 2021-06-02 ENCOUNTER — LABORATORY RESULT (OUTPATIENT)
Age: 41
End: 2021-06-02

## 2021-06-02 ENCOUNTER — APPOINTMENT (OUTPATIENT)
Dept: ENDOCRINOLOGY | Facility: CLINIC | Age: 41
End: 2021-06-02
Payer: COMMERCIAL

## 2021-06-02 VITALS
TEMPERATURE: 97.9 F | WEIGHT: 136 LBS | OXYGEN SATURATION: 99 % | DIASTOLIC BLOOD PRESSURE: 75 MMHG | SYSTOLIC BLOOD PRESSURE: 110 MMHG | BODY MASS INDEX: 19.47 KG/M2 | HEIGHT: 70 IN | HEART RATE: 73 BPM

## 2021-06-02 DIAGNOSIS — R76.8 OTHER SPECIFIED ABNORMAL IMMUNOLOGICAL FINDINGS IN SERUM: ICD-10-CM

## 2021-06-02 DIAGNOSIS — U07.1 COVID-19: ICD-10-CM

## 2021-06-02 LAB
HBV CORE IGG+IGM SER QL: NONREACTIVE
HBV CORE IGM SER QL: NONREACTIVE
HBV SURFACE AB SER QL: REACTIVE
HBV SURFACE AG SER QL: NONREACTIVE
TSH SERPL-ACNC: 1.92 UIU/ML

## 2021-06-02 PROCEDURE — 76536 US EXAM OF HEAD AND NECK: CPT

## 2021-06-02 PROCEDURE — 99204 OFFICE O/P NEW MOD 45 MIN: CPT | Mod: 25

## 2021-06-02 PROCEDURE — 99072 ADDL SUPL MATRL&STAF TM PHE: CPT

## 2021-06-02 NOTE — HISTORY OF PRESENT ILLNESS
[FreeTextEntry1] : Mr. YO  is a 40 year old male  who presents for endocrine consultation. He presents via the kind courtesy of Dr. Romulo Ponce. . He  presents with regard to a history of thyroid nodule.\par Denies anterior neck discomfort, difficulty swallowing, or any change in the appearance of the neck region. \par Recent TFTs wnl. 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 To has hx of kidney stones and vitamin d deficiency along with hx of Multiple Sclerosis.\par Recently at [facility] with sever anemia s/p blood transfusions.  Too, noted to have pos covid spike ab's but did not know that he had covid prior. Patient has not received COVID vaccine. \par Etiology for anemia is still unknown, to go for small capsule study with GI. \par In general, patient feels reasonably well\par \par PMD: Dr. Ponce. \par FMHx: sister hx of hypothyroid.

## 2021-06-02 NOTE — IMPRESSION
[FreeTextEntry1] : Heterogenous thyrodi with 2 dominant nodules rt lobe.  Given tirads findings-have suggested US guided fna of each rt sided nodule. she has agreed to stay off any blood thinning products for one week prior to the planned FNA, and will then follow up here about 10 days subsequent to the procedure for a review of the results. Too, although potential adverse effects of the fna will be reviewed with her by the performing physician, I did review the small risk for infection and of bleeding that can occur.\par \par

## 2021-06-02 NOTE — PROCEDURE
[Markr e 2008 model, 10-12 MHz frequencies] : multiple real time longitudinal and transverse images were obtained using a high resolution ultrasound with a linear transducer, Markr e 2008 model, 10-12 MHz frequencies. All measurements will be reported as longitudinal x ziggy-posterior x transverse. [Other: ___] : [unfilled] [] : a heterogeneous parenchyma [Lower] : lower pole there is a  [Mixed] : mixed [No] : does not have a halo [Macrocalcifications] : macrocalcifications [Right Thyroid] : right [Mid] : mid pole there is a  [Isoechoic w/ hypoechoic foci] : isoechoic, with an internal hypoechoic foci nodule [Round] : round in shape [Regular] : regular [No calcifications] : no calcifications [Intense internal vascularity] : intense internal vascularity [de-identified] : thyroid nodule found on imaging at [facility]  [FreeTextEntry1] : 3.43 x 1.98 x 3.09  [FreeTextEntry5] : 2.51 x 1.53 x 0.90  [FreeTextEntry2] : 0.27  [FreeTextEntry3] : 2.50 x 1.74 x 2.07

## 2021-06-02 NOTE — PROCEDURE
[[employer] e 2008 model, 10-12 MHz frequencies] : multiple real time longitudinal and transverse images were obtained using a high resolution ultrasound with a linear transducer, [employer] e 2008 model, 10-12 MHz frequencies. All measurements will be reported as longitudinal x ziggy-posterior x transverse. [Other: ___] : [unfilled] [] : a heterogeneous parenchyma [Lower] : lower pole there is a  [Mixed] : mixed [No] : does not have a halo [Macrocalcifications] : macrocalcifications [Right Thyroid] : right [Mid] : mid pole there is a  [Isoechoic w/ hypoechoic foci] : isoechoic, with an internal hypoechoic foci nodule [Round] : round in shape [Regular] : regular [No calcifications] : no calcifications [Intense internal vascularity] : intense internal vascularity [de-identified] : thyroid nodule found on imaging at [facility]  [FreeTextEntry1] : 3.43 x 1.98 x 3.09  [FreeTextEntry5] : 2.51 x 1.53 x 0.90  [FreeTextEntry2] : 0.27  [FreeTextEntry3] : 2.50 x 1.74 x 2.07

## 2021-06-02 NOTE — HISTORY OF PRESENT ILLNESS
[FreeTextEntry1] : Mr. YO  is a 40 year old male  who presents for endocrine consultation. He presents via the kind courtesy of Dr. Romulo Ponce. . He  presents with regard to a history of thyroid nodule.\par Denies anterior neck discomfort, difficulty swallowing, or any change in the appearance of the neck region. \par Recent TFTs wnl. 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 To has hx of kidney stones and vitamin d deficiency along with hx of Multiple Sclerosis.\par Recently at [facility] with sever anemia s/p blood transfusions.  Too, noted to have pos covid spike ab's but did not know that he had covid prior. Patient has not received COVID vaccine. \par Etiology for anemia is still unknown, to go for small capsule study with GI. \par In general, patient feels reasonably well\par \par PMD: Dr. Ponce. \par FMHx: sister hx of hypothyroid.

## 2021-06-03 LAB
ACE BLD-CCNC: 28 U/L
B BURGDOR AB SER-IMP: NEGATIVE
B BURGDOR IGG+IGM SER QL: 0.68 INDEX
DEPRECATED KAPPA LC FREE/LAMBDA SER: 0.91 RATIO
DSDNA AB SER-ACNC: 13 IU/ML
ENA SS-A AB SER IA-ACNC: <0.2 AL
ENA SS-B AB SER IA-ACNC: <0.2 AL
IGA SER QL IEP: 132 MG/DL
IGG SER QL IEP: 905 MG/DL
IGM SER QL IEP: 347 MG/DL
KAPPA LC CSF-MCNC: 2.06 MG/DL
KAPPA LC SERPL-MCNC: 1.88 MG/DL
VZV AB TITR SER: POSITIVE
VZV IGG SER IF-ACNC: 2989 INDEX

## 2021-06-04 ENCOUNTER — APPOINTMENT (OUTPATIENT)
Dept: NEUROLOGY | Facility: CLINIC | Age: 41
End: 2021-06-04
Payer: COMMERCIAL

## 2021-06-04 VITALS
DIASTOLIC BLOOD PRESSURE: 73 MMHG | BODY MASS INDEX: 19.33 KG/M2 | WEIGHT: 135 LBS | HEART RATE: 69 BPM | HEIGHT: 70 IN | SYSTOLIC BLOOD PRESSURE: 106 MMHG

## 2021-06-04 LAB
AQP4 H2O CHANNEL AB SERPL IA-ACNC: <1.5 U/ML
M TB IFN-G BLD-IMP: NEGATIVE
QUANTIFERON TB PLUS MITOGEN MINUS NIL: 9.01 IU/ML
QUANTIFERON TB PLUS NIL: 0.01 IU/ML
QUANTIFERON TB PLUS TB1 MINUS NIL: 0 IU/ML
QUANTIFERON TB PLUS TB2 MINUS NIL: 0 IU/ML
VZV IGM SER IF-ACNC: 2.44 INDEX

## 2021-06-04 PROCEDURE — 99214 OFFICE O/P EST MOD 30 MIN: CPT

## 2021-06-04 PROCEDURE — 99072 ADDL SUPL MATRL&STAF TM PHE: CPT

## 2021-06-04 RX ORDER — FERROUS SULFATE TAB 325 MG (65 MG ELEMENTAL FE) 325 (65 FE) MG
325 (65 FE) TAB ORAL
Qty: 60 | Refills: 0 | Status: ACTIVE | COMMUNITY
Start: 2021-05-11

## 2021-06-04 NOTE — ASSESSMENT
[FreeTextEntry1] : Assessment/Plan:\par  40 year male with a hx of brain germinoma s/p radiation/chemotherapy 2000, hx of beta thalassemia with secondary Hemochromatosis requiring phlebotomies, was admitted to [facility] 5/2/2021-5/11/2021 for worsening baseline LLE weakness with foot drop and found to have lesions on brain and spine MRI consistent with demyelinating lesions with active cord lesion at C4-C5. A spinal tap confirmed the presence of 5 unique oligoclonal bands and elevated IgG index, and normal cell count, protein and CSF NMO and MOG normal. He was treated with 5 days of IV steroids with modest improvement in symptoms. \par \par Neurological presentation consistent with relapsing Multiple sclerosis. \par \par I discussed the pathophysiology of Multiple Sclerosis, disease course and management. We discussed the different options for disease modifying therapy. I explained to her the goal of treatment is to prevent any new clinical relapses, new lesions on MRI scans and to slow down disease progression/disability. We also discussed symptom management for hx of spasticity, neuropathic pain, bladder symptoms, fatigue etc.\par \par \par Plan: \par 1. Diagnostic Plan/Imaging: \par [] Will plan to repeat MRI brain and cervical spine w/w/o in 6 months\par [] Advised to provide me with prior MRI brain and spine scans from the time of the diagnosis of Germinoma. \par [] Serum MOG serum antibody and JCV virus antibody testing\par [] Will repeat VZV IgM and immunoglobulin panel in 2 weeks\par \par 2. Disease Modifying therapy plan: .\par Will sign start form for Vumerity:\par CBC and LFT prior to initiation of therapy, then every 6 months.\par Starting dose(capsules):  231 mg BID for 7 days followed by 462 mg  (2 capsules) BID (maintenance dose). \par May take Vumerity with or without food\par Discussed common adverse reactions: Flushing, abdominal pain, diarrhea, and nausea. Patient can take non-enteric coated aspirin 30 minutes prior to dose to minimize flushing.\par Informed patient that Vumerity can cause decrease in lymphocyte counts and cause liver injury. \par Discussed about small risk of PML(progressive multifocal leukoencephalopathy) in patients treated with similar drug, Tecfidera. Informed patient of typical symptoms associated with PML; including progressive weakness on one side of the body or clumsiness of limbs, disturbance of vision, and changes in thinking, memory, and orientation leading to confusion and personality changes\par \par \par 3. Symptomatic therapy plan:\par () Fatigue; Will continue to follow\par () Spasticity: Will continue monitor\par () Neuropathic pain: Will continue to monitor\par () Bladder Dysfunction- no present issues\par () Mobility: Left foot drop- referred to physical therapy\par () Vitamin D3 and B12 supplementations \par \par Return to clinic 6 weeks\par \par Available imaging studies and/or blood work up were reviewed. A detailed chart review was completed prior to visit.\par \par The above plan was discussed with ARLIN YO in great detail. ARLIN YO verbalized understanding and agrees with plan as detailed above. Patient was provided education and counselling on current diagnosis/symptoms, diagnostic work up, treatment options and potential side effects of any prescribed therapy/therapies. He was advised to call our clinic at [phone] for any new or worsening symptoms, or with any questions or concerns. In case of acute onset of neurological symptoms or worsening presentation, patient was advised to present to nearest emergency room for further evaluation. ARLIN YO expressed understanding and all his questions/concerns were addressed.\par \par PARESH Martinez

## 2021-06-04 NOTE — DATA REVIEWED
[de-identified] : MRI brain 5/3/2021:-\par “Brain MRI: Unchanged encephalomalacia and gliosis within the genuine of the corpus callosum.\par \par Increased signal intensity along the dorsal medulla without enhancement is nonspecific in appearance. Increasing number of foci of elevated signal intensity within the subcortical and periventricular white matter. Differential diagnosis includes ischemic, infectious/inflammatory and demyelinating processes. Cervical spine MRI with contrast may be valuable for further evaluation.\par \par New small inferior left frontal meningioma.”\par \par \par MRI cervical and thoracic spine 5/5/2021:-\par “Enhancing, T2 hyperintense, and slightly expansile lesion is noted in the cord at left C4-C5.\par \par Additional T2 hyperintense but nonenhancing lesions are identified throughout the spinal cord at the dorsal aspect of the medulla, right hemicord at C1 level, left hemicord at C3, left hemicord at T1, right hemicord at T3-T4, right ventral cord at T11-T12, and right hemicord at T12.\par IMPRESSION:\par \par -Enhancing left cord lesion at C4-C5 most compatible with acute demyelination. Less likely consideration is metastasis in this patient with history of germinoma. Multiple additional nonenhancing lesions throughout the spinal cord, most compatible with chronic demyelination. Correlate with symptomatology and CSF analysis”\par \par \par MRI Lumbar spine 5/7/2021:-\par “IMPRESSION: Evidence of T2 hyperintensity in the right aspect of the cord at T11-12 as well as more inferiorly at T12-L1 level extending to the tip of the conus without associated enhancement or expansion of the cord likely chronic demyelinating pathology.”\par \par

## 2021-06-04 NOTE — HISTORY OF PRESENT ILLNESS
[FreeTextEntry1] : INTERIM HX 06/04/2021: Spoke to Dr. Ortiz (hem/onc) and he confirmed that patient has beta thalassemia with secondary hemochromatosis. He had recommended patient get a capsule study to rule out small bowel bleed to explain significant anemia in the hospital- this is scheduled 6/9/2021. I had discussed diagnosis of MS with him and he stated no contraindications to considering any DMT as needed. \par \par He has started on Physical therapy this week. 1st session yesterday.\par \par Labs reviewed (6/2/2021): Quant TB neg, TSH normal, Hep B surface ab +, Hep B surface antigen core antibody, IgM negative, Lyme neg, NMO neg, ACE neg, DsDNA neg, SSA/SB neg.\par Varicella zoster IgG ab positive and IgM elevated (2.44)\par immunoglobulin panel with elevated IgM 347 (normal )\par MOG and JCV antibody pending\par \par HPI (initial visit May 21, 2021)- Timothy is a 41 yo man with a hx of brain germinoma s/p radiation/chemotherapy 2000, was admitted to [facility] 5/2/2021-5/11/2021 for worsening baseline LLE weakness. MRI brain and spine were obtained. MRI brain showed non enhancing scattered subcortical and periventricular white matter lesions and a dorsal medulla lesion. MRI spine showed scattered non enhancing short segment cord lesions in cervical and thoracic cord and also involving conus, an enhancing left cord lesion was noted at C4-C5. A spinal tap was performed and showed 2 cells, protein 30, 5 unique Oligoclonal bands with elevated IgG index, MOG and NMO in CSF neg, paraneoplastic panel neg and CSF PCR neg. Serum RICHARD, RF neg. B12 363. COVID PCR negative. UDS negative. \par \par He was treated with 5 days of IV solumedrol with improvement in symptoms on discharge from hospital. \par \par Of note, patient also has a long standing hx of hemochromatosis requiring phlebotomies. In the hospital it was noted that he had anemia (Hb 6.1), Lymphopenia (2.44) and thrombocytopenia (109) of unclear etiology and is to follow up with hematology/oncology. He was found to have low reticulocyte count and a bone marrow biopsy is planned as outpatient for possible myelodysplastic syndrome. 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  Also noted to have elevated PTT and mildy low levels of factor 8 and 9. Also found to have low ferritin and + FOBT and endoscopy and colonoscopy did not reveal any source of bleed. \par \par most recent CBC 5/19/2021:-\par wbc 9.96, hb 8.9, platelet 208, absolute lymph 1.30 k/ul\par CMP normal\par Serum iron normal. % saturation low (13)\par Ferritin normal\par \par He returns for follow up appointment and is accompanied by his two sisters. He reports the left foot drop has not significantly improved. He continues to drag his left leg and foot while he walks and scuff the foot against the ground when walking. He reports the weakness in left leg had initially started after his diagnosis of germinoma. He does not recall having a MRI of the spine at the time. He also recalls that about 4 years ago he had trouble with his vision, where whenever he would look to the left he would not be able to focus on objects. He adds that he has not been feeling well for several months and has had low energy. \par

## 2021-06-07 LAB
JCV INDEX: 0.22
STRATIFY JCV ANTIBODY: ABNORMAL

## 2021-06-08 LAB — MOG AB SER QL CBA IFA: NEGATIVE

## 2021-06-09 ENCOUNTER — APPOINTMENT (OUTPATIENT)
Dept: GASTROENTEROLOGY | Facility: CLINIC | Age: 41
End: 2021-06-09
Payer: COMMERCIAL

## 2021-06-09 PROCEDURE — 99072 ADDL SUPL MATRL&STAF TM PHE: CPT

## 2021-06-09 PROCEDURE — 91110 GI TRC IMG INTRAL ESOPH-ILE: CPT

## 2021-06-10 ENCOUNTER — NON-APPOINTMENT (OUTPATIENT)
Age: 41
End: 2021-06-10

## 2021-06-16 RX ORDER — DIROXIMEL FUMARATE 231 MG/1
231 CAPSULE ORAL
Qty: 120 | Refills: 3 | Status: DISCONTINUED | COMMUNITY
Start: 2021-06-10 | End: 2021-06-16

## 2021-06-28 RX ORDER — DIMETHYL FUMARATE 120-240 MG
120 & 240 KIT ORAL
Qty: 60 | Refills: 0 | Status: DISCONTINUED | COMMUNITY
Start: 2021-06-16 | End: 2021-06-28

## 2021-06-28 RX ORDER — DIMETHYL FUMARATE 240 MG/1
240 CAPSULE, DELAYED RELEASE ORAL
Qty: 60 | Refills: 5 | Status: DISCONTINUED | COMMUNITY
Start: 2021-06-16 | End: 2021-06-28

## 2021-06-30 ENCOUNTER — APPOINTMENT (OUTPATIENT)
Dept: CARDIOLOGY | Facility: CLINIC | Age: 41
End: 2021-06-30
Payer: COMMERCIAL

## 2021-06-30 VITALS
OXYGEN SATURATION: 96 % | DIASTOLIC BLOOD PRESSURE: 70 MMHG | SYSTOLIC BLOOD PRESSURE: 112 MMHG | BODY MASS INDEX: 19.47 KG/M2 | WEIGHT: 136 LBS | HEART RATE: 79 BPM | HEIGHT: 70 IN | TEMPERATURE: 98.2 F

## 2021-06-30 VITALS
WEIGHT: 125 LBS | HEIGHT: 70 IN | HEART RATE: 87 BPM | TEMPERATURE: 97.6 F | OXYGEN SATURATION: 97 % | DIASTOLIC BLOOD PRESSURE: 68 MMHG | BODY MASS INDEX: 17.9 KG/M2 | SYSTOLIC BLOOD PRESSURE: 126 MMHG

## 2021-06-30 DIAGNOSIS — E04.1 NONTOXIC SINGLE THYROID NODULE: ICD-10-CM

## 2021-06-30 PROCEDURE — 99072 ADDL SUPL MATRL&STAF TM PHE: CPT

## 2021-06-30 PROCEDURE — 99213 OFFICE O/P EST LOW 20 MIN: CPT

## 2021-06-30 NOTE — HISTORY OF PRESENT ILLNESS
[FreeTextEntry1] : This is a 40 year old gentlemen who presents today for follow up after being seen in our office on May 19, 2021. Patient has been following closely with Dr. Galo (MS specialist), he states that he is attending physical therapy and states that his weakness is getting better. Patient also states that he is following closely with Dr. Chappell and is planning to have an additional iron infusion on Friday this week. Patient still taking iron Supplement. Patient also following with GI- Dr. lBue, s/p Capsule study and patient needs to schedule a balloon enteroscopy. Patient also states that  he sent stool samples with Dr. chappell yesterday to rule out hematochezia. Patient taking vitamiN D supplement daily.  Patient denies dyspnea, palpitations, chest pain, nausea, vomiting, dizziness and lightheadedness.\par

## 2021-06-30 NOTE — PHYSICAL EXAM
[Well Developed] : well developed [Well Nourished] : well nourished [No Acute Distress] : no acute distress [Normal Conjunctiva] : normal conjunctiva [Normal Venous Pressure] : normal venous pressure [No Carotid Bruit] : no carotid bruit [Normal S1, S2] : normal S1, S2 [No Rub] : no rub [No Gallop] : no gallop [Clear Lung Fields] : clear lung fields [Good Air Entry] : good air entry [No Respiratory Distress] : no respiratory distress  [Soft] : abdomen soft [Non Tender] : non-tender [No Masses/organomegaly] : no masses/organomegaly [Normal Bowel Sounds] : normal bowel sounds [Normal Gait] : normal gait [No Edema] : no edema [No Cyanosis] : no cyanosis [No Clubbing] : no clubbing [No Varicosities] : no varicosities [No Rash] : no rash [No Skin Lesions] : no skin lesions [Moves all extremities] : moves all extremities [No Focal Deficits] : no focal deficits [Normal Speech] : normal speech [Alert and Oriented] : alert and oriented [Normal memory] : normal memory [Murmur] : murmur [de-identified] : 1/6 systolic murmur apex

## 2021-07-12 ENCOUNTER — NON-APPOINTMENT (OUTPATIENT)
Age: 41
End: 2021-07-12

## 2021-07-15 ENCOUNTER — RX RENEWAL (OUTPATIENT)
Age: 41
End: 2021-07-15

## 2021-07-16 ENCOUNTER — APPOINTMENT (OUTPATIENT)
Dept: NEUROLOGY | Facility: CLINIC | Age: 41
End: 2021-07-16
Payer: COMMERCIAL

## 2021-07-16 ENCOUNTER — NON-APPOINTMENT (OUTPATIENT)
Age: 41
End: 2021-07-16

## 2021-07-16 VITALS
BODY MASS INDEX: 19.33 KG/M2 | HEART RATE: 98 BPM | HEIGHT: 70 IN | DIASTOLIC BLOOD PRESSURE: 72 MMHG | SYSTOLIC BLOOD PRESSURE: 105 MMHG | WEIGHT: 135 LBS

## 2021-07-16 LAB
DEPRECATED KAPPA LC FREE/LAMBDA SER: 0.94 RATIO
IGA SER QL IEP: 138 MG/DL
IGG SER QL IEP: 934 MG/DL
IGM SER QL IEP: 339 MG/DL
KAPPA LC CSF-MCNC: 2.28 MG/DL
KAPPA LC SERPL-MCNC: 2.15 MG/DL
VZV IGM SER IF-ACNC: 2.49 INDEX

## 2021-07-16 PROCEDURE — 99214 OFFICE O/P EST MOD 30 MIN: CPT

## 2021-07-16 PROCEDURE — 99072 ADDL SUPL MATRL&STAF TM PHE: CPT

## 2021-07-16 NOTE — ASSESSMENT
[FreeTextEntry1] : Assessment/Plan:\par  40 year male with a hx of brain germinoma s/p radiation/chemotherapy 2000, hx of beta thalassemia with secondary Hemochromatosis requiring phlebotomies, was admitted to [facility] 5/2/2021-5/11/2021 for worsening baseline LLE weakness with foot drop and found to have lesions on brain and spine MRI consistent with demyelinating lesions with active cord lesion at C4-C5. A spinal tap confirmed the presence of 5 unique oligoclonal bands and elevated IgG index, and normal cell count, protein and CSF NMO and MOG normal. Serum NMO and MOG neg. He was treated with 5 days of IV steroids with modest improvement in symptoms. \par \par Neurological presentation consistent with relapsing Multiple sclerosis. \par \par I discussed the pathophysiology of Multiple Sclerosis, disease course and management. We discussed the different options for disease modifying therapy. I explained to her the goal of treatment is to prevent any new clinical relapses, new lesions on MRI scans and to slow down disease progression/disability. We also discussed symptom management for hx of spasticity, neuropathic pain, bladder symptoms, fatigue etc.\par \par \par Plan: \par 1. Diagnostic Plan/Imaging: \par [] Will plan to repeat MRI brain and cervical spine w/w/o in Jan 2022\par [] Advised to provide me with prior MRI brain and spine scans from the time of the diagnosis of Germinoma. \par \par 2. Disease Modifying therapy plan:.\par Tecfidera:\par Continue Tecfidera:\par CBC and LFT every 6 months\par JCV antibody test every year\par Continue 240mg BID. May take Tecifdera with or without food\par Discussed common adverse reactions: Flushing, abdominal pain, diarrhea, and nausea. Patient can take non-enteric coated aspirin\par 30 minutes prior to dose to minimize flushing	\par Discussed about small risk of PML in patients treated with Tecfidera. Will monitor for lymphopenia. \par \par 3. Symptomatic therapy plan:\par () Fatigue; Will continue to follow\par () Spasticity: Will continue monitor\par () Neuropathic pain: Will continue to monitor\par () Bladder Dysfunction- no present issues\par () Mobility: Left foot drop- continue physical therapy \par () Vitamin D3 and B12 supplementations \par \par Return to clinic Jan 2022\par \par He can return to work with light duty starting on 7/26/2021.\par \par Available imaging studies and/or blood work up were reviewed. A detailed chart review was completed prior to visit.\par \par The above plan was discussed with ARLINMARY YO in great detail. ARLIN LOMELIZONIAS verbalized understanding and agrees with plan as detailed above. Patient was provided education and counselling on current diagnosis/symptoms, diagnostic work up, treatment options and potential side effects of any prescribed therapy/therapies. He was advised to call our clinic at [phone] for any new or worsening symptoms, or with any questions or concerns. In case of acute onset of neurological symptoms or worsening presentation, patient was advised to present to nearest emergency room for further evaluation. ARLIN DORAS expressed understanding and all his questions/concerns were addressed.\par \par Wendy Galo MD

## 2021-07-16 NOTE — DATA REVIEWED
[de-identified] : MRI brain 5/3/2021:-\par “Brain MRI: Unchanged encephalomalacia and gliosis within the genuine of the corpus callosum.\par \par Increased signal intensity along the dorsal medulla without enhancement is nonspecific in appearance. Increasing number of foci of elevated signal intensity within the subcortical and periventricular white matter. Differential diagnosis includes ischemic, infectious/inflammatory and demyelinating processes. Cervical spine MRI with contrast may be valuable for further evaluation.\par \par New small inferior left frontal meningioma.”\par \par \par MRI cervical and thoracic spine 5/5/2021:-\par “Enhancing, T2 hyperintense, and slightly expansile lesion is noted in the cord at left C4-C5.\par \par Additional T2 hyperintense but nonenhancing lesions are identified throughout the spinal cord at the dorsal aspect of the medulla, right hemicord at C1 level, left hemicord at C3, left hemicord at T1, right hemicord at T3-T4, right ventral cord at T11-T12, and right hemicord at T12.\par IMPRESSION:\par \par -Enhancing left cord lesion at C4-C5 most compatible with acute demyelination. Less likely consideration is metastasis in this patient with history of germinoma. Multiple additional nonenhancing lesions throughout the spinal cord, most compatible with chronic demyelination. Correlate with symptomatology and CSF analysis”\par \par \par MRI Lumbar spine 5/7/2021:-\par “IMPRESSION: Evidence of T2 hyperintensity in the right aspect of the cord at T11-12 as well as more inferiorly at T12-L1 level extending to the tip of the conus without associated enhancement or expansion of the cord likely chronic demyelinating pathology.”\par \par

## 2021-07-16 NOTE — HISTORY OF PRESENT ILLNESS
[FreeTextEntry1] : INTERIM HX 07/16/2021: MOG neg. JCV antibody indeterminate (0.22). Repeat VZV IgM antibody positive (discussed with primary, Dr. Ponce, and this is likely false positive old vzv). He started Tecfidera on 6/30/2021 and denies any side effects, no flushing or GI side effects. He has been doing PT and reports he feels better and PT have noticed good progress. \par \par INTERIM HX 06/04/2021: Spoke to Dr. Ortiz (hem/onc) and he confirmed that patient has beta thalassemia with secondary hemochromatosis. He had recommended patient get a capsule study to rule out small bowel bleed to explain significant anemia in the hospital- this is scheduled 6/9/2021. I had discussed diagnosis of MS with him and he stated no contraindications to considering any DMT as needed. \par \par Labs reviewed (6/2/2021): Quant TB neg, TSH normal, Hep B surface ab +, Hep B surface antigen core antibody, IgM negative, Lyme neg, NMO neg, ACE neg, DsDNA neg, SSA/SB neg.\par Varicella zoster IgG ab positive and IgM elevated (2.44)\par immunoglobulin panel with elevated IgM 347 (normal )\par MOG and JCV antibody pending\par \par HPI (initial visit May 21, 2021)- Timothy is a 39 yo man with a hx of brain germinoma s/p radiation/chemotherapy 2000, was [facility] to [facility] 5/2/2021-5/11/2021 for worsening baseline LLE weakness. MRI brain and spine were obtained. MRI brain showed non enhancing scattered subcortical and periventricular white matter lesions and a dorsal medulla lesion. MRI spine showed scattered non enhancing short segment cord lesions in cervical and thoracic cord and also involving conus, an enhancing left cord lesion was noted at C4-C5. A spinal tap was performed and showed 2 cells, protein 30, 5 unique Oligoclonal bands with elevated IgG index, MOG and NMO in CSF neg, paraneoplastic panel neg and CSF PCR neg. Serum RICHARD, RF neg. B12 363. COVID PCR negative. UDS negative. \par \par He was treated with 5 days of IV solumedrol with improvement in symptoms on discharge from hospital. \par \par Of note, patient also has a long standing hx of hemochromatosis requiring phlebotomies. In the hospital it was noted that he had anemia (Hb 6.1), Lymphopenia (2.44) and thrombocytopenia (109) of unclear etiology and is to follow up with hematology/oncology. He was found to have low reticulocyte count and a bone marrow biopsy is planned as outpatient for possible myelodysplastic syndrome. 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  Also noted to have elevated PTT and mildy low levels of factor 8 and 9. Also found to have low ferritin and + FOBT and endoscopy and colonoscopy did not reveal any source of bleed. \par \par most recent CBC 5/19/2021:-\par wbc 9.96, hb 8.9, platelet 208, absolute lymph 1.30 k/ul\par CMP normal\par Serum iron normal. % saturation low (13)\par Ferritin normal\par \par He returns for follow up appointment and is accompanied by his two sisters. He reports the left foot drop has not significantly improved. He continues to drag his left leg and foot while he walks and scuff the foot against the ground when walking. He reports the weakness in left leg had initially started after his diagnosis of germinoma. He does not recall having a MRI of the spine at the time. He also recalls that about 4 years ago he had trouble with his vision, where whenever he would look to the left he would not be able to focus on objects. He adds that he has not been feeling well for several months and has had low energy. \par

## 2021-07-20 NOTE — PROGRESS NOTE ADULT - PROBLEM SELECTOR PROBLEM 7
Patient Education   ================================  Dear Veronica,   Your health and wellness is important to me!    Re: LAB/DIAGNOSTIC RESULTS  As we discussed at your visit, I am happy to provide you detailed interpretations of your lab/diagnostic results and recommendations which will be sent to you via Portal message.  Please allow at least 5-7 business days for this analysis and recommendations to be put together, as we provide direct patient care to other patients in the interim.      The electronic medical record 'auto-releases' results, so you may see results ONLY available to you before your recommendations are posted.  We request your kind patience to await your information to be sent to you!     Warm regards,  = Dr Pleitez   ================================       Patient Education   DR. PLEITEZ'S WELLNESS GOALS FOR HEALTHY ADULTS    Congratulations on taking the initiative to optimize your health and wellness! Below are recommendations on lifestyle goals to prevent chronic medical problems such as diabetes, high blood pressure, and heart disease. I look forward to working with you to achieve these objectives.    NUTRITION:    Remember, “FRESH IS BEST”    * Aim for 6-8 servings of fresh fruits and vegetables daily.    * Always choose fresh produce over preserved (canned or frozen) as our bodies get much more vitamins and nutrients without the extra salt and sugar.    * Avoiding artificial ingredients in general is a great way to support our bodies and immune system. Our bodies cannot process synthetic ingredients, and we do not obtain any nutritional value from these. Read the ingredients list--if there are lots of words that you cannot pronounce in that list, rethink consuming that product!    * Complex carbohydrates: our bodies need some amount of carbohydrates, so consume these in moderation. Always opt for the whole grain products (brown rice, whole grain bread, whole grain pasta) over white flour products as  these provide a more constant energy source without extra stress on our body.    * Hydration: the average adult needs a daily intake of 2 liters plain water daily to support a healthy immune system. Remember, we lose considerable amounts of water through the skin, so if you are working out or have fevers aim for 2-3 liters baseline.      EXERCISE:    * Aim for at least 45 minutes of cardiovascular activity 3-4 days out of the week for heart health. This means any activity where you are breaking a sweat!    * For weight loss, aim for at least 60 minutes of cardiovascular activity 4-5 days out of the week    * Workout with a partner to keep you motivated    * To maintain joint and bone health, include some resistance exercises such as yoga, carole chi, weight training    * Remember, avoid low back injuries with core strength!    RESOURCES:  * For more helpful handouts and information from Dr Shaffer check out [email or web address]    = Janeth Shaffer DO    Family Physician        Thrombocytopenia

## 2021-07-21 ENCOUNTER — RX RENEWAL (OUTPATIENT)
Age: 41
End: 2021-07-21

## 2021-08-02 ENCOUNTER — APPOINTMENT (OUTPATIENT)
Dept: GASTROENTEROLOGY | Facility: CLINIC | Age: 41
End: 2021-08-02

## 2021-10-25 ENCOUNTER — NON-APPOINTMENT (OUTPATIENT)
Age: 41
End: 2021-10-25

## 2021-10-27 ENCOUNTER — NON-APPOINTMENT (OUTPATIENT)
Age: 41
End: 2021-10-27

## 2021-11-02 ENCOUNTER — TRANSCRIPTION ENCOUNTER (OUTPATIENT)
Age: 41
End: 2021-11-02

## 2021-11-03 ENCOUNTER — APPOINTMENT (OUTPATIENT)
Dept: MRI IMAGING | Facility: CLINIC | Age: 41
End: 2021-11-03
Payer: COMMERCIAL

## 2021-11-03 PROCEDURE — 72156 MRI NECK SPINE W/O & W/DYE: CPT

## 2021-11-03 PROCEDURE — 70553 MRI BRAIN STEM W/O & W/DYE: CPT

## 2021-11-03 PROCEDURE — A9585: CPT

## 2021-11-03 PROCEDURE — 72157 MRI CHEST SPINE W/O & W/DYE: CPT

## 2021-11-05 ENCOUNTER — APPOINTMENT (OUTPATIENT)
Dept: GASTROENTEROLOGY | Facility: CLINIC | Age: 41
End: 2021-11-05
Payer: COMMERCIAL

## 2021-11-05 VITALS
DIASTOLIC BLOOD PRESSURE: 66 MMHG | BODY MASS INDEX: 20.04 KG/M2 | HEIGHT: 70 IN | HEART RATE: 76 BPM | WEIGHT: 140 LBS | SYSTOLIC BLOOD PRESSURE: 106 MMHG

## 2021-11-05 PROCEDURE — 99204 OFFICE O/P NEW MOD 45 MIN: CPT

## 2021-11-05 RX ORDER — DIMETHYL FUMARATE 240 MG/1
240 CAPSULE ORAL
Qty: 60 | Refills: 11 | Status: DISCONTINUED | COMMUNITY
Start: 2021-06-25 | End: 2021-11-05

## 2021-11-05 RX ORDER — DIMETHYL FUMARATE 120-240 MG
120 & 240 KIT ORAL
Qty: 60 | Refills: 0 | Status: DISCONTINUED | COMMUNITY
Start: 2021-06-25 | End: 2021-11-05

## 2021-11-05 RX ORDER — FERROUS SULFATE 325(65) MG
325 TABLET ORAL
Refills: 0 | Status: DISCONTINUED | COMMUNITY
End: 2021-11-05

## 2021-11-05 NOTE — ASSESSMENT
[FreeTextEntry1] : ARLIN YO 41 year M with hemochromatosis, multiple sclerosis, splenomegaly,  Vitamin D deficiency and LLE weakness here for iron deficiency anemia for one year with no etiology identified on endoscopy and colonoscopy in 05/2021 with capsule showing cystic jejunal lesions. \par \par I will schedule a push enteroscopy to exclude small bowel bleeding given his recurrent anemia despite iron infusions. I also advised him to stop oral iron two weeks prior to the procedure.\par \par Follow up as needed.

## 2021-11-05 NOTE — CONSULT LETTER
[Dear  ___] : Dear  [unfilled], [Consult Letter:] : I had the pleasure of evaluating your patient, [unfilled]. [Please see my note below.] : Please see my note below. [Consult Closing:] : Thank you very much for allowing me to participate in the care of this patient.  If you have any questions, please do not hesitate to contact me. [DrWanda  ___] : Dr. PEREZ

## 2021-11-05 NOTE — HISTORY OF PRESENT ILLNESS
[Heartburn] : denies heartburn [Nausea] : denies nausea [Vomiting] : denies vomiting [Diarrhea] : denies diarrhea [Constipation] : denies constipation [Yellow Skin Or Eyes (Jaundice)] : denies jaundice [Abdominal Pain] : denies abdominal pain [Abdominal Swelling] : denies abdominal swelling [Rectal Pain] : denies rectal pain [Kidney Stone] : kidney stone [Wt Gain ___ Lbs] : no recent weight gain [Wt Loss ___ Lbs] : no recent weight loss [Hiatus Hernia] : no hiatus hernia [GERD] : no gastroesophageal reflux disease [Peptic Ulcer Disease] : no peptic ulcer disease [Pancreatitis] : no pancreatitis [Cholelithiasis] : no cholelithiasis [Inflammatory Bowel Disease] : no inflammatory bowel disease [Irritable Bowel Syndrome] : no irritable bowel syndrome [Diverticulitis] : no diverticulitis [Alcohol Abuse] : no alcohol abuse [Malignancy] : no malignancy [Abdominal Surgery] : no abdominal surgery [Appendectomy] : no appendectomy [Cholecystectomy] : no cholecystectomy [de-identified] : ARLIN YO 41 year M with hemochromatosis, multiple sclerosis, splenomegaly,  Vitamin D deficiency and LLE weakness here for iron deficiency anemia for one year.\par \par Present HB 8.1 dropped from 12  referred by Dr Ortiz Hematologist.  \par Previous work up: \par April 2021: HB 6.1 and patient underwent the following procedures.\par 05/06/2021 Dr Gunn endoscopy Endoscopy fundic gland polyps\par 05/06/2021 Dr Gunn colonoscopy right colonic diverticulosis\par 05/03/2021 CT abdomen mild splenomegaly\par 06/09/2021 capsule endoscopy Dr Blue cysts in jejunum, an enteroscopy was suggested but not performed.\par \par Patient states of a good appetite, no loss of weight, bowel movement once daily, formed and brown stools without blood or mucus. Denies abdominal pain, nausea, vomiting, melena, hematochezia, or hematemesis.\par

## 2021-11-05 NOTE — ADDENDUM
[FreeTextEntry1] : Sheldon Henley MD\par \par Assistant Clinical Professor \par Division of Gastroenterology at [facility]\par [facility]\par [facility] at [facility]\par [facility]\par \par [address], [place] [address]\par Tel: [phone] | Fax: [phone]\par \par [address], [place], [address] \par Telephone: [phone] Fax: [phone]\par \par Twitter (Personal): @[email or web address] \par \par \par

## 2021-11-08 ENCOUNTER — APPOINTMENT (OUTPATIENT)
Dept: NEUROLOGY | Facility: CLINIC | Age: 41
End: 2021-11-08
Payer: COMMERCIAL

## 2021-11-08 VITALS
BODY MASS INDEX: 18.61 KG/M2 | HEIGHT: 70 IN | WEIGHT: 130 LBS | SYSTOLIC BLOOD PRESSURE: 114 MMHG | HEART RATE: 93 BPM | DIASTOLIC BLOOD PRESSURE: 68 MMHG

## 2021-11-08 PROCEDURE — 99417 PROLNG OP E/M EACH 15 MIN: CPT

## 2021-11-08 PROCEDURE — 99215 OFFICE O/P EST HI 40 MIN: CPT

## 2021-11-09 NOTE — HISTORY OF PRESENT ILLNESS
[FreeTextEntry1] : Reason for consult: MS\par \par HPI: ARLIN YO is a 41 year old man \par \par Sees Dr. Galo.\par 40 yo man with a hx of brain germinoma s/p radiation/chemotherapy 2000, was admitted to [facility] 5/2/2021-5/11/2021 for worsening baseline LLE weakness. MRI brain and spine were obtained. MRI brain showed non enhancing scattered subcortical and periventricular white matter lesions and a dorsal medulla lesion. MRI spine showed scattered non enhancing short segment cord lesions in cervical and thoracic cord and also involving conus, an enhancing left cord lesion was noted at C4-C5. A spinal tap was performed and showed 2 cells, protein 30, 5 unique Oligoclonal bands with elevated IgG index, MOG and NMO in CSF neg, paraneoplastic panel neg and CSF PCR neg. He was treated with 5 days of IV solumedrol with improvement in symptoms on discharge from hospital. \par \par Started DMF in early 7/2021. Not missing any doses.\par \par LLE hand and leg are getting weaker gradually over 6wks. Still better than when went to hospital.\par \par No BB dysfunction.\par \par PMHX:\par hemochromatosis\par MS\par germinoma\par \par MEDS:\par iron infusions\par tecfidera\par \par ALL: nkda\par \par Vitals: unremarkable\par \par Exam:\par AO3.  Normally conversant.  Follows commands, names, and repeats.  Good attention.\par PERRL, VFF, mild R LISA, nystagmus horizontal to left and upbeating on upgaze, face symmetric, TUP at midline.\par Motor: \par                                                 R:                               L:\par Del                                           5                                5\par Bi                                              5                               5\par Tri                                            5                               5\par Wrist Extensors                      5                               5\par Finger abductors                    5                               5\par                                         5                               5- \par \par HF                                           5                               5\par KE                                           5                               5\par KF                                           5                               5\par DF                                           5                               5\par PF                                           5                               5\par \par Sensory                                RUE                      LUE                 RLE                LLE     \par LT                                           +                            +                      +                   +\par Vib                                          +                            +                     dec               dec\par \par \par Reflexes:\par L sided reflex preponderance\par \par Coordination:\par                                              R                             L                       \par FTN                                       0                             0 \par TODD                                      0                            0\par \par Gait: drags LLE\par \par MRI brain 11/2021 - unchanged\par \par MRI C spine 11/2021 - new C4 eri+ lesion, possibly some other new t2h but extensive burden and differences in technique make direct comparison extremely difficult.\par \par MRI T spine 11/2021 -  new T6 eri+ lesion\par \par AP: 42yo w/ hx of brain germinoma, recent MS diagnosis. Report of worsening L sided weakness - I only note subtle L sided weakness and I'm not sure what his baseline was. However, given report of worsening weakness and eri+ lesions on MRI, with extensive lesion burden, will give HDOP and likely escalate to higher efficacy DMT.\par \par all questions answered, education provided, management discussed at length. imaging reviewed personally with pt. \par \par - trial of HDOP, oral taper, with famotidine.\par - will discuss DMT w/ heme and Dr. Galo, likely ching assuming repeat JCV negative, vs ocrevus.\par - cont vitD3 50ku weekly\par \par

## 2021-11-10 ENCOUNTER — TRANSCRIPTION ENCOUNTER (OUTPATIENT)
Age: 41
End: 2021-11-10

## 2021-11-10 ENCOUNTER — NON-APPOINTMENT (OUTPATIENT)
Age: 41
End: 2021-11-10

## 2021-11-11 ENCOUNTER — NON-APPOINTMENT (OUTPATIENT)
Age: 41
End: 2021-11-11

## 2021-11-15 NOTE — PROGRESS NOTE ADULT - NSICDXPILOT_GEN_ALL_CORE
How Severe Is Your Skin Condition?: mild
[place]
What Brings You In Today For Follow Up? (This Is An Xx Year Old Patient Who Is Following Up For:): Follow up on biopsy proven DN
[place]
Where On Your Body Is It? (Located On:): Left distal lower leg
[place]
What Was It Treated With? (The Condition Was Treated With:): Biopsy
[place]
Since The Treatment Is Your Condition Better, Worse, Or Unchanged? (Since The Treatment, The Condition Is:): Pt had biopsy proven DN margins clear but close pt here to re check site
[place]

## 2021-11-17 ENCOUNTER — NON-APPOINTMENT (OUTPATIENT)
Age: 41
End: 2021-11-17

## 2021-11-23 ENCOUNTER — LABORATORY RESULT (OUTPATIENT)
Age: 41
End: 2021-11-23

## 2021-11-23 ENCOUNTER — APPOINTMENT (OUTPATIENT)
Dept: CARDIOLOGY | Facility: CLINIC | Age: 41
End: 2021-11-23
Payer: COMMERCIAL

## 2021-11-23 VITALS
HEIGHT: 70 IN | DIASTOLIC BLOOD PRESSURE: 74 MMHG | OXYGEN SATURATION: 97 % | WEIGHT: 134 LBS | HEART RATE: 88 BPM | BODY MASS INDEX: 19.18 KG/M2 | SYSTOLIC BLOOD PRESSURE: 136 MMHG | TEMPERATURE: 97.7 F

## 2021-11-23 PROCEDURE — 99214 OFFICE O/P EST MOD 30 MIN: CPT

## 2021-11-23 NOTE — HISTORY OF PRESENT ILLNESS
[FreeTextEntry1] : This is a 41 year old gentlemen with a PMH of Multiple Sclerosis, Anemia, and Thyroid Nodule who presents today for follow up after being seen in our office on June 30, 2021. Patient states that he is overall feeling OK. Patient follows closely with Neuro, and has been following with GI. Patient is scheduled for Enteroscopy next month. Patient denies dyspnea, palpitations, chest pain, nausea, vomiting, dizziness and lightheadedness.\par pt on steroid bolus as per neuro for ms flare

## 2021-11-26 ENCOUNTER — TRANSCRIPTION ENCOUNTER (OUTPATIENT)
Age: 41
End: 2021-11-26

## 2021-11-26 ENCOUNTER — NON-APPOINTMENT (OUTPATIENT)
Age: 41
End: 2021-11-26

## 2021-12-01 ENCOUNTER — APPOINTMENT (OUTPATIENT)
Dept: GASTROENTEROLOGY | Facility: HOSPITAL | Age: 41
End: 2021-12-01

## 2021-12-05 ENCOUNTER — TRANSCRIPTION ENCOUNTER (OUTPATIENT)
Age: 41
End: 2021-12-05

## 2021-12-07 ENCOUNTER — NON-APPOINTMENT (OUTPATIENT)
Age: 41
End: 2021-12-07

## 2021-12-07 NOTE — ASU PATIENT PROFILE, ADULT - FALL HARM RISK - UNIVERSAL INTERVENTIONS
Bed in lowest position, wheels locked, appropriate side rails in place/Call bell, personal items and telephone in reach/Instruct patient to call for assistance before getting out of bed or chair/Non-slip footwear when patient is out of bed/[place] to call system/Physically safe environment - no spills, clutter or unnecessary equipment/Purposeful Proactive Rounding/Room/bathroom lighting operational, light cord in reach

## 2021-12-08 ENCOUNTER — APPOINTMENT (OUTPATIENT)
Dept: GASTROENTEROLOGY | Facility: HOSPITAL | Age: 41
End: 2021-12-08

## 2021-12-08 ENCOUNTER — OUTPATIENT (OUTPATIENT)
Dept: OUTPATIENT SERVICES | Facility: HOSPITAL | Age: 41
LOS: 1 days | Discharge: HOME | End: 2021-12-08
Payer: COMMERCIAL

## 2021-12-08 VITALS
OXYGEN SATURATION: 98 % | DIASTOLIC BLOOD PRESSURE: 60 MMHG | RESPIRATION RATE: 16 BRPM | SYSTOLIC BLOOD PRESSURE: 107 MMHG | HEART RATE: 77 BPM

## 2021-12-08 VITALS
HEART RATE: 82 BPM | RESPIRATION RATE: 11 BRPM | SYSTOLIC BLOOD PRESSURE: 115 MMHG | DIASTOLIC BLOOD PRESSURE: 69 MMHG | TEMPERATURE: 98 F | OXYGEN SATURATION: 98 % | WEIGHT: 139.99 LBS

## 2021-12-08 DIAGNOSIS — D64.9 ANEMIA, UNSPECIFIED: ICD-10-CM

## 2021-12-08 PROCEDURE — 44366 SMALL BOWEL ENDOSCOPY: CPT

## 2021-12-08 NOTE — ASU PREOP CHECKLIST - NS PREOP CHK INSULIN PUMP THERAPY
Airway  Urgency: Elective      General Information and Staff    Patient location during procedure: OR  Anesthesiologist: Na George MD  Resident/CRNA: Shruti Vázquez CRNA  Performed: CRNA     Indications and Patient Condition  Indications for airw
n/a

## 2021-12-08 NOTE — ASU PREOP CHECKLIST - ALLERGY BAND ON
[facility]    Infectious Disease Consultation     Date of Admission:  12/3/2019  Date of Consult (When I saw the patient): 12/05/19    Assessment & Plan   Ry Horner is a 51 year old male who was admitted on 12/3/2019.     Impression:    1. 51 y.o male with diabetes with peripheral polyneuropathy.  2. History of infection in the right foot leading to amputation at the knee.   3. Recently Admitted with severely infected left foot. Gas gangrene of left foot. Osteomyelitis of left great toe. S/PLeft hallux amputation with irrigation and debridement.  4. CKD.   5. Was discharged home on oral Augmentin, presenting now with his foot became macerated.  The wound VAC was discontinued on Friday.  He was seen by Podiatry Clinic with exposed bone.   6. Admitted for IV antibiotics and surgical debridement.          recommendations:   Continue on IV zosyn covers all of the previously isolated organisms   Will follow up on the culture, path report                Franklin Maza MD    Reason for Consult   Reason for consult: I was asked by Dr. Pena  to evaluate this patient for foot osteo.    Primary Care Physician   Kody Wing    Chief Complaint   Worsening wound on the     History is obtained from the patient and medical records    History of Present Illness   Ry Horner is a 51 year old male who presents with worsening left foot wound after recent surgery for severe infection the foot     Past Medical History   I have reviewed this patient's medical history and updated it with pertinent information if needed.   Past Medical History:   Diagnosis Date     BENIGN HYPERTENSION 4/4/2007     DIABETES MELLITUS TYPE II-UNCOMPL 4/4/2007     HYPERLIPIDEMIA NEC/NOS 4/4/2007     Tobacco use disorder 4/4/2007       Past Surgical History   I have reviewed this patient's surgical history and updated it with pertinent information if needed.  Past Surgical History:   Procedure Laterality Date     AMPUTATE FOOT Left  11/17/2019    Procedure: LEFT PARTIAL FOOT AMPUTATION;  Surgeon: Antoine Pena DPM;  Location: [facility] OR     AMPUTATE FOOT Left 12/4/2019    Procedure: LEFT PARTIAL FOOT AMPUTATION;  Surgeon: Tim Douglas DPM;  Location: [facility] OR     AMPUTATE LEG BELOW KNEE Right 9/1/2017    Procedure: AMPUTATE LEG BELOW KNEE;  RIGHT BELOW KNEE AMPUTATION ;  Surgeon: Nikolas Nascimento MD;  Location: [facility] OR     APPENDECTOMY       APPLY WOUND VAC Right 3/2/2015    Procedure: APPLY WOUND VAC;  Surgeon: Milena Cevallos DPM, Pod;  Location: [facility] OR     BIOPSY BONE FOOT Right 7/15/2016    Procedure: BIOPSY BONE FOOT;  Surgeon: Tim Douglas DPM;  Location: [facility] OR     BIOPSY BONE TOE Left 12/4/2019    Procedure: BONE BIOPSY LEFT SECOND TOE;  Surgeon: Tim Douglas DPM;  Location: [facility] OR     IRRIGATION AND DEBRIDEMENT FOOT, COMBINED Right 3/2/2015    Procedure: COMBINED IRRIGATION AND DEBRIDEMENT FOOT;  Surgeon: Milena Cevallos DPM, Pod;  Location: [facility] OR     IRRIGATION AND DEBRIDEMENT FOOT, COMBINED Right 7/15/2016    Procedure: COMBINED IRRIGATION AND DEBRIDEMENT FOOT;  Surgeon: Tim Douglas DPM;  Location: [facility] OR     IRRIGATION AND DEBRIDEMENT FOOT, COMBINED Right 7/20/2016    Procedure: COMBINED IRRIGATION AND DEBRIDEMENT FOOT;  Surgeon: Tim Douglas DPM;  Location: [facility] OR     IRRIGATION AND DEBRIDEMENT FOOT, COMBINED Left 11/19/2019    Procedure: REVISIONAL IRRIGATION AND DEBRIDEMENT LEFT FOOT AND BONE DEBRIDEMENT;  Surgeon: Joseph Randhawa DPM;  Location: [facility] OR     IRRIGATION AND DEBRIDEMENT FOOT, COMBINED Left 12/4/2019    Procedure: EXCISIONAL DEBRIDEMENT LEFT FOOT;  Surgeon: Tim Douglas DPM;  Location: [facility] OR     ORTHOPEDIC SURGERY         Prior to Admission Medications   Prior to Admission Medications   Prescriptions Last Dose Informant Patient Reported? Taking?   ACCU-CHEK JAYNA PLUS test strip  Self No No   Sig: TEST BLOOD SUGARS 2 TO 3 TIMES DAILY OR AS DIRECTED   STATIN NOT  "PRESCRIBED (INTENTIONAL)  Self No No   Sig: Please choose reason not prescribed, below   TRADJENTA 5 MG TABS tablet 12/3/2019 at Unknown time Self No Yes   Sig: TAKE ONE TABLET BY MOUTH EVERY DAY   aspirin 81 MG chewable tablet 12/3/2019 at Unknown time Self Yes Yes   Sig: Take 1 tablet (81 mg) by mouth daily   blood glucose (NO BRAND SPECIFIED) lancets standard  Self No No   Sig: Use to test blood sugar 2-3 times daily or as directed.   blood glucose monitoring (NO BRAND SPECIFIED) meter device kit  Self No No   Sig: Use to test blood sugar 2-3 times daily or as directed.   ezetimibe (ZETIA) 10 MG tablet 12/3/2019 at Unknown time Self No Yes   Sig: Take 1 tablet (10 mg) by mouth daily   hydrochlorothiazide (HYDRODIURIL) 50 MG tablet 12/3/2019 at Unknown time  No Yes   Sig: Take 1 tablet (50 mg) by mouth daily   insulin glargine (LANTUS PEN) 100 UNIT/ML pen 12/2/2019 at Unknown time Self Yes Yes   Sig: Inject 58 Units Subcutaneous At Bedtime   insulin pen needle (BD PEN NEEDLE MEGAN 2ND GEN) 32G X 4 MM miscellaneous   No No   Sig: Use as directed with Basaglar (1 box = 100 days)   ipratropium (ATROVENT) 0.06 % spray prn Self No Yes   Sig: Spray 2 sprays into both nostrils 4 times daily as needed for rhinitis   lisinopril (PRINIVIL/ZESTRIL) 40 MG tablet 12/3/2019 at Unknown time Self No Yes   Sig: Take 1 tablet (40 mg) by mouth daily   metFORMIN (GLUCOPHAGE) 1000 MG tablet 12/3/2019 at am Self No Yes   Sig: Take 1 tablet (1,000 mg) by mouth 2 times daily (with meals)   multivitamin (CENTRUM SILVER) tablet 12/3/2019 at Unknown time  Yes Yes   Sig: Take 1 tablet by mouth daily   order for DME   No No   Sig: Equipment being ordered: Wheelchair Treatment Diagnosis: Diabetic Foot wound with L hallux amputation 2/2 gangrene      Facility-Administered Medications: None     Allergies   Allergies   Allergen Reactions     Pravastatin      \"sucked the life blood out of me\"       Immunization History   Immunization History "   Administered Date(s) Administered     Influenza (H1N1) 01/19/2010     Influenza (IIV3) PF 11/01/2010, 09/22/2011     Pneumococcal 23 valent 10/31/2011, 07/16/2016     TDAP Vaccine (Adacel) 10/28/2014       Social History   I have reviewed this patient's social history and updated it with pertinent information if needed. Ry Horner  reports that he has quit smoking. His smoking use included cigarettes. He has a 10.00 pack-year smoking history. He has never used smokeless tobacco. He reports current alcohol use. He reports that he does not use drugs.    Family History   I have reviewed this patient's family history and updated it with pertinent information if needed.   Family History   Problem Relation Age of Onset     Genetic Disorder Other      Genetic Disorder Other      Psychotic Disorder Mother      Diabetes Father      Lung Cancer Father      Genetic Disorder Maternal Grandmother      Genetic Disorder Maternal Grandfather      Asthma Sister      C.A.D. No family hx of      Hypertension No family hx of      Cerebrovascular Disease No family hx of      Breast Cancer No family hx of      Cancer - colorectal No family hx of      Prostate Cancer No family hx of      Alcohol/Drug No family hx of        Review of Systems   The 10 point Review of Systems is negative other than noted in the HPI or here.     Physical Exam   Temp: 98.7  F (37.1  C) Temp src: Oral BP: (!) 131/93 Pulse: 92 Heart Rate: 97 Resp: 16 SpO2: 96 % O2 Device: None (Room air)    Vital Signs with Ranges  Temp:  [97.2  F (36.2  C)-99  F (37.2  C)] 98.7  F (37.1  C)  Pulse:  [85-98] 92  Heart Rate:  [] 97  Resp:  [12-23] 16  BP: ()/(58-94) 131/93  SpO2:  [93 %-98 %] 96 %  0 lbs 0 oz  There is no height or weight on file to calculate BMI.    GENERAL APPEARANCE:  alert and no distress  EYES: Eyes grossly normal to inspection, PERRL and conjunctivae and sclerae normal  HENT: ear canals and TM's normal and nose and mouth without ulcers or  lesions  NECK: no adenopathy, no asymmetry, masses, or scars and thyroid normal to palpation  RESP: lungs clear to auscultation - no rales, rhonchi or wheezes  CV: regular rates and rhythm, normal S1 S2, no S3 or S4 and no murmur, click or rub  LYMPHATICS: normal ant/post cervical and supraclavicular nodes  ABDOMEN: soft, nontender, without hepatosplenomegaly or masses and bowel sounds normal  MS: left foot in a dressing   SKIN: no suspicious lesions or rashes      Data   Lab Results   Component Value Date    WBC 12.4 (H) 12/03/2019    HGB 11.1 (L) 12/03/2019    HCT 33.6 (L) 12/03/2019     (H) 12/03/2019     12/03/2019    POTASSIUM 4.2 12/03/2019    CHLORIDE 103 12/03/2019    CO2 27 12/03/2019    BUN 28 12/03/2019    CR 1.06 12/03/2019     (H) 12/03/2019    SED 88 (H) 12/03/2019    AST 15 12/03/2019    ALT 40 12/03/2019    ALKPHOS 95 12/03/2019    BILITOTAL 0.3 12/03/2019     No results for input(s): CULT in the last 168 hours.  Recent Labs   Lab Test 11/19/19  1829 11/17/19  1525 11/17/19  1419 11/17/19  1411 08/28/17  1136 08/28/17  1059 08/28/17  1054 07/15/16  1521 07/14/16  1029   CULT Light growth  Bacteroides fragilis  *  Light growth  Parvimonas micra  *  Susceptibility testing not routinely done  On day 2, isolated in broth only:  beta hemolytic   Streptococcus constellatus  * Heavy growth  beta hemolytic   Streptococcus constellatus  Susceptibility testing done on previous specimen  *  Light growth  Alcaligenes faecalis  *  Light growth  Staphylococcus aureus  *  On day 1, isolated in broth only:  Anaerobic gram negative rods  See anaerobic report for identification  * Heavy growth  Bacteroides fragilis  Susceptibility testing not routinely done  *  Heavy growth  Peptoniphilus asaccharolyticus  Susceptibility testing not routinely done  *  Moderate growth  beta hemolytic   Streptococcus constellatus  *  On day 1, isolated in broth only:  Anaerobic gram negative rods  See  anaerobic report for identification  * Heavy growth  Bacteroides fragilis  *  Heavy growth  Parvimonas micra  *  Heavy growth  Peptoniphilus asaccharolyticus  *  Heavy growth  Mixed aerobic and anaerobic rosa  *  Susceptibility testing not routinely done No growth No growth Heavy growth  Beta hemolytic Streptococcus group G  *  Moderate growth  Proteus vulgaris  *  Moderate growth  Normal skin rosa   No anaerobes isolated  On day 1, isolated in broth only: Staphylococcus simulans This isolate is   presumed to be clindamycin resistant based on detection of inducible   clindamycin resistance. Erythromycin and clindamycin are resistant, therefore,   they are not recommended for use.  On day 1, isolated in broth only: Strain 2 Staphylococcus simulans This isolate   is presumed to be clindamycin resistant based on detection of inducible   clindamycin resistance. Erythromycin and clindamycin are resistant, therefore,   they are not recommended for use.  Strain 1 found to have inducible  Clindamycin resistance also,  results updated  * No growth       Amount of time performed on this consult: 45 minutes. This includes face to face assessment and care coordination with the primary team.         no known allergies

## 2021-12-13 ENCOUNTER — APPOINTMENT (OUTPATIENT)
Dept: NEUROLOGY | Facility: CLINIC | Age: 41
End: 2021-12-13
Payer: COMMERCIAL

## 2021-12-13 VITALS
HEIGHT: 70 IN | HEART RATE: 80 BPM | BODY MASS INDEX: 19.18 KG/M2 | DIASTOLIC BLOOD PRESSURE: 71 MMHG | WEIGHT: 134 LBS | SYSTOLIC BLOOD PRESSURE: 108 MMHG

## 2021-12-13 PROCEDURE — 99215 OFFICE O/P EST HI 40 MIN: CPT

## 2021-12-14 NOTE — HISTORY OF PRESENT ILLNESS
[FreeTextEntry1] : Initial hx 11/2021\par Follows with Dr. Galo.\par 40 yo man with a hx of brain germinoma s/p radiation/chemotherapy 2000, was admitted to [facility] 5/2/2021-5/11/2021 for worsening baseline LLE weakness. MRI brain and spine were obtained. MRI brain showed non enhancing scattered subcortical and periventricular white matter lesions and a dorsal medulla lesion. MRI spine showed scattered non enhancing short segment cord lesions in cervical and thoracic cord and also involving conus, an enhancing left cord lesion was noted at C4-C5. A spinal tap was performed and showed 2 cells, protein 30, 5 unique Oligoclonal bands with elevated IgG index, MOG and NMO in CSF neg, paraneoplastic panel neg and CSF PCR neg. He was treated with 5 days of IV solumedrol with improvement in symptoms on discharge from hospital. \par Started DMF in early 7/2021. Not missing any doses.\par LLE hand and leg are getting weaker gradually over 6wks. Still better than when went to hospital. MRIs showed a few new cord lesions. Got HDOP in 11/2021 and felt better.\par No BB dysfunction.\par \par \par Subj interval:\par \par reports feeling well.\par \par Has not returned back to work yet.\par \par PMHX:\par hemochromatosis\par MS\par germinoma\par \par MEDS:\par iron infusions\par tecfidera\par vitD\par \par O:\par \par AO3. Normally conversant. Follows commands, names, and repeats. Good attention.\par \par PERRL, VFF, mild R LISA, nystagmus horizontal to left and upbeating on upgaze, face symmetric, TUP at midline.\par \par Motor: \par    R:  L:\par Del   5  5\par Bi   5  5\par Tri   5  5\par Wrist Extensors  5  5\par Finger abductors  5  5-\par    5  5\par \par HF   5  5\par KE   5  5\par KF   5  5\par DF   5  5\par PF   5  5\par \par Sensory  RUE  LUE  RLE LLE \par LT   +  +  +  +\par Vib   +  +  dec dec\par \par Reflexes:\par L sided reflex preponderance\par \par Coordination:\par    R  L  \par FTN   0  0 \par TODD   0  0\par \par Gait: drags LLE\par \par \par MRI brain 11/2021 - unchanged\par \par MRI C spine 11/2021 - new C4 eri+ lesion, possibly some other new t2h but extensive burden and differences in technique make direct comparison extremely difficult.\par \par MRI T spine 11/2021 - new T6 eri+ lesion\par \par \par AP: 42yo w/ hx of brain germinoma, recent MS diagnosis. Report of worsening L sided weakness - I only note subtle L sided weakness and I'm not sure what his baseline was. However, given report of worsening weakness and eri+ lesions on MRI, with extensive lesion burden, got HDOP in 11/2021. Planned to start ocrevus.\par \par all questions answered, education provided, management discussed at length.\par \par - not interested in PT but doing exercises on his own.\par - start ocrevus. discussed full risk/benefit profile, including infectious, neoplastic, and infusion-related reactions. Discussed with Dr. Galo.\par - cont vitD3 50ku weekly, can switch to vitD3 5ku daily if still low in several months.\par - f/u with hematology later this week, advised pt to discuss ocrevus but per Dr. Galo's conversation with hematology, no CI to MS DMTs.\par - RTC w Dr. Galo ~6wks after starting ocrevus

## 2021-12-20 ENCOUNTER — TRANSCRIPTION ENCOUNTER (OUTPATIENT)
Age: 41
End: 2021-12-20

## 2021-12-27 ENCOUNTER — LABORATORY RESULT (OUTPATIENT)
Age: 41
End: 2021-12-27

## 2021-12-28 ENCOUNTER — TRANSCRIPTION ENCOUNTER (OUTPATIENT)
Age: 41
End: 2021-12-28

## 2021-12-28 ENCOUNTER — APPOINTMENT (OUTPATIENT)
Dept: MRI IMAGING | Facility: IMAGING CENTER | Age: 41
End: 2021-12-28

## 2021-12-28 LAB
ALBUMIN SERPL ELPH-MCNC: 5.2 G/DL
ALP BLD-CCNC: 74 U/L
ALT SERPL-CCNC: 23 U/L
AST SERPL-CCNC: 17 U/L
BASOPHILS # BLD AUTO: 0.02 K/UL
BASOPHILS NFR BLD AUTO: 0.3 %
BILIRUB DIRECT SERPL-MCNC: 0.2 MG/DL
BILIRUB INDIRECT SERPL-MCNC: 0.4 MG/DL
BILIRUB SERPL-MCNC: 0.6 MG/DL
EOSINOPHIL # BLD AUTO: 0.02 K/UL
EOSINOPHIL NFR BLD AUTO: 0.3 %
HCT VFR BLD CALC: 38.6 %
HGB BLD-MCNC: 10.7 G/DL
IMM GRANULOCYTES NFR BLD AUTO: 0.3 %
LYMPHOCYTES # BLD AUTO: 1.37 K/UL
LYMPHOCYTES NFR BLD AUTO: 21.4 %
MAN DIFF?: NORMAL
MCHC RBC-ENTMCNC: 17.1 PG
MCHC RBC-ENTMCNC: 27.7 GM/DL
MCV RBC AUTO: 61.7 FL
MONOCYTES # BLD AUTO: 0.53 K/UL
MONOCYTES NFR BLD AUTO: 8.3 %
NEUTROPHILS # BLD AUTO: 4.45 K/UL
NEUTROPHILS NFR BLD AUTO: 69.4 %
PLATELET # BLD AUTO: 172 K/UL
PROT SERPL-MCNC: 7.4 G/DL
RBC # BLD: 6.26 M/UL
RBC # FLD: 23.9 %
WBC # FLD AUTO: 6.41 K/UL

## 2022-01-05 ENCOUNTER — NON-APPOINTMENT (OUTPATIENT)
Age: 42
End: 2022-01-05

## 2022-01-07 ENCOUNTER — APPOINTMENT (OUTPATIENT)
Dept: NEUROLOGY | Facility: CLINIC | Age: 42
End: 2022-01-07
Payer: COMMERCIAL

## 2022-01-07 VITALS — HEART RATE: 85 BPM | SYSTOLIC BLOOD PRESSURE: 106 MMHG | RESPIRATION RATE: 16 BRPM | DIASTOLIC BLOOD PRESSURE: 67 MMHG

## 2022-01-07 PROCEDURE — 96413 CHEMO IV INFUSION 1 HR: CPT

## 2022-01-07 PROCEDURE — 96415 CHEMO IV INFUSION ADDL HR: CPT

## 2022-01-07 PROCEDURE — S0028: CPT

## 2022-01-07 RX ORDER — METHYLPREDNISOLONE 125 MG/2ML
125 INJECTION, POWDER, LYOPHILIZED, FOR SOLUTION INTRAMUSCULAR; INTRAVENOUS
Qty: 0 | Refills: 0 | Status: COMPLETED | OUTPATIENT
Start: 2022-01-07

## 2022-01-07 RX ORDER — FAMOTIDINE 10 MG/ML
20 INJECTION, SOLUTION INTRAVENOUS
Refills: 0 | Status: COMPLETED | OUTPATIENT
Start: 2022-01-07

## 2022-01-07 RX ORDER — OCRELIZUMAB 300 MG/10ML
300 INJECTION INTRAVENOUS
Refills: 0 | Status: COMPLETED | OUTPATIENT
Start: 2022-01-07

## 2022-01-07 RX ADMIN — OCRELIZUMAB 0 MG/10ML: 300 INJECTION INTRAVENOUS at 00:00

## 2022-01-18 ENCOUNTER — APPOINTMENT (OUTPATIENT)
Dept: NEUROLOGY | Facility: CLINIC | Age: 42
End: 2022-01-18

## 2022-01-27 ENCOUNTER — APPOINTMENT (OUTPATIENT)
Dept: NEUROLOGY | Facility: CLINIC | Age: 42
End: 2022-01-27
Payer: COMMERCIAL

## 2022-01-27 VITALS
HEIGHT: 70 IN | BODY MASS INDEX: 19.61 KG/M2 | WEIGHT: 137 LBS | RESPIRATION RATE: 16 BRPM | SYSTOLIC BLOOD PRESSURE: 113 MMHG | DIASTOLIC BLOOD PRESSURE: 67 MMHG | HEART RATE: 68 BPM

## 2022-01-27 PROCEDURE — 96415 CHEMO IV INFUSION ADDL HR: CPT

## 2022-01-27 PROCEDURE — 96413 CHEMO IV INFUSION 1 HR: CPT

## 2022-01-27 PROCEDURE — S0028: CPT

## 2022-02-10 ENCOUNTER — APPOINTMENT (OUTPATIENT)
Dept: NEUROLOGY | Facility: CLINIC | Age: 42
End: 2022-02-10
Payer: COMMERCIAL

## 2022-02-10 VITALS
BODY MASS INDEX: 19.61 KG/M2 | HEART RATE: 82 BPM | DIASTOLIC BLOOD PRESSURE: 78 MMHG | SYSTOLIC BLOOD PRESSURE: 122 MMHG | WEIGHT: 137 LBS | HEIGHT: 70 IN

## 2022-02-10 DIAGNOSIS — R29.898 OTHER SYMPTOMS AND SIGNS INVOLVING THE MUSCULOSKELETAL SYSTEM: ICD-10-CM

## 2022-02-10 PROCEDURE — 99214 OFFICE O/P EST MOD 30 MIN: CPT

## 2022-02-10 RX ORDER — PREDNISONE 10 MG/1
10 TABLET ORAL
Qty: 21 | Refills: 0 | Status: DISCONTINUED | COMMUNITY
Start: 2021-11-08 | End: 2022-02-10

## 2022-02-10 RX ORDER — DIMETHYL FUMARATE 240 MG/1
240 CAPSULE, DELAYED RELEASE ORAL
Qty: 60 | Refills: 5 | Status: DISCONTINUED | COMMUNITY
Start: 2021-08-09 | End: 2022-02-10

## 2022-02-10 RX ORDER — PREDNISONE 50 MG/1
50 TABLET ORAL DAILY
Qty: 125 | Refills: 0 | Status: DISCONTINUED | COMMUNITY
Start: 2021-11-08 | End: 2022-02-10

## 2022-02-10 NOTE — PHYSICAL EXAM
[FreeTextEntry1] : PHYSICAL EXAM\par Constitutional: Alert, no acute distress \par Neck: Full range of motion\par Psychiatric: appropriate affect and mood\par Pulmonary: No respiratory distress, stable on room air\par \par NEUROLOGICAL EXAM\par Mental status: The patient is alert, attentive, and oriented.\par Speech/language: Clear and fluent with good repetition, comprehension, and naming\par Cranial nerves:\par CN II: Visual fields are full to confrontation. Pupil size equal and briskly reactive to light. No RAPD. No red color desaturation\par CN III, IV, VI: Mild RIGHT LISA, no ptosis\par CN V: Facial sensation is intact to pinprick in all 3 divisions bilaterally.\par CN VII: Face is symmetric with normal eye closure and smile.\par CN VII: Hearing is normal to rubbing fingers\par CN IX, X: Palate elevates symmetrically. Phonation is normal.\par CN XI: Head turning and shoulder shrug are intact\par CN XII: Tongue is midline with normal movements and no atrophy.\par Motor:  5/5 b/l UE, 5/5 RLE, 5/5 proximal LLE and distally dorsiflexion 4+/5.\par Reflexes: more brisk on the left compared to the right with 2 beat clonus on the left. NO Babinski. No Thrasher\par Sensory: Intact to light touch and pinprick in all extremities. Joint proprioception  intact in toes bilaterally. Vibration 8 seconds at right big toe and 15 seconds at right knee, 6 seconds at left big toe and 10 seconds at left knee.\par Coordination: Slight dysmetria on finger to nose on the left\par Gait/Stance: high steepage gait on left with frequent catching of left toes on the ground. Left leg circumduction.\par \par \par \par \par

## 2022-02-10 NOTE — ASSESSMENT
[FreeTextEntry1] : Assessment/Plan:\par Relapsing multiple sclerosis (dx'd 5/2021), with high disease burden in spine and breakthrough disease on Tecfidera 10/2021 (left hand numbness and worsening LLE weakness w/ evidence of 2 spine enhancing lesions - C4 and T6, s/p solumedrol with symptom improvement), now Ocrevus (since 1/2022) and clinically stable. \par \par Plan: \par 1. Diagnostic Plan/Imaging: \par [] Will plan to repeat MRI brain, cervical and thoracic spine w/w/o 4/2021, for new baseline on Ocrevus\par \par 2. Disease Modifying therapy plan:.\par Continue Ocrelizumab 600 mg [id number]\par Ocrevus labs every 6 months\par \par \par 3. Symptomatic therapy plan:\par () Vitamin D3 and B12 supplementations \par \par Return to clinic 3 months\par \par Available imaging studies and/or blood work up were reviewed. A detailed chart review was completed prior to visit.\par \par The above plan was discussed with ARLIN YO in great detail. ARLIN YO verbalized understanding and agrees with plan as detailed above. Patient was provided education and counselling on current diagnosis/symptoms, diagnostic work up, treatment options and potential side effects of any prescribed therapy/therapies. He was advised to call our clinic at [phone] for any new or worsening symptoms, or with any questions or concerns. In case of acute onset of neurological symptoms or worsening presentation, patient was advised to present to nearest emergency room for further evaluation. ARLIN YO expressed understanding and all his questions/concerns were addressed.\par \par Wendy Galo MD

## 2022-02-10 NOTE — DATA REVIEWED
[de-identified] : MRI Brain 11/3/2021 w/w/o- stable compared to 5/2021\par MRI cervical spine 11/3/2021 w.w.o-extensive patchy disease burden. Previous seen enhancement and expansion at C5 resolved. NEW lesion at C4 with mild enhancement posteriorly. \par MRI thoracic spine 11/3/2021 w.w.o-extensive disease burden. NEW +eri lesion at left hemicord at T6/7.\par MRI brain 5/3/2021:-\par “Brain MRI: Unchanged encephalomalacia and gliosis within the genuine of the corpus callosum.\par \par Increased signal intensity along the dorsal medulla without enhancement is nonspecific in appearance. Increasing number of foci of elevated signal intensity within the subcortical and periventricular white matter. Differential diagnosis includes ischemic, infectious/inflammatory and demyelinating processes. Cervical spine MRI with contrast may be valuable for further evaluation.\par \par New small inferior left frontal meningioma.”\par \par \par MRI cervical and thoracic spine 5/5/2021:-\par “Enhancing, T2 hyperintense, and slightly expansile lesion is noted in the cord at left C4-C5.\par \par Additional T2 hyperintense but nonenhancing lesions are identified throughout the spinal cord at the dorsal aspect of the medulla, right hemicord at C1 level, left hemicord at C3, left hemicord at T1, right hemicord at T3-T4, right ventral cord at T11-T12, and right hemicord at T12.\par IMPRESSION:\par \par -Enhancing left cord lesion at C4-C5 most compatible with acute demyelination. Less likely consideration is metastasis in this patient with history of germinoma. Multiple additional nonenhancing lesions throughout the spinal cord, most compatible with chronic demyelination. Correlate with symptomatology and CSF analysis”\par \par \par MRI Lumbar spine 5/7/2021:-\par “IMPRESSION: Evidence of T2 hyperintensity in the right aspect of the cord at T11-12 as well as more inferiorly at T12-L1 level extending to the tip of the conus without associated enhancement or expansion of the cord likely chronic demyelinating pathology.”\par \par

## 2022-03-23 ENCOUNTER — NON-APPOINTMENT (OUTPATIENT)
Age: 42
End: 2022-03-23

## 2022-03-23 ENCOUNTER — APPOINTMENT (OUTPATIENT)
Dept: CARDIOLOGY | Facility: CLINIC | Age: 42
End: 2022-03-23
Payer: COMMERCIAL

## 2022-03-23 VITALS
TEMPERATURE: 98.3 F | SYSTOLIC BLOOD PRESSURE: 110 MMHG | WEIGHT: 138 LBS | DIASTOLIC BLOOD PRESSURE: 70 MMHG | BODY MASS INDEX: 19.76 KG/M2 | HEART RATE: 88 BPM | OXYGEN SATURATION: 98 % | HEIGHT: 70 IN

## 2022-03-23 DIAGNOSIS — R01.1 CARDIAC MURMUR, UNSPECIFIED: ICD-10-CM

## 2022-03-23 DIAGNOSIS — Z71.85 ENCOUNTER FOR IMMUNIZATION SAFETY COUNSELING: ICD-10-CM

## 2022-03-23 DIAGNOSIS — Z12.5 ENCOUNTER FOR SCREENING FOR MALIGNANT NEOPLASM OF PROSTATE: ICD-10-CM

## 2022-03-23 DIAGNOSIS — E55.9 VITAMIN D DEFICIENCY, UNSPECIFIED: ICD-10-CM

## 2022-03-23 DIAGNOSIS — Z13.228 ENCOUNTER FOR SCREENING FOR OTHER METABOLIC DISORDERS: ICD-10-CM

## 2022-03-23 PROCEDURE — 99214 OFFICE O/P EST MOD 30 MIN: CPT

## 2022-03-23 PROCEDURE — 93000 ELECTROCARDIOGRAM COMPLETE: CPT

## 2022-03-23 NOTE — PHYSICAL EXAM
[Well Developed] : well developed [Well Nourished] : well nourished [No Acute Distress] : no acute distress [Normal Conjunctiva] : normal conjunctiva [Normal Venous Pressure] : normal venous pressure [No Carotid Bruit] : no carotid bruit [Normal S1, S2] : normal S1, S2 [No Rub] : no rub [No Gallop] : no gallop [Clear Lung Fields] : clear lung fields [Good Air Entry] : good air entry [No Respiratory Distress] : no respiratory distress  [Soft] : abdomen soft [Non Tender] : non-tender [No Masses/organomegaly] : no masses/organomegaly [Normal Bowel Sounds] : normal bowel sounds [Normal Gait] : normal gait [No Edema] : no edema [No Cyanosis] : no cyanosis [No Clubbing] : no clubbing [No Varicosities] : no varicosities [No Rash] : no rash [No Skin Lesions] : no skin lesions [Moves all extremities] : moves all extremities [No Focal Deficits] : no focal deficits [Normal Speech] : normal speech [Alert and Oriented] : alert and oriented [Normal memory] : normal memory [Murmur] : murmur [de-identified] : 1/6 systolic murmur

## 2022-03-23 NOTE — HISTORY OF PRESENT ILLNESS
[FreeTextEntry1] : This is a 41 year old gentlemen with a PMH of Multiple Sclerosis, Anemia, and Thyroid Nodule who presents today for follow up visit. He had follow up with hematology today and CBC is stable at this time. He is s/p Enteroscopy with Dr. Henley which was negative for any sources of bleeding. He feels overall well today with no acute complaints. Denies chest pain, dyspnea, dizziness, palpations, changes in appetite/bowel habits, nausea, vomiting, abdominal pain.\par

## 2022-04-06 ENCOUNTER — APPOINTMENT (OUTPATIENT)
Dept: GASTROENTEROLOGY | Facility: CLINIC | Age: 42
End: 2022-04-06
Payer: COMMERCIAL

## 2022-04-06 VITALS
DIASTOLIC BLOOD PRESSURE: 80 MMHG | WEIGHT: 140 LBS | SYSTOLIC BLOOD PRESSURE: 120 MMHG | BODY MASS INDEX: 20.73 KG/M2 | HEIGHT: 69 IN

## 2022-04-06 PROCEDURE — 99204 OFFICE O/P NEW MOD 45 MIN: CPT

## 2022-04-07 NOTE — PHYSICAL EXAM
[General Appearance - Alert] : alert [General Appearance - In No Acute Distress] : in no acute distress [Sclera] : the sclera and conjunctiva were normal [PERRL With Normal Accommodation] : pupils were equal in size, round, and reactive to light [Extraocular Movements] : extraocular movements were intact [Neck Appearance] : the appearance of the neck was normal [Neck Cervical Mass (___cm)] : no neck mass was observed [Jugular Venous Distention Increased] : there was no jugular-venous distention [Thyroid Diffuse Enlargement] : the thyroid was not enlarged [Thyroid Nodule] : there were no palpable thyroid nodules [Heart Rate And Rhythm] : heart rate was normal and rhythm regular [Heart Sounds] : normal S1 and S2 [Heart Sounds Gallop] : no gallops [Murmurs] : no murmurs [Heart Sounds Pericardial Friction Rub] : no pericardial rub [Bowel Sounds] : normal bowel sounds [Abdomen Soft] : soft [Abdomen Tenderness] : non-tender [] : no hepato-splenomegaly [Abdomen Mass (___ Cm)] : no abdominal mass palpated

## 2022-04-07 NOTE — HISTORY OF PRESENT ILLNESS
[FreeTextEntry1] : He is a 41 year old asymptomatic male , who was admitted to the hospital last year after feeling weak and pale and was found to have a hemoglobin of  6 grams.  He denies rectal bleeding or melena. He denies abdominal pain or weight loss. He denied using NSAIDS. He had multiple units of blood transfused and subsequently underwent a colonoscopy and an endoscopy. which were negative. He has had no recurrence or his anemia. This yea,  he underwent a Capsule Endoscopy  and a small bowel enteroscopy, both of which were normal. His latest labs from March 23rd revealed a normal hemoglobin and hematocrit but a low MCV, His latest iron levels from last November was 13%. He is feeling well and has no complaints at all. He is not on iron replacement.

## 2022-04-07 NOTE — ASSESSMENT
[FreeTextEntry1] : He is a 41 year old male with a history of iron deficiency anemia, with an extensive negative work up. He presently  feels well, is not anemic ,but has  a low MCV and a 13% iron level as of Nov 2021. He probably is still iron deficient. \par \par There is no evidence of active GI bleeding and he had an extensive GI  work up which was negative.\par \par I recommended hat he replace his iron  by taking iron 2x/ day and return to the office in 1 month for blood tests.\par \par Further work up,  if any, will depend on the result of these tests

## 2022-04-27 ENCOUNTER — APPOINTMENT (OUTPATIENT)
Dept: MRI IMAGING | Facility: CLINIC | Age: 42
End: 2022-04-27
Payer: COMMERCIAL

## 2022-04-27 ENCOUNTER — OUTPATIENT (OUTPATIENT)
Dept: OUTPATIENT SERVICES | Facility: HOSPITAL | Age: 42
LOS: 1 days | End: 2022-04-27
Payer: COMMERCIAL

## 2022-04-27 DIAGNOSIS — G35 MULTIPLE SCLEROSIS: ICD-10-CM

## 2022-04-27 DIAGNOSIS — Z00.8 ENCOUNTER FOR OTHER GENERAL EXAMINATION: ICD-10-CM

## 2022-04-27 PROCEDURE — 72156 MRI NECK SPINE W/O & W/DYE: CPT | Mod: 26

## 2022-04-27 PROCEDURE — 72156 MRI NECK SPINE W/O & W/DYE: CPT

## 2022-04-27 PROCEDURE — 72157 MRI CHEST SPINE W/O & W/DYE: CPT | Mod: 26

## 2022-04-27 PROCEDURE — 70553 MRI BRAIN STEM W/O & W/DYE: CPT | Mod: 26

## 2022-04-27 PROCEDURE — A9585: CPT

## 2022-04-27 PROCEDURE — 70553 MRI BRAIN STEM W/O & W/DYE: CPT

## 2022-04-27 PROCEDURE — 72157 MRI CHEST SPINE W/O & W/DYE: CPT

## 2022-05-10 ENCOUNTER — APPOINTMENT (OUTPATIENT)
Dept: NEUROLOGY | Facility: CLINIC | Age: 42
End: 2022-05-10
Payer: COMMERCIAL

## 2022-05-10 VITALS
HEIGHT: 69 IN | SYSTOLIC BLOOD PRESSURE: 118 MMHG | WEIGHT: 140 LBS | DIASTOLIC BLOOD PRESSURE: 71 MMHG | BODY MASS INDEX: 20.73 KG/M2 | HEART RATE: 68 BPM

## 2022-05-10 PROCEDURE — 99214 OFFICE O/P EST MOD 30 MIN: CPT

## 2022-05-10 NOTE — HISTORY OF PRESENT ILLNESS
[FreeTextEntry1] : INTERIM HX 05/10/2022: Repeat MRI imaging completed 4/27/2022 showed and a NEW lesion noted in right cervical hemicord at C3 with punctate contrast enhancement compared with 11/3/2021, otherwise stable MRI brain, cervical and thoracic spine with resolution of previously noted enhancing cord lesions. Again noted is large right thyroid nodule. Next Ocrevus infusion is in July. He denies any new MS symptom. He has been stable. No progression of symptoms. He drove himself to the clinic today and was by himself. \par  \par INTERIM HX 02/10/2022: In October patient reported worsening weakness of left leg and hand x 6 weeks. HE was seen by Dr. Trammell for evaluation. Repeat MRI brain and spine imaging were ordered. He had been on compliant on Tecfidera. MRI imaging completed 11/3/2021, and MRI brain was stable, MRI cervical spine showed NEW +eri lesion at C4 and MRI T spine with NEW + eri lesion at T6. Given new +eri lesions on MRI after 3 months of being on Tecfidera and given high disease burden in spine, it was planned to switch him to Ocrevus. S/p steroids Nov 2021 with some improvement in symptoms. Now s/p Ocrevus loading doses (1/7 and 1/27). He tolerated Ocrevus. He fells a lot better now. \par \par INTERIM HX 07/16/2021: MOG neg. JCV antibody indeterminate (0.22). Repeat VZV IgM antibody positive (discussed with primary, Dr. Ponce, and this is likely false positive old vzv). He started Tecfidera on 6/30/2021 and denies any side effects, no flushing or GI side effects. He has been doing PT and reports he feels better and PT have noticed good progress. \par \par INTERIM HX 06/04/2021: Spoke to Dr. Ortiz (hem/onc) and he confirmed that patient has beta thalassemia with secondary hemochromatosis. He had recommended patient get a capsule study to rule out small bowel bleed to explain significant anemia in the hospital- this is scheduled 6/9/2021. I had discussed diagnosis of MS with him and he stated no contraindications to considering any DMT as needed. \par \par Labs reviewed (6/2/2021): Quant TB neg, TSH normal, Hep B surface ab +, Hep B surface antigen core antibody, IgM negative, Lyme neg, NMO neg, ACE neg, DsDNA neg, SSA/SB neg.\par Varicella zoster IgG ab positive and IgM elevated (2.44)\par immunoglobulin panel with elevated IgM 347 (normal )\par MOG and JCV antibody pending\par \par HPI (initial visit May 21, 2021)- Timothy is a 39 yo man with a hx of brain germinoma s/p radiation/chemotherapy 2000, was admitted to [facility] 5/2/2021-5/11/2021 for worsening baseline LLE weakness. MRI brain and spine were obtained. MRI brain showed non enhancing scattered subcortical and periventricular white matter lesions and a dorsal medulla lesion. MRI spine showed scattered non enhancing short segment cord lesions in cervical and thoracic cord and also involving conus, an enhancing left cord lesion was noted at C4-C5. A spinal tap was performed and showed 2 cells, protein 30, 5 unique Oligoclonal bands with elevated IgG index, MOG and NMO in CSF neg, paraneoplastic panel neg and CSF PCR neg. Serum RICHARD, RF neg. B12 363. COVID PCR negative. UDS negative. \par \par He was treated with 5 days of IV solumedrol with improvement in symptoms on discharge from hospital. \par \par Of note, patient also has a long standing hx of hemochromatosis requiring phlebotomies. In the hospital it was noted that he had anemia (Hb 6.1), Lymphopenia (2.44) and thrombocytopenia (109) of unclear etiology and is to follow up with hematology/oncology. He was found to have low reticulocyte count and a bone marrow biopsy is planned as outpatient for possible myelodysplastic syndrome. 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  Also noted to have elevated PTT and mildy low levels of factor 8 and 9. Also found to have low ferritin and + FOBT and endoscopy and colonoscopy did not reveal any source of bleed. \par \par most recent CBC 5/19/2021:-\par wbc 9.96, hb 8.9, platelet 208, absolute lymph 1.30 k/ul\par CMP normal\par Serum iron normal. % saturation low (13)\par Ferritin normal\par \par He returns for follow up appointment and is accompanied by his two sisters. He reports the left foot drop has not significantly improved. He continues to drag his left leg and foot while he walks and scuff the foot against the ground when walking. He reports the weakness in left leg had initially started after his diagnosis of germinoma. He does not recall having a MRI of the spine at the time. He also recalls that about 4 years ago he had trouble with his vision, where whenever he would look to the left he would not be able to focus on objects. He adds that he has not been feeling well for several months and has had low energy. \par

## 2022-05-10 NOTE — PHYSICAL EXAM
[FreeTextEntry1] : Motor:  5/5 b/l UE, 5/5 RLE, 5/5 proximal LLE and distally dorsiflexion 4+/5. LLE inversion, eversion and plantar flexion 5/5. \par Reflexes: more brisk on the left compared to the right with 2 beat clonus on the left. NO Babinski. No Thrasher\par Sensory: Intact to light touch and pinprick in all extremities. Joint proprioception intact in toes bilaterally. Vibration 8 seconds at right big toe and 15 seconds at right knee, 6 seconds at left big toe and 10 seconds at left knee.\par Coordination: Slight dysmetria on finger to nose on the left\par Gait/Stance: Abnormal gait. high steppage gait on left with left leg circumduction ( turning of leg inwards and frequent catching of left toes on the ground)\par \par \par \par \par

## 2022-05-10 NOTE — DATA REVIEWED
[de-identified] : Repeat MRI imaging completed 4/27/2022 showed and a NEW lesion noted in right cervical hemicord at C3 with punctate contrast enhancement compared with 11/3/2021, otherwise stable MRI brain, cervical and thoracic spine with resolution of previously noted enhancing cord lesions. Again noted is large right thyroid nodule. \par \par MRI Brain 11/3/2021 w/w/o- stable compared to 5/2021\par MRI cervical spine 11/3/2021 w.w.o-extensive patchy disease burden. Previous seen enhancement and expansion at C5 resolved. NEW lesion at C4 with mild enhancement posteriorly. \par MRI thoracic spine 11/3/2021 w.w.o-extensive disease burden. NEW +eri lesion at left hemicord at T6/7.\par MRI brain 5/3/2021:-\par “Brain MRI: Unchanged encephalomalacia and gliosis within the genuine of the corpus callosum.\par \par Increased signal intensity along the dorsal medulla without enhancement is nonspecific in appearance. Increasing number of foci of elevated signal intensity within the subcortical and periventricular white matter. Differential diagnosis includes ischemic, infectious/inflammatory and demyelinating processes. Cervical spine MRI with contrast may be valuable for further evaluation.\par \par New small inferior left frontal meningioma.”\par \par \par MRI cervical and thoracic spine 5/5/2021:-\par “Enhancing, T2 hyperintense, and slightly expansile lesion is noted in the cord at left C4-C5.\par \par Additional T2 hyperintense but nonenhancing lesions are identified throughout the spinal cord at the dorsal aspect of the medulla, right hemicord at C1 level, left hemicord at C3, left hemicord at T1, right hemicord at T3-T4, right ventral cord at T11-T12, and right hemicord at T12.\par IMPRESSION:\par \par -Enhancing left cord lesion at C4-C5 most compatible with acute demyelination. Less likely consideration is metastasis in this patient with history of germinoma. Multiple additional nonenhancing lesions throughout the spinal cord, most compatible with chronic demyelination. Correlate with symptomatology and CSF analysis”\par \par \par MRI Lumbar spine 5/7/2021:-\par “IMPRESSION: Evidence of T2 hyperintensity in the right aspect of the cord at T11-12 as well as more inferiorly at T12-L1 level extending to the tip of the conus without associated enhancement or expansion of the cord likely chronic demyelinating pathology.”\par \par

## 2022-05-10 NOTE — ASSESSMENT
[FreeTextEntry1] : Assessment/Plan:\par Relapsing multiple sclerosis (dx'd 5/2021), with high spinal disease burden with recent breakthrough disease on Tecfidera 10/2021 (left hand numbness and worsening LLE weakness) w/ evidence of 2 NEW spine enhancing lesions - C4 and T6, s/p solumedrol with symptom improvement, and now Ocrevus (loading doses 1/7/2022 and 1/27/2022). He has been clinically stable from MS standpoint, no new or worsening symptoms, however a repeat MRI of cervical spine showed a NEW focus of T2 signal abnormality at C3 with punctuate eri enhancement suggestive of active disease. \par \par Relapsing Multiple sclerosis (dx'd 5/2021), on Ocrevus since 1/2022 with ACTIVE disease. I would not consider this a treatment failure at this time. Contrast enhancing lesions can last up to 8 weeks and given the timeline of events, this lesion could have developed sometime the end of Feb 2022, 4 weeks after 2nd loading dose of Ocrevus, and possibly prior to Ocrevus having reached peak efficacy. Of note, he has had a very active disease in the last year and likely needed more time to stabilize on treatment.  I will plan to repeat MRI imaging in 3 months. Additionally, I will also check CD19% to make sure Ocrevus is effective and that he is not repopulation his B cells too soon. \par \par Plan: \par 1. Diagnostic Plan/Imaging: \par [] Will plan to repeat MRI brain, cervical and thoracic spine w/w/o July 2022 (will order at next visit), for radiological stability and new baseline on Ocrevus\par \par 2. Disease Modifying therapy plan:.\par Continue Ocrelizumab 600 mg [id number]\par Ocrevus labs every 6 months\par Will check CD19%\par \par \par 3. Symptomatic therapy plan:\par () Vitamin D3 and B12 supplementations \par \par Return to clinic 3 months. July 28th, 2022 (same day as infusion)\par \par Available imaging studies and/or blood work up were reviewed. A detailed chart review was completed prior to visit.\par \par The above plan was discussed with ARLIN YO in great detail. ARLIN GIANNAKAS verbalized understanding and agrees with plan as detailed above. Patient was provided education and counselling on current diagnosis/symptoms, diagnostic work up, treatment options and potential side effects of any prescribed therapy/therapies. He was advised to call our clinic at [phone] for any new or worsening symptoms, or with any questions or concerns. In case of acute onset of neurological symptoms or worsening presentation, patient was advised to present to nearest emergency room for further evaluation. ARLIN SANAZ expressed understanding and all his questions/concerns were addressed.\par \par Wendy Galo MD

## 2022-05-11 ENCOUNTER — APPOINTMENT (OUTPATIENT)
Dept: GASTROENTEROLOGY | Facility: CLINIC | Age: 42
End: 2022-05-11
Payer: COMMERCIAL

## 2022-05-11 VITALS
HEIGHT: 69 IN | WEIGHT: 140 LBS | BODY MASS INDEX: 20.73 KG/M2 | SYSTOLIC BLOOD PRESSURE: 104 MMHG | DIASTOLIC BLOOD PRESSURE: 70 MMHG

## 2022-05-11 DIAGNOSIS — D64.9 ANEMIA, UNSPECIFIED: ICD-10-CM

## 2022-05-11 PROCEDURE — 99213 OFFICE O/P EST LOW 20 MIN: CPT

## 2022-05-11 NOTE — HISTORY OF PRESENT ILLNESS
[FreeTextEntry1] : He required blood transfusions in the hospital and underwent a colonoscopy, endoscopy and Capsule endoscopy, all of which were normal. He was started on iron and saw his hematologist last week where his recent labs revealed a hemoglobin of 13 and a normal  ferritin level.

## 2022-05-11 NOTE — CONSULT LETTER
[Dear  ___] : Dear  [unfilled], [Consult Letter:] : I had the pleasure of evaluating your patient, [unfilled]. [( Thank you for referring [unfilled] for consultation for _____ )] : Thank you for referring [unfilled] for consultation for [unfilled] [Please see my note below.] : Please see my note below. [Consult Closing:] : Thank you very much for allowing me to participate in the care of this patient.  If you have any questions, please do not hesitate to contact me. [Sincerely,] : Sincerely, [FreeTextEntry3] : Don\par \par Sebastian White MD\par \par Gastroenterology\par [facility]\par [facility], [place], operated by [facility]\par \par

## 2022-05-11 NOTE — PHYSICAL EXAM
[Outer Ear] : the ears and nose were normal in appearance [Oropharynx] : the oropharynx was normal [Neck Appearance] : the appearance of the neck was normal [Neck Cervical Mass (___cm)] : no neck mass was observed [Jugular Venous Distention Increased] : there was no jugular-venous distention [Thyroid Diffuse Enlargement] : the thyroid was not enlarged [Thyroid Nodule] : there were no palpable thyroid nodules [Auscultation Breath Sounds / Voice Sounds] : lungs were clear to auscultation bilaterally [Bowel Sounds] : normal bowel sounds [Abdomen Soft] : soft [Abdomen Tenderness] : non-tender [] : no hepato-splenomegaly [Abdomen Mass (___ Cm)] : no abdominal mass palpated

## 2022-05-16 ENCOUNTER — NON-APPOINTMENT (OUTPATIENT)
Age: 42
End: 2022-05-16

## 2022-06-13 ENCOUNTER — RX RENEWAL (OUTPATIENT)
Age: 42
End: 2022-06-13

## 2022-06-13 NOTE — REASON FOR VISIT
[Follow-Up: _____] : a [unfilled] follow-up visit Isotretinoin Counseling: Patient should get monthly blood tests, not donate blood, not drive at night if vision affected, not share medication, and not undergo elective surgery for 6 months after tx completed. Side effects reviewed, pt to contact office should one occur.

## 2022-06-28 ENCOUNTER — TRANSCRIPTION ENCOUNTER (OUTPATIENT)
Age: 42
End: 2022-06-28

## 2022-07-14 ENCOUNTER — LABORATORY RESULT (OUTPATIENT)
Age: 42
End: 2022-07-14

## 2022-07-14 LAB
ALBUMIN SERPL ELPH-MCNC: 5 G/DL
ALP BLD-CCNC: 69 U/L
ALT SERPL-CCNC: 18 U/L
AST SERPL-CCNC: 17 U/L
BILIRUB DIRECT SERPL-MCNC: 0.2 MG/DL
BILIRUB INDIRECT SERPL-MCNC: 0.7 MG/DL
BILIRUB SERPL-MCNC: 1 MG/DL
BUN SERPL-MCNC: 16 MG/DL
CREAT SERPL-MCNC: 1.11 MG/DL
EGFR: 85 ML/MIN/1.73M2
PROT SERPL-MCNC: 7 G/DL

## 2022-07-15 LAB
BASOPHILS # BLD AUTO: 0.03 K/UL
BASOPHILS NFR BLD AUTO: 0.6 %
CD16+CD56+ CELLS # BLD: 219 /UL
CD16+CD56+ CELLS NFR BLD: 22 %
CD19 CELLS NFR BLD: 1 /UL
CD3 CELLS # BLD: 749 /UL
CD3 CELLS NFR BLD: 75 %
CD3+CD4+ CELLS # BLD: 347 /UL
CD3+CD4+ CELLS NFR BLD: 35 %
CD3+CD4+ CELLS/CD3+CD8+ CLL SPEC: 0.99 RATIO
CD3+CD8+ CELLS # SPEC: 351 /UL
CD3+CD8+ CELLS NFR BLD: 35 %
CELLS.CD3-CD19+/CELLS IN BLOOD: <1 %
EOSINOPHIL # BLD AUTO: 0.09 K/UL
EOSINOPHIL NFR BLD AUTO: 1.8 %
HCT VFR BLD CALC: 37.5 %
HGB BLD-MCNC: 11.6 G/DL
IMM GRANULOCYTES NFR BLD AUTO: 0.4 %
LYMPHOCYTES # BLD AUTO: 1.13 K/UL
LYMPHOCYTES NFR BLD AUTO: 22.1 %
MAN DIFF?: NORMAL
MCHC RBC-ENTMCNC: 19.8 PG
MCHC RBC-ENTMCNC: 30.9 GM/DL
MCV RBC AUTO: 64 FL
MONOCYTES # BLD AUTO: 0.47 K/UL
MONOCYTES NFR BLD AUTO: 9.2 %
NEUTROPHILS # BLD AUTO: 3.37 K/UL
NEUTROPHILS NFR BLD AUTO: 65.9 %
PLATELET # BLD AUTO: 133 K/UL
RBC # BLD: 5.86 M/UL
RBC # FLD: 17.6 %
WBC # FLD AUTO: 5.11 K/UL

## 2022-07-19 ENCOUNTER — APPOINTMENT (OUTPATIENT)
Dept: GASTROENTEROLOGY | Facility: CLINIC | Age: 42
End: 2022-07-19

## 2022-07-19 VITALS
TEMPERATURE: 98.1 F | HEIGHT: 69 IN | WEIGHT: 135 LBS | OXYGEN SATURATION: 96 % | SYSTOLIC BLOOD PRESSURE: 100 MMHG | BODY MASS INDEX: 19.99 KG/M2 | HEART RATE: 67 BPM | DIASTOLIC BLOOD PRESSURE: 70 MMHG

## 2022-07-19 DIAGNOSIS — G35 MULTIPLE SCLEROSIS: ICD-10-CM

## 2022-07-19 DIAGNOSIS — Z87.19 PERSONAL HISTORY OF OTHER DISEASES OF THE DIGESTIVE SYSTEM: ICD-10-CM

## 2022-07-19 PROCEDURE — 99214 OFFICE O/P EST MOD 30 MIN: CPT

## 2022-07-19 NOTE — CONSULT LETTER
[Dear  ___] : Dear  [unfilled], [Consult Letter:] : I had the pleasure of evaluating your patient, [unfilled]. [( Thank you for referring [unfilled] for consultation for _____ )] : Thank you for referring [unfilled] for consultation for [unfilled] [Please see my note below.] : Please see my note below. [Consult Closing:] : Thank you very much for allowing me to participate in the care of this patient.  If you have any questions, please do not hesitate to contact me. [Sincerely,] : Sincerely, [FreeTextEntry3] : Don\par \par Sebastian White MD\par \par Gastroenterology\par [facility]\par [facility]\par \par

## 2022-07-19 NOTE — ASSESSMENT
[FreeTextEntry1] : Inadequate iron replacement vs  slow recurrent GI bleeding bleeding.  \par he most likely has inadequate iron replacement.\par \par I recommended that he take iron 3 x. day with Vitamin  C 2x / day  to increase iron absorption\par \par He will return again in 1 month for CBC , ferritin and iron levels

## 2022-07-19 NOTE — HISTORY OF PRESENT ILLNESS
[FreeTextEntry1] : He was  admitted to the hospital recently with melena.  Extensive work-up including endoscopy colonoscopy and capsule endoscopy was negative.  He was placed on iron.  Recent laboratory tests  revealed a hemoglobin of 11.6 grams and a hematocrit of 37.5 and a MCV of 64.  His recent ferritin was also 21. He feels well and ha sno complaints. He denies recurrent melena or rectal bleeding.

## 2022-07-28 ENCOUNTER — APPOINTMENT (OUTPATIENT)
Dept: NEUROLOGY | Facility: CLINIC | Age: 42
End: 2022-07-28

## 2022-07-28 PROCEDURE — S0028: CPT

## 2022-07-28 PROCEDURE — 96413 CHEMO IV INFUSION 1 HR: CPT

## 2022-07-28 PROCEDURE — 96415 CHEMO IV INFUSION ADDL HR: CPT

## 2022-08-02 ENCOUNTER — OUTPATIENT (OUTPATIENT)
Dept: OUTPATIENT SERVICES | Facility: HOSPITAL | Age: 42
LOS: 1 days | End: 2022-08-02
Payer: COMMERCIAL

## 2022-08-02 ENCOUNTER — APPOINTMENT (OUTPATIENT)
Dept: MRI IMAGING | Facility: CLINIC | Age: 42
End: 2022-08-02

## 2022-08-02 DIAGNOSIS — G35 MULTIPLE SCLEROSIS: ICD-10-CM

## 2022-08-02 DIAGNOSIS — Z00.8 ENCOUNTER FOR OTHER GENERAL EXAMINATION: ICD-10-CM

## 2022-08-02 PROCEDURE — 72157 MRI CHEST SPINE W/O & W/DYE: CPT | Mod: 26

## 2022-08-02 PROCEDURE — 70553 MRI BRAIN STEM W/O & W/DYE: CPT | Mod: 26

## 2022-08-02 PROCEDURE — 72156 MRI NECK SPINE W/O & W/DYE: CPT | Mod: 26

## 2022-08-02 PROCEDURE — A9585: CPT

## 2022-08-02 PROCEDURE — 72156 MRI NECK SPINE W/O & W/DYE: CPT

## 2022-08-02 PROCEDURE — 72157 MRI CHEST SPINE W/O & W/DYE: CPT

## 2022-08-02 PROCEDURE — 70553 MRI BRAIN STEM W/O & W/DYE: CPT

## 2022-08-02 PROCEDURE — 72156 MRI NECK SPINE W/O & W/DYE: CPT | Mod: 26,76

## 2022-08-14 ENCOUNTER — NON-APPOINTMENT (OUTPATIENT)
Age: 42
End: 2022-08-14

## 2022-08-15 ENCOUNTER — APPOINTMENT (OUTPATIENT)
Dept: NEUROLOGY | Facility: CLINIC | Age: 42
End: 2022-08-15

## 2022-08-15 VITALS
SYSTOLIC BLOOD PRESSURE: 111 MMHG | HEART RATE: 67 BPM | DIASTOLIC BLOOD PRESSURE: 66 MMHG | WEIGHT: 145 LBS | HEIGHT: 69 IN | BODY MASS INDEX: 21.48 KG/M2

## 2022-08-15 PROCEDURE — 99214 OFFICE O/P EST MOD 30 MIN: CPT

## 2022-08-15 RX ORDER — COVID-19 ANTIGEN TEST
KIT MISCELLANEOUS
Qty: 4 | Refills: 0 | Status: COMPLETED | COMMUNITY
Start: 2022-02-14

## 2022-08-15 RX ORDER — FAMOTIDINE 20 MG/1
20 TABLET, FILM COATED ORAL DAILY
Qty: 11 | Refills: 0 | Status: DISCONTINUED | COMMUNITY
Start: 2021-11-08 | End: 2022-08-15

## 2022-08-15 NOTE — HISTORY OF PRESENT ILLNESS
[FreeTextEntry1] : INTERIM HX 08/15/2022: MRI brain/C/T spine w/w/o 8/2/2022 stable lesion burden compared to 4/27/2022 with ? mild enhancement reported at C3, C4-C5 and T4 (not clearly evident on my review). Low PLT ( 133) on labs 7/15. He has a f/u with Dr. Ortiz (hematologist) 9/13. He has been stable, denies any concerns. Work is "alright, rayna busy". "No problems" with Ocrevus. \par \par INTERIM HX 05/10/2022: Repeat MRI imaging completed 4/27/2022 showed and a NEW lesion noted in right cervical hemicord at C3 with punctate contrast enhancement compared with 11/3/2021, otherwise stable MRI brain, cervical and thoracic spine with resolution of previously noted enhancing cord lesions. Again noted is large right thyroid nodule. Next Ocrevus infusion is in July. He denies any new MS symptom. He has been stable. No progression of symptoms. He drove himself to the clinic today and was by himself. \par  \par INTERIM HX 02/10/2022: In October patient reported worsening weakness of left leg and hand x 6 weeks. HE was seen by Dr. Trammell for evaluation. Repeat MRI brain and spine imaging were ordered. He had been on compliant on Tecfidera. MRI imaging completed 11/3/2021, and MRI brain was stable, MRI cervical spine showed NEW +eri lesion at C4 and MRI T spine with NEW + eri lesion at T6. Given new +eri lesions on MRI after 3 months of being on Tecfidera and given high disease burden in spine, it was planned to switch him to Ocrevus. S/p steroids Nov 2021 with some improvement in symptoms. Now s/p Ocrevus loading doses (1/7 and 1/27). He tolerated Ocrevus. He fells a lot better now. \par \par INTERIM HX 07/16/2021: MOG neg. JCV antibody indeterminate (0.22). Repeat VZV IgM antibody positive (discussed with primary, Dr. Ponce, and this is likely false positive old vzv). He started Tecfidera on 6/30/2021 and denies any side effects, no flushing or GI side effects. He has been doing PT and reports he feels better and PT have noticed good progress. \par \par INTERIM HX 06/04/2021: Spoke to Dr. Ortiz (hem/onc) and he confirmed that patient has beta thalassemia with secondary hemochromatosis. He had recommended patient get a capsule study to rule out small bowel bleed to explain significant anemia in the hospital- this is scheduled 6/9/2021. I had discussed diagnosis of MS with him and he stated no contraindications to considering any DMT as needed. \par \par Labs reviewed (6/2/2021): Quant TB neg, TSH normal, Hep B surface ab +, Hep B surface antigen core antibody, IgM negative, Lyme neg, NMO neg, ACE neg, DsDNA neg, SSA/SB neg.\par Varicella zoster IgG ab positive and IgM elevated (2.44)\par immunoglobulin panel with elevated IgM 347 (normal )\par MOG and JCV antibody pending\par \par HPI (initial visit May 21, 2021)- Timothy is a 39 yo man with a hx of brain germinoma s/p radiation/chemotherapy 2000, was admitted to [facility] 5/2/2021-5/11/2021 for worsening baseline LLE weakness. MRI brain and spine were obtained. MRI brain showed non enhancing scattered subcortical and periventricular white matter lesions and a dorsal medulla lesion. MRI spine showed scattered non enhancing short segment cord lesions in cervical and thoracic cord and also involving conus, an enhancing left cord lesion was noted at C4-C5. A spinal tap was performed and showed 2 cells, protein 30, 5 unique Oligoclonal bands with elevated IgG index, MOG and NMO in CSF neg, paraneoplastic panel neg and CSF PCR neg. Serum RICHARD, RF neg. B12 363. COVID PCR negative. UDS negative. \par \par He was treated with 5 days of IV solumedrol with improvement in symptoms on discharge from hospital. \par \par Of note, patient also has a long standing hx of hemochromatosis requiring phlebotomies. In the hospital it was noted that he had anemia (Hb 6.1), Lymphopenia (2.44) and thrombocytopenia (109) of unclear etiology and is to follow up with hematology/oncology. He was found to have low reticulocyte count and a bone marrow biopsy is planned as outpatient for possible myelodysplastic syndrome. 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  Also noted to have elevated PTT and mildy low levels of factor 8 and 9. Also found to have low ferritin and + FOBT and endoscopy and colonoscopy did not reveal any source of bleed. \par \par most recent CBC 5/19/2021:-\par wbc 9.96, hb 8.9, platelet 208, absolute lymph 1.30 k/ul\par CMP normal\par Serum iron normal. % saturation low (13)\par Ferritin normal\par \par He returns for follow up appointment and is accompanied by his two sisters. He reports the left foot drop has not significantly improved. He continues to drag his left leg and foot while he walks and scuff the foot against the ground when walking. He reports the weakness in left leg had initially started after his diagnosis of germinoma. He does not recall having a MRI of the spine at the time. He also recalls that about 4 years ago he had trouble with his vision, where whenever he would look to the left he would not be able to focus on objects. He adds that he has not been feeling well for several months and has had low energy. \par

## 2022-08-15 NOTE — ASSESSMENT
[FreeTextEntry1] : Assessment/Plan:\par Relapsing multiple sclerosis (dx'd 5/2021), with high spinal disease burden and breakthrough disease on Tecfidera 10/2021 (left hand numbness and worsening LLE weakness) w/ evidence of 2 NEW spine enhancing lesions - C4 and T6, s/p solumedrol with symptom improvement, and now Ocrevus (loading doses 1/7/2022 and 1/27/2022). \par \par He has been clinically stable from MS standpoint, no new or worsening symptoms. No new brain or spinal cord lesions noted on recent MRI scans, though questionable areas of mild persistently enhancing cord lesions noted (though not clearly evident on my review).  \par \par Relapsing Multiple sclerosis (dx'd 5/2021), on Ocrevus since 1/2022. Stable disease. \par Plan: \par 1. Diagnostic Plan/Imaging: \par [] Will plan to repeat MRI cervical and thoracic spine w/w/o  Feb 2022 , for radiological stability and new baseline on Ocrevus\par \par 2. Disease Modifying therapy plan:.\par Continue Ocrelizumab 600 mg [id number]\par Ocrevus labs every 6 months (will repeat CBC with diff)\par \par \par 3. Symptomatic therapy plan:\par () Vitamin D3 and B12 supplementations \par \par Return to clinic 6 months, or sooner if needed. \par \par Available imaging studies and/or blood work up were reviewed. A detailed chart review was completed prior to visit.\par \par The above plan was discussed with ARLIN YO in great detail. ARLIN YO verbalized understanding and agrees with plan as detailed above. Patient was provided education and counselling on current diagnosis/symptoms, diagnostic work up, treatment options and potential side effects of any prescribed therapy/therapies. He was advised to call our clinic at [phone] for any new or worsening symptoms, or with any questions or concerns. In case of acute onset of neurological symptoms or worsening presentation, patient was advised to present to nearest emergency room for further evaluation. ARLIN YO expressed understanding and all his questions/concerns were addressed.\par \par Wendy Galo MD

## 2022-08-15 NOTE — DATA REVIEWED
[de-identified] :  MRI brain/C/T spine w/w/o 8/2/2022 stable lesion burden compared to 4/27/2022 with ? mild enhancement reported at C3, C4-C5 and T4 (not clearly evident on my review)\par \par Repeat MRI imaging completed 4/27/2022 showed and a NEW lesion noted in right cervical hemicord at C3 with punctate contrast enhancement compared with 11/3/2021, otherwise stable MRI brain, cervical and thoracic spine with resolution of previously noted enhancing cord lesions. Again noted is large right thyroid nodule. \par \par MRI Brain 11/3/2021 w/w/o- stable compared to 5/2021\par MRI cervical spine 11/3/2021 w.w.o-extensive patchy disease burden. Previous seen enhancement and expansion at C5 resolved. NEW lesion at C4 with mild enhancement posteriorly. \par MRI thoracic spine 11/3/2021 w.w.o-extensive disease burden. NEW +eri lesion at left hemicord at T6/7.\par MRI brain 5/3/2021:-\par “Brain MRI: Unchanged encephalomalacia and gliosis within the genuine of the corpus callosum.\par \par Increased signal intensity along the dorsal medulla without enhancement is nonspecific in appearance. Increasing number of foci of elevated signal intensity within the subcortical and periventricular white matter. Differential diagnosis includes ischemic, infectious/inflammatory and demyelinating processes. Cervical spine MRI with contrast may be valuable for further evaluation.\par \par New small inferior left frontal meningioma.”\par \par \par MRI cervical and thoracic spine 5/5/2021:-\par “Enhancing, T2 hyperintense, and slightly expansile lesion is noted in the cord at left C4-C5.\par \par Additional T2 hyperintense but nonenhancing lesions are identified throughout the spinal cord at the dorsal aspect of the medulla, right hemicord at C1 level, left hemicord at C3, left hemicord at T1, right hemicord at T3-T4, right ventral cord at T11-T12, and right hemicord at T12.\par IMPRESSION:\par \par -Enhancing left cord lesion at C4-C5 most compatible with acute demyelination. Less likely consideration is metastasis in this patient with history of germinoma. Multiple additional nonenhancing lesions throughout the spinal cord, most compatible with chronic demyelination. Correlate with symptomatology and CSF analysis”\par \par \par MRI Lumbar spine 5/7/2021:-\par “IMPRESSION: Evidence of T2 hyperintensity in the right aspect of the cord at T11-12 as well as more inferiorly at T12-L1 level extending to the tip of the conus without associated enhancement or expansion of the cord likely chronic demyelinating pathology.”\par \par

## 2022-08-17 ENCOUNTER — TRANSCRIPTION ENCOUNTER (OUTPATIENT)
Age: 42
End: 2022-08-17

## 2022-08-17 LAB
BASOPHILS # BLD AUTO: 0.02 K/UL
BASOPHILS NFR BLD AUTO: 0.5 %
EOSINOPHIL # BLD AUTO: 0.05 K/UL
EOSINOPHIL NFR BLD AUTO: 1.2 %
HCT VFR BLD CALC: 36.7 %
HGB BLD-MCNC: 11.3 G/DL
IMM GRANULOCYTES NFR BLD AUTO: 0.5 %
LYMPHOCYTES # BLD AUTO: 1 K/UL
LYMPHOCYTES NFR BLD AUTO: 23.5 %
MAN DIFF?: NORMAL
MCHC RBC-ENTMCNC: 20.1 PG
MCHC RBC-ENTMCNC: 30.8 GM/DL
MCV RBC AUTO: 65.4 FL
MONOCYTES # BLD AUTO: 0.35 K/UL
MONOCYTES NFR BLD AUTO: 8.2 %
NEUTROPHILS # BLD AUTO: 2.81 K/UL
NEUTROPHILS NFR BLD AUTO: 66.1 %
PLATELET # BLD AUTO: 124 K/UL
RBC # BLD: 5.61 M/UL
RBC # FLD: 18.8 %
WBC # FLD AUTO: 4.25 K/UL

## 2022-08-23 ENCOUNTER — LABORATORY RESULT (OUTPATIENT)
Age: 42
End: 2022-08-23

## 2022-08-23 ENCOUNTER — APPOINTMENT (OUTPATIENT)
Dept: GASTROENTEROLOGY | Facility: CLINIC | Age: 42
End: 2022-08-23

## 2022-08-23 VITALS
BODY MASS INDEX: 20.29 KG/M2 | HEIGHT: 69 IN | WEIGHT: 137 LBS | SYSTOLIC BLOOD PRESSURE: 110 MMHG | DIASTOLIC BLOOD PRESSURE: 70 MMHG

## 2022-08-23 DIAGNOSIS — D50.9 IRON DEFICIENCY ANEMIA, UNSPECIFIED: ICD-10-CM

## 2022-08-23 PROCEDURE — 99214 OFFICE O/P EST MOD 30 MIN: CPT | Mod: 25

## 2022-08-23 PROCEDURE — 36415 COLL VENOUS BLD VENIPUNCTURE: CPT

## 2022-08-23 NOTE — HISTORY OF PRESENT ILLNESS
[FreeTextEntry1] : He recently had a major GI bleed  whose work-up including a  colonoscopy endoscopy and capsule endoscopy  was negative.  On his last visit ,he was feeling weak and his hemoglobin as well as his iron levels were still low, so I increased his iron to i 3x/ day. He is feeling fine now and no longer feels weak.

## 2022-09-05 LAB
ALBUMIN SERPL ELPH-MCNC: 4.8 G/DL
ALP BLD-CCNC: 56 U/L
ALT SERPL-CCNC: 18 U/L
ANION GAP SERPL CALC-SCNC: 11 MMOL/L
AST SERPL-CCNC: 15 U/L
BASOPHILS # BLD AUTO: 0.02 K/UL
BASOPHILS NFR BLD AUTO: 0.5 %
BILIRUB SERPL-MCNC: 1.8 MG/DL
BUN SERPL-MCNC: 18 MG/DL
CALCIUM SERPL-MCNC: 9.7 MG/DL
CHLORIDE SERPL-SCNC: 106 MMOL/L
CO2 SERPL-SCNC: 25 MMOL/L
CREAT SERPL-MCNC: 0.97 MG/DL
EGFR: 99 ML/MIN/1.73M2
EOSINOPHIL # BLD AUTO: 0.06 K/UL
EOSINOPHIL NFR BLD AUTO: 1.4 %
FERRITIN SERPL-MCNC: 46 NG/ML
GLUCOSE SERPL-MCNC: 104 MG/DL
HCT VFR BLD CALC: 39 %
HGB BLD-MCNC: 11.6 G/DL
IMM GRANULOCYTES NFR BLD AUTO: 0.2 %
IRON SATN MFR SERPL: 63 %
IRON SERPL-MCNC: 184 UG/DL
LYMPHOCYTES # BLD AUTO: 0.88 K/UL
LYMPHOCYTES NFR BLD AUTO: 20.6 %
MAN DIFF?: NORMAL
MCHC RBC-ENTMCNC: 20.2 PG
MCHC RBC-ENTMCNC: 29.7 GM/DL
MCV RBC AUTO: 67.9 FL
MONOCYTES # BLD AUTO: 0.36 K/UL
MONOCYTES NFR BLD AUTO: 8.4 %
NEUTROPHILS # BLD AUTO: 2.94 K/UL
NEUTROPHILS NFR BLD AUTO: 68.9 %
PLATELET # BLD AUTO: 141 K/UL
POTASSIUM SERPL-SCNC: 4.4 MMOL/L
PROT SERPL-MCNC: 6.5 G/DL
RBC # BLD: 5.74 M/UL
RBC # FLD: 19.6 %
SODIUM SERPL-SCNC: 142 MMOL/L
TIBC SERPL-MCNC: 293 UG/DL
UIBC SERPL-MCNC: 109 UG/DL
WBC # FLD AUTO: 4.27 K/UL

## 2022-09-17 ENCOUNTER — NON-APPOINTMENT (OUTPATIENT)
Age: 42
End: 2022-09-17

## 2022-12-14 NOTE — REVIEW OF SYSTEMS
Anesthesia Post Evaluation    Patient: Breanne Cantu    Procedure(s) Performed: Procedure(s) (LRB):  COLONOSCOPY (N/A)    Final Anesthesia Type: MAC      Patient location during evaluation: PACU  Patient participation: Yes- Able to Participate  Level of consciousness: awake and alert  Post-procedure vital signs: reviewed and stable  Pain management: adequate  Airway patency: patent    PONV status at discharge: No PONV  Anesthetic complications: no      Cardiovascular status: blood pressure returned to baseline  Respiratory status: unassisted  Hydration status: euvolemic  Follow-up not needed.          Vitals Value Taken Time   /66 12/14/22 0745   Temp 98 12/14/22 0745   Pulse 66 12/14/22 0745   Resp 12 12/14/22 0745   SpO2 98 12/14/22 0745         No case tracking events are documented in the log.      Pain/Macho Score: No data recorded       [Negative] : Heme/Lymph [FreeTextEntry9] : weakness

## 2023-01-24 ENCOUNTER — NON-APPOINTMENT (OUTPATIENT)
Age: 43
End: 2023-01-24

## 2023-01-25 ENCOUNTER — APPOINTMENT (OUTPATIENT)
Dept: NEUROLOGY | Facility: CLINIC | Age: 43
End: 2023-01-25
Payer: COMMERCIAL

## 2023-01-25 PROCEDURE — 96413 CHEMO IV INFUSION 1 HR: CPT

## 2023-01-25 PROCEDURE — S0028: CPT

## 2023-01-25 PROCEDURE — 96415 CHEMO IV INFUSION ADDL HR: CPT

## 2023-02-07 ENCOUNTER — APPOINTMENT (OUTPATIENT)
Dept: NEUROLOGY | Facility: CLINIC | Age: 43
End: 2023-02-07
Payer: COMMERCIAL

## 2023-02-07 VITALS
DIASTOLIC BLOOD PRESSURE: 69 MMHG | HEIGHT: 69 IN | SYSTOLIC BLOOD PRESSURE: 117 MMHG | HEART RATE: 64 BPM | BODY MASS INDEX: 20.29 KG/M2 | WEIGHT: 137 LBS

## 2023-02-07 PROCEDURE — 99214 OFFICE O/P EST MOD 30 MIN: CPT

## 2023-02-07 NOTE — HISTORY OF PRESENT ILLNESS
[FreeTextEntry1] : INTERIM HX 02/07/2023: Last Ocrevus infusion 1/25/2023. No issues. No new symptoms. No GI bleeding. Still taking iron tablets. Sees hematologist every 6 months. No issues with bowel or bladder function. No falls. \par \par INTERIM HX 08/15/2022: MRI brain/C/T spine w/w/o 8/2/2022 stable lesion burden compared to 4/27/2022 with ? mild enhancement reported at C3, C4-C5 and T4 (not clearly evident on my review). Low PLT ( 133) on labs 7/15. He has a f/u with Dr. Ortiz (hematologist) 9/13. He has been stable, denies any concerns. Work is "alright, rayna busy". "No problems" with Ocrevus. \par \par INTERIM HX 05/10/2022: Repeat MRI imaging completed 4/27/2022 showed and a NEW lesion noted in right cervical hemicord at C3 with punctate contrast enhancement compared with 11/3/2021, otherwise stable MRI brain, cervical and thoracic spine with resolution of previously noted enhancing cord lesions. Again noted is large right thyroid nodule. Next Ocrevus infusion is in July. He denies any new MS symptom. He has been stable. No progression of symptoms. He drove himself to the clinic today and was by himself. \par  \par INTERIM HX 02/10/2022: In October patient reported worsening weakness of left leg and hand x 6 weeks. HE was seen by Dr. Trammell for evaluation. Repeat MRI brain and spine imaging were ordered. He had been on compliant on Tecfidera. MRI imaging completed 11/3/2021, and MRI brain was stable, MRI cervical spine showed NEW +eri lesion at C4 and MRI T spine with NEW + eri lesion at T6. Given new +eri lesions on MRI after 3 months of being on Tecfidera and given high disease burden in spine, it was planned to switch him to Ocrevus. S/p steroids Nov 2021 with some improvement in symptoms. Now s/p Ocrevus loading doses (1/7 and 1/27). He tolerated Ocrevus. He fells a lot better now. \par \par INTERIM HX 07/16/2021: MOG neg. JCV antibody indeterminate (0.22). Repeat VZV IgM antibody positive (discussed with primary, Dr. Ponce, and this is likely false positive old vzv). He started Tecfidera on 6/30/2021 and denies any side effects, no flushing or GI side effects. He has been doing PT and reports he feels better and PT have noticed good progress. \par \par INTERIM HX 06/04/2021: Spoke to Dr. Ortiz (hem/onc) and he confirmed that patient has beta thalassemia with secondary hemochromatosis. He had recommended patient get a capsule study to rule out small bowel bleed to explain significant anemia in the hospital- this is scheduled 6/9/2021. I had discussed diagnosis of MS with him and he stated no contraindications to considering any DMT as needed. \par \par Labs reviewed (6/2/2021): Quant TB neg, TSH normal, Hep B surface ab +, Hep B surface antigen core antibody, IgM negative, Lyme neg, NMO neg, ACE neg, DsDNA neg, SSA/SB neg.\par Varicella zoster IgG ab positive and IgM elevated (2.44)\par immunoglobulin panel with elevated IgM 347 (normal )\par MOG and JCV antibody pending\par \par HPI (initial visit May 21, 2021)- Timothy is a 39 yo man with a hx of brain germinoma s/p radiation/chemotherapy 2000, was admitted to [facility] 5/2/2021-5/11/2021 for worsening baseline LLE weakness. MRI brain and spine were obtained. MRI brain showed non enhancing scattered subcortical and periventricular white matter lesions and a dorsal medulla lesion. MRI spine showed scattered non enhancing short segment cord lesions in cervical and thoracic cord and also involving conus, an enhancing left cord lesion was noted at C4-C5. A spinal tap was performed and showed 2 cells, protein 30, 5 unique Oligoclonal bands with elevated IgG index, MOG and NMO in CSF neg, paraneoplastic panel neg and CSF PCR neg. Serum RICHARD, RF neg. B12 363. COVID PCR negative. UDS negative. \par \par He was treated with 5 days of IV solumedrol with improvement in symptoms on discharge from hospital. \par \par Of note, patient also has a long standing hx of hemochromatosis requiring phlebotomies. In the hospital it was noted that he had anemia (Hb 6.1), Lymphopenia (2.44) and thrombocytopenia (109) of unclear etiology and is to follow up with hematology/oncology. He was found to have low reticulocyte count and a bone marrow biopsy is planned as outpatient for possible myelodysplastic syndrome. 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  Also noted to have elevated PTT and mildy low levels of factor 8 and 9. Also found to have low ferritin and + FOBT and endoscopy and colonoscopy did not reveal any source of bleed. \par \par most recent CBC 5/19/2021:-\par wbc 9.96, hb 8.9, platelet 208, absolute lymph 1.30 k/ul\par CMP normal\par Serum iron normal. % saturation low (13)\par Ferritin normal\par \par He returns for follow up appointment and is accompanied by his two sisters. He reports the left foot drop has not significantly improved. He continues to drag his left leg and foot while he walks and scuff the foot against the ground when walking. He reports the weakness in left leg had initially started after his diagnosis of germinoma. He does not recall having a MRI of the spine at the time. He also recalls that about 4 years ago he had trouble with his vision, where whenever he would look to the left he would not be able to focus on objects. He adds that he has not been feeling well for several months and has had low energy. \par

## 2023-02-07 NOTE — PHYSICAL EXAM
[FreeTextEntry1] : PHYSICAL EXAM\par Constitutional: Alert, no acute distress \par Psychiatric: appropriate affect and mood\par Pulmonary: No respiratory distress, stable on room air\par \par NEUROLOGICAL EXAM\par Mental status: The patient is alert, attentive, and conversational memory intact\par Speech/language: No dysarthria\par Cranial nerves:\par CN II: Visual fields are full to confrontation. Pupil size equal and briskly reactive to light. No RAPD. No red color desaturation\par CN III, IV, VI: ? mild right LISA, no ptosis\par CN V: Facial sensation is intact to pinprick in all 3 divisions bilaterally.\par CN VII: Face is symmetric with normal eye closure and smile.\par CN VII: Hearing is normal to rubbing fingers\par CN IX, X: Palate elevates symmetrically. Phonation is normal.\par CN XI: Head turning and shoulder shrug are intact\par CN XII: Tongue is midline with normal movements and no atrophy.\par Motor:  5/5 b/l UE, 5/5 RLE, 5/5 proximal LLE and distally dorsiflexion 4+/5.\par Reflexes: more brisk on the left compared to the right with 2 beat clonus on the left. NO Babinski. No Thrasher\par Sensory: Intact to light touch and pinprick in all extremities. Joint proprioception  intact in toes bilaterally. Vibration 8 seconds at right big toe and 15 seconds at right knee, 6 seconds at left big toe and 10 seconds at left knee.\par Coordination: Slight dysmetria on finger to nose on the left and heel to shin on left. \par Gait/Stance: High steppage gait on left with frequent catching of left toes on the ground. Left leg circumduction.\par \par \par \par \par

## 2023-02-07 NOTE — DATA REVIEWED
[de-identified] :  MRI brain/C/T spine w/w/o 8/2/2022 stable lesion burden compared to 4/27/2022 with ? mild enhancement reported at C3, C4-C5 and T4 (not clearly evident on my review)\par \par Repeat MRI imaging completed 4/27/2022 showed and a NEW lesion noted in right cervical hemicord at C3 with punctate contrast enhancement compared with 11/3/2021, otherwise stable MRI brain, cervical and thoracic spine with resolution of previously noted enhancing cord lesions. Again noted is large right thyroid nodule. \par \par MRI Brain 11/3/2021 w/w/o- stable compared to 5/2021\par MRI cervical spine 11/3/2021 w.w.o-extensive patchy disease burden. Previous seen enhancement and expansion at C5 resolved. NEW lesion at C4 with mild enhancement posteriorly. \par MRI thoracic spine 11/3/2021 w.w.o-extensive disease burden. NEW +eri lesion at left hemicord at T6/7.\par MRI brain 5/3/2021:-\par “Brain MRI: Unchanged encephalomalacia and gliosis within the genuine of the corpus callosum.\par \par Increased signal intensity along the dorsal medulla without enhancement is nonspecific in appearance. Increasing number of foci of elevated signal intensity within the subcortical and periventricular white matter. Differential diagnosis includes ischemic, infectious/inflammatory and demyelinating processes. Cervical spine MRI with contrast may be valuable for further evaluation.\par \par New small inferior left frontal meningioma.”\par \par \par MRI cervical and thoracic spine 5/5/2021:-\par “Enhancing, T2 hyperintense, and slightly expansile lesion is noted in the cord at left C4-C5.\par \par Additional T2 hyperintense but nonenhancing lesions are identified throughout the spinal cord at the dorsal aspect of the medulla, right hemicord at C1 level, left hemicord at C3, left hemicord at T1, right hemicord at T3-T4, right ventral cord at T11-T12, and right hemicord at T12.\par IMPRESSION:\par \par -Enhancing left cord lesion at C4-C5 most compatible with acute demyelination. Less likely consideration is metastasis in this patient with history of germinoma. Multiple additional nonenhancing lesions throughout the spinal cord, most compatible with chronic demyelination. Correlate with symptomatology and CSF analysis”\par \par \par MRI Lumbar spine 5/7/2021:-\par “IMPRESSION: Evidence of T2 hyperintensity in the right aspect of the cord at T11-12 as well as more inferiorly at T12-L1 level extending to the tip of the conus without associated enhancement or expansion of the cord likely chronic demyelinating pathology.”\par \par

## 2023-02-07 NOTE — ASSESSMENT
[FreeTextEntry1] : Assessment/Plan:\par Relapsing multiple sclerosis (dx'd 5/2021), with high spinal disease burden and breakthrough disease on Tecfidera 10/2021 (left hand numbness and worsening LLE weakness) w/ evidence of 2 NEW spine enhancing lesions - C4 and T6, s/p solumedrol with symptom improvement, and now Ocrevus (loading doses 1/7/2022 and 1/27/2022). \par \par He has been clinically stable from MS standpoint, no new or worsening symptoms. No new brain or spinal cord lesions noted on recent MRI scans, though questionable areas of mild persistently enhancing cord lesions noted (though not clearly evident on my review).  \par \par Relapsing Multiple sclerosis (dx'd 5/2021), on Ocrevus since 1/2022. Stable disease. \par Plan: \par 1. Diagnostic Plan/Imaging: \par [] Will plan to repeat MRI cervical and thoracic spine w/w/o contrast Feb 2023 , for radiological stability and new baseline on Ocrevus\par \par 2. Disease Modifying therapy plan:.\par Continue Ocrelizumab 600 mg [id number]\par Ocrevus labs every 6 months- labs ordered\par \par \par 3. Symptomatic therapy plan:\par () Vitamin D3 and B12 supplementations \par \par Return to clinic 6 months, or sooner if needed. \par \par \par The above plan was discussed with ARLIN YO in great detail. ARLIN YO verbalized understanding and agrees with plan as detailed above. Patient was provided education and counselling on current diagnosis/symptoms, diagnostic work up, treatment options and potential side effects of any prescribed therapy/therapies. He was advised to call our clinic at [phone] for any new or worsening symptoms, or with any questions or concerns. In case of acute onset of neurological symptoms or worsening presentation, patient was advised to present to nearest emergency room for further evaluation. ARLIN YO expressed understanding and all his questions/concerns were addressed.\par \par Wendy Galo MD

## 2023-03-07 ENCOUNTER — APPOINTMENT (OUTPATIENT)
Dept: MRI IMAGING | Facility: CLINIC | Age: 43
End: 2023-03-07

## 2023-03-07 ENCOUNTER — OUTPATIENT (OUTPATIENT)
Dept: OUTPATIENT SERVICES | Facility: HOSPITAL | Age: 43
LOS: 1 days | End: 2023-03-07
Payer: COMMERCIAL

## 2023-03-07 ENCOUNTER — RESULT REVIEW (OUTPATIENT)
Age: 43
End: 2023-03-07

## 2023-03-07 DIAGNOSIS — G35 MULTIPLE SCLEROSIS: ICD-10-CM

## 2023-03-07 PROCEDURE — 72156 MRI NECK SPINE W/O & W/DYE: CPT

## 2023-03-07 PROCEDURE — 72157 MRI CHEST SPINE W/O & W/DYE: CPT | Mod: 26

## 2023-03-07 PROCEDURE — 72156 MRI NECK SPINE W/O & W/DYE: CPT | Mod: 26

## 2023-03-07 PROCEDURE — A9585: CPT

## 2023-03-07 PROCEDURE — 72157 MRI CHEST SPINE W/O & W/DYE: CPT

## 2023-03-10 ENCOUNTER — TRANSCRIPTION ENCOUNTER (OUTPATIENT)
Age: 43
End: 2023-03-10

## 2023-04-03 NOTE — DIETITIAN INITIAL EVALUATION ADULT. - CALCULATED TO (G/KG)
81.36 High Dose Vitamin A Pregnancy And Lactation Text: High dose vitamin A therapy is contraindicated during pregnancy and breast feeding.

## 2023-06-26 ENCOUNTER — RX RENEWAL (OUTPATIENT)
Age: 43
End: 2023-06-26

## 2023-07-19 ENCOUNTER — APPOINTMENT (OUTPATIENT)
Dept: NEUROLOGY | Facility: CLINIC | Age: 43
End: 2023-07-19
Payer: COMMERCIAL

## 2023-07-19 PROCEDURE — 96415 CHEMO IV INFUSION ADDL HR: CPT

## 2023-07-19 PROCEDURE — 96413 CHEMO IV INFUSION 1 HR: CPT

## 2023-08-31 ENCOUNTER — APPOINTMENT (OUTPATIENT)
Dept: NEUROLOGY | Facility: CLINIC | Age: 43
End: 2023-08-31
Payer: COMMERCIAL

## 2023-08-31 VITALS
HEIGHT: 69 IN | HEART RATE: 66 BPM | DIASTOLIC BLOOD PRESSURE: 77 MMHG | SYSTOLIC BLOOD PRESSURE: 115 MMHG | WEIGHT: 137 LBS | BODY MASS INDEX: 20.29 KG/M2

## 2023-08-31 DIAGNOSIS — G35 MULTIPLE SCLEROSIS: ICD-10-CM

## 2023-08-31 PROCEDURE — 99214 OFFICE O/P EST MOD 30 MIN: CPT

## 2023-08-31 NOTE — DATA REVIEWED
[de-identified] : MRI C and T spine w/w/o 3/7/2023- stable, no enhancement.   MRI brain/C/T spine w/w/o 8/2/2022 stable lesion burden compared to 4/27/2022 with ? mild enhancement reported at C3, C4-C5 and T4 (not clearly evident on my review)  Repeat MRI imaging completed 4/27/2022 showed and a NEW lesion noted in right cervical hemicord at C3 with punctate contrast enhancement compared with 11/3/2021, otherwise stable MRI brain, cervical and thoracic spine with resolution of previously noted enhancing cord lesions. Again noted is large right thyroid nodule.   MRI Brain 11/3/2021 w/w/o- stable compared to 5/2021 MRI cervical spine 11/3/2021 w.w.o-extensive patchy disease burden. Previous seen enhancement and expansion at C5 resolved. NEW lesion at C4 with mild enhancement posteriorly.  MRI thoracic spine 11/3/2021 w.w.o-extensive disease burden. NEW +eri lesion at left hemicord at T6/7. MRI brain 5/3/2021:- "Brain MRI: Unchanged encephalomalacia and gliosis within the genuine of the corpus callosum.  Increased signal intensity along the dorsal medulla without enhancement is nonspecific in appearance. Increasing number of foci of elevated signal intensity within the subcortical and periventricular white matter. Differential diagnosis includes ischemic, infectious/inflammatory and demyelinating processes. Cervical spine MRI with contrast may be valuable for further evaluation.  New small inferior left frontal meningioma."   MRI cervical and thoracic spine 5/5/2021:- "Enhancing, T2 hyperintense, and slightly expansile lesion is noted in the cord at left C4-C5.  Additional T2 hyperintense but nonenhancing lesions are identified throughout the spinal cord at the dorsal aspect of the medulla, right hemicord at C1 level, left hemicord at C3, left hemicord at T1, right hemicord at T3-T4, right ventral cord at T11-T12, and right hemicord at T12. IMPRESSION:  -Enhancing left cord lesion at C4-C5 most compatible with acute demyelination. Less likely consideration is metastasis in this patient with history of germinoma. Multiple additional nonenhancing lesions throughout the spinal cord, most compatible with chronic demyelination. Correlate with symptomatology and CSF analysis"   MRI Lumbar spine 5/7/2021:- "IMPRESSION: Evidence of T2 hyperintensity in the right aspect of the cord at T11-12 as well as more inferiorly at T12-L1 level extending to the tip of the conus without associated enhancement or expansion of the cord likely chronic demyelinating pathology."

## 2023-08-31 NOTE — PHYSICAL EXAM
[FreeTextEntry1] : PHYSICAL EXAM Constitutional: Alert, no acute distress  Psychiatric: appropriate affect and mood Pulmonary: No respiratory distress, stable on room air  NEUROLOGICAL EXAM Mental status: The patient is alert, attentive, and conversational memory intact. Speech/language: No dysarthria Cranial nerves: CN II: Visual fields are full to confrontation. Pupil size equal and briskly reactive to light. No RAPD. No red color desaturation CN III, IV, VI: ? Mild RIGHT LISA, no ptosis CN V: Facial sensation is intact to pinprick in all 3 divisions bilaterally. CN VII: Face is symmetric with normal eye closure and smile. CN VII: Hearing is normal to rubbing fingers CN IX, X: Palate elevates symmetrically. Phonation is normal. CN XI: Head turning and shoulder shrug are intact CN XII: Tongue is midline with normal movements and no atrophy. Motor:  5/5 b/l UE, 5/5 RLE, 5/5 proximal LLE and distally dorsiflexion 4+/5. Reflexes: more brisk on the left compared to the right with 2 beat clonus on the left. NO Babinski. No Thrasher Sensory: Intact to light touch and pinprick in all extremities. Joint proprioception  intact in toes bilaterally. Vibration 8 seconds at right big toe and 15 seconds at right knee, 6 seconds at left big toe and 10 seconds at left knee. Coordination: Slight dysmetria on finger to nose on the left Gait/Stance: high steppage gait on left with frequent catching of left toes on the ground. Left leg circumduction.

## 2023-08-31 NOTE — HISTORY OF PRESENT ILLNESS
[FreeTextEntry1] : INTERIM HX 08/31/2023: last Ocrevus 7/19. MRI C and T spine w/w/o 3/7/2023- stable, no enhancement. He remains stable, no concerns.   INTERIM HX 02/07/2023: Last Ocrevus infusion 1/25/2023. No issues. No new symptoms. No GI bleeding. Still taking iron tablets. Sees hematologist every 6 months. No issues with bowel or bladder function. No falls.   INTERIM HX 08/15/2022: MRI brain/C/T spine w/w/o 8/2/2022 stable lesion burden compared to 4/27/2022 with ? mild enhancement reported at C3, C4-C5 and T4 (not clearly evident on my review). Low PLT ( 133) on labs 7/15. He has a f/u with Dr. Ortiz (hematologist) 9/13. He has been stable, denies any concerns. Work is "alright, rayna busy". "No problems" with Ocrevus.   INTERIM HX 05/10/2022: Repeat MRI imaging completed 4/27/2022 showed and a NEW lesion noted in right cervical hemicord at C3 with punctate contrast enhancement compared with 11/3/2021, otherwise stable MRI brain, cervical and thoracic spine with resolution of previously noted enhancing cord lesions. Again noted is large right thyroid nodule. Next Ocrevus infusion is in July. He denies any new MS symptom. He has been stable. No progression of symptoms. He drove himself to the clinic today and was by himself.    INTERIM HX 02/10/2022: In October patient reported worsening weakness of left leg and hand x 6 weeks. HE was seen by Dr. Trammell for evaluation. Repeat MRI brain and spine imaging were ordered. He had been on compliant on Tecfidera. MRI imaging completed 11/3/2021, and MRI brain was stable, MRI cervical spine showed NEW +eri lesion at C4 and MRI T spine with NEW + eri lesion at T6. Given new +eri lesions on MRI after 3 months of being on Tecfidera and given high disease burden in spine, it was planned to switch him to Ocrevus. S/p steroids Nov 2021 with some improvement in symptoms. Now s/p Ocrevus loading doses (1/7 and 1/27). He tolerated Ocrevus. He fells a lot better now.   INTERIM HX 07/16/2021: MOG neg. JCV antibody indeterminate (0.22). Repeat VZV IgM antibody positive (discussed with primary, Dr. Ponce, and this is likely false positive old vzv). He started Tecfidera on 6/30/2021 and denies any side effects, no flushing or GI side effects. He has been doing PT and reports he feels better and PT have noticed good progress.   INTERIM HX 06/04/2021: Spoke to Dr. Ortiz (hem/onc) and he confirmed that patient has beta thalassemia with secondary hemochromatosis. He had recommended patient get a capsule study to rule out small bowel bleed to explain significant anemia in the hospital- this is scheduled 6/9/2021. I had discussed diagnosis of MS with him and he stated no contraindications to considering any DMT as needed.   Labs reviewed (6/2/2021): Quant TB neg, TSH normal, Hep B surface ab +, Hep B surface antigen core antibody, IgM negative, Lyme neg, NMO neg, ACE neg, DsDNA neg, SSA/SB neg. Varicella zoster IgG ab positive and IgM elevated (2.44) immunoglobulin panel with elevated IgM 347 (normal ) MOG and JCV antibody pending  HPI (initial visit May 21, 2021)- Timothy is a 39 yo man with a hx of brain germinoma s/p radiation/chemotherapy 2000, was admitted to [facility] 5/2/2021-5/11/2021 for worsening baseline LLE weakness. MRI brain and spine were obtained. MRI brain showed non enhancing scattered subcortical and periventricular white matter lesions and a dorsal medulla lesion. MRI spine showed scattered non enhancing short segment cord lesions in cervical and thoracic cord and also involving conus, an enhancing left cord lesion was noted at C4-C5. A spinal tap was performed and showed 2 cells, protein 30, 5 unique Oligoclonal bands with elevated IgG index, MOG and NMO in CSF neg, paraneoplastic panel neg and CSF PCR neg. Serum RICHARD, RF neg. B12 363. COVID PCR negative. UDS negative.   He was treated with 5 days of IV solumedrol with improvement in symptoms on discharge from hospital.   Of note, patient also has a long standing hx of hemochromatosis requiring phlebotomies. In the hospital it was noted that he had anemia (Hb 6.1), Lymphopenia (2.44) and thrombocytopenia (109) of unclear etiology and is to follow up with hematology/oncology. He was found to have low reticulocyte count and a bone marrow biopsy is planned as outpatient for possible myelodysplastic syndrome. 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  Also noted to have elevated PTT and mildy low levels of factor 8 and 9. Also found to have low ferritin and + FOBT and endoscopy and colonoscopy did not reveal any source of bleed.   most recent CBC 5/19/2021:- wbc 9.96, hb 8.9, platelet 208, absolute lymph 1.30 k/ul CMP normal Serum iron normal. % saturation low (13) Ferritin normal  He returns for follow up appointment and is accompanied by his two sisters. He reports the left foot drop has not significantly improved. He continues to drag his left leg and foot while he walks and scuff the foot against the ground when walking. He reports the weakness in left leg had initially started after his diagnosis of germinoma. He does not recall having a MRI of the spine at the time. He also recalls that about 4 years ago he had trouble with his vision, where whenever he would look to the left he would not be able to focus on objects. He adds that he has not been feeling well for several months and has had low energy.

## 2023-09-05 ENCOUNTER — TRANSCRIPTION ENCOUNTER (OUTPATIENT)
Age: 43
End: 2023-09-05

## 2023-09-05 ENCOUNTER — LABORATORY RESULT (OUTPATIENT)
Age: 43
End: 2023-09-05

## 2023-09-05 LAB
25(OH)D3 SERPL-MCNC: 25.3 NG/ML
ALBUMIN SERPL ELPH-MCNC: 5.1 G/DL
ALP BLD-CCNC: 77 U/L
ALT SERPL-CCNC: 20 U/L
ANION GAP SERPL CALC-SCNC: 10 MMOL/L
AST SERPL-CCNC: 15 U/L
BILIRUB SERPL-MCNC: 1.6 MG/DL
BUN SERPL-MCNC: 16 MG/DL
CALCIUM SERPL-MCNC: 10.1 MG/DL
CHLORIDE SERPL-SCNC: 103 MMOL/L
CO2 SERPL-SCNC: 27 MMOL/L
CREAT SERPL-MCNC: 1.14 MG/DL
EGFR: 81 ML/MIN/1.73M2
GLUCOSE SERPL-MCNC: 94 MG/DL
POTASSIUM SERPL-SCNC: 4.9 MMOL/L
PROT SERPL-MCNC: 6.8 G/DL
SODIUM SERPL-SCNC: 141 MMOL/L

## 2023-09-06 LAB
BASOPHILS # BLD AUTO: 0 K/UL
BASOPHILS NFR BLD AUTO: 0 %
EOSINOPHIL # BLD AUTO: 0.26 K/UL
EOSINOPHIL NFR BLD AUTO: 4.4 %
HCT VFR BLD CALC: 41.2 %
HGB BLD-MCNC: 13.1 G/DL
LYMPHOCYTES # BLD AUTO: 1.27 K/UL
LYMPHOCYTES NFR BLD AUTO: 21.7 %
MAN DIFF?: NORMAL
MCHC RBC-ENTMCNC: 19.9 PG
MCHC RBC-ENTMCNC: 31.8 GM/DL
MCV RBC AUTO: 62.7 FL
MONOCYTES # BLD AUTO: 0.25 K/UL
MONOCYTES NFR BLD AUTO: 4.3 %
NEUTROPHILS # BLD AUTO: 4.09 K/UL
NEUTROPHILS NFR BLD AUTO: 69.6 %
PLATELET # BLD AUTO: 150 K/UL
RBC # BLD: 6.57 M/UL
RBC # FLD: 18.3 %
WBC # FLD AUTO: 5.87 K/UL

## 2023-09-07 ENCOUNTER — TRANSCRIPTION ENCOUNTER (OUTPATIENT)
Age: 43
End: 2023-09-07

## 2023-09-21 NOTE — PROGRESS NOTE ADULT - PROBLEM SELECTOR PLAN 5
- PTT prolonged to 43 on admission (INR and PT wnl).   - etiology: not on heparin. not on anticoagulants. possible von willebrand, hemophilia. not concerning for DIC. less likely cirrhosis given INR wnl, thrombycytopenia since yesterday, albumin wnl.   plan  - f/u factor 8.   - liver imaging. f/u heme/onc recs PA/NP to bill Attending to bill - hx hemochromatosis requiring monthly phlebotomies (confirmed with PCP Dr. Ponce) - last phlebotomy 3 months ago  plan  - currently not in iron overload in setting of anemia above. CTM ferritin with blood transfusions.  - f/u testosterone lvl  - f/u genetic testing, out-patient MRI of liver to evaluate for iron deposition Dapsone Pregnancy And Lactation Text: This medication is Pregnancy Category C and is not considered safe during pregnancy or breast feeding.

## 2024-01-17 ENCOUNTER — APPOINTMENT (OUTPATIENT)
Dept: NEUROLOGY | Facility: CLINIC | Age: 44
End: 2024-01-17

## 2024-02-06 ENCOUNTER — APPOINTMENT (OUTPATIENT)
Dept: NEUROLOGY | Facility: CLINIC | Age: 44
End: 2024-02-06

## 2024-02-08 ENCOUNTER — TRANSCRIPTION ENCOUNTER (OUTPATIENT)
Age: 44
End: 2024-02-08

## 2024-02-15 ENCOUNTER — TRANSCRIPTION ENCOUNTER (OUTPATIENT)
Age: 44
End: 2024-02-15

## 2024-02-18 ENCOUNTER — NON-APPOINTMENT (OUTPATIENT)
Age: 44
End: 2024-02-18

## 2024-02-20 ENCOUNTER — APPOINTMENT (OUTPATIENT)
Dept: NEUROLOGY | Facility: CLINIC | Age: 44
End: 2024-02-20

## 2024-02-20 VITALS — HEART RATE: 58 BPM | SYSTOLIC BLOOD PRESSURE: 97 MMHG | DIASTOLIC BLOOD PRESSURE: 67 MMHG | RESPIRATION RATE: 18 BRPM

## 2024-02-20 VITALS — SYSTOLIC BLOOD PRESSURE: 100 MMHG | DIASTOLIC BLOOD PRESSURE: 69 MMHG | RESPIRATION RATE: 18 BRPM | HEART RATE: 55 BPM

## 2024-02-20 VITALS — RESPIRATION RATE: 18 BRPM | DIASTOLIC BLOOD PRESSURE: 63 MMHG | HEART RATE: 61 BPM | SYSTOLIC BLOOD PRESSURE: 103 MMHG

## 2024-02-20 VITALS — SYSTOLIC BLOOD PRESSURE: 100 MMHG | DIASTOLIC BLOOD PRESSURE: 67 MMHG | RESPIRATION RATE: 18 BRPM | HEART RATE: 54 BPM

## 2024-02-20 VITALS — DIASTOLIC BLOOD PRESSURE: 67 MMHG | RESPIRATION RATE: 18 BRPM | HEART RATE: 55 BPM | SYSTOLIC BLOOD PRESSURE: 98 MMHG

## 2024-02-20 VITALS — HEART RATE: 58 BPM | RESPIRATION RATE: 18 BRPM | SYSTOLIC BLOOD PRESSURE: 107 MMHG | DIASTOLIC BLOOD PRESSURE: 67 MMHG

## 2024-02-20 VITALS — DIASTOLIC BLOOD PRESSURE: 67 MMHG | SYSTOLIC BLOOD PRESSURE: 99 MMHG | RESPIRATION RATE: 18 BRPM | HEART RATE: 80 BPM

## 2024-02-20 NOTE — HISTORY OF PRESENT ILLNESS
[N/A] : N/A [Denies] : Denies [No] : No [Yes] : Yes [22g] : 22g [Start Time: ___] : Medication Start Time: [unfilled] [End Time: ___] : Medication End Time: [unfilled] [Medication Name: ___] : Medication Name: [unfilled] [Total Amount Administered: ___] : Total Amount Administered: [unfilled] [IV discontinued. Intact. No signs or symptoms of IV complications noted. Time: ___] : IV discontinued. Intact. No signs or symptoms of IV complications noted. Time: [unfilled] [Patient  instructed to seek medical attention with signs and symptoms of adverse effects] : Patient  instructed to seek medical attention with signs and symptoms of adverse effects [Patient left unit in no acute distress] : Patient left unit in no acute distress [Medications administered as ordered and tolerated well.] : Medications administered as ordered and tolerated well. [de-identified] : R hand [de-identified] : Generic: ocrelizumab   Dose: 600 mg   Infusion rate:                       40_ml/hr for  30 _mins                       80_ml/hr for  30_mins                       120_ml/hr for  30_mins                       160_ml/hr for 30_ mins                        200_ml/hr for duration of medication   NDC#: 19473-362-05   Lot#:    Exp: 09/2024   Was this medication buy and bill? No   Specialty Pharmacy: Yes   Dx:   MD Prescriber: Dr. Trammell      CPT: 39977, [id number]   Pre-medication   Acetaminophen: 1000 mg Route: PO   Famotidine 20mg Route: IVP   Diphenhydramine: 50__mg Route: _PO_   NDC#:   Lot #:   Exp:   Methylprednisone: 125__mg Route: IVP   NDC# 3570-5937-76   Lot# [id number]   Exp: 10/26     Next Appointment: [id number]

## 2024-03-23 LAB
25(OH)D3 SERPL-MCNC: 25 NG/ML
ANION GAP SERPL CALC-SCNC: 14 MMOL/L
BASOPHILS # BLD AUTO: 0 K/UL
BASOPHILS NFR BLD AUTO: 0 %
BUN SERPL-MCNC: 23 MG/DL
CALCIUM SERPL-MCNC: 9.5 MG/DL
CHLORIDE SERPL-SCNC: 99 MMOL/L
CHOLEST SERPL-MCNC: 192 MG/DL
CO2 SERPL-SCNC: 24 MMOL/L
CREAT SERPL-MCNC: 0.81 MG/DL
EOSINOPHIL # BLD AUTO: 0 K/UL
EOSINOPHIL NFR BLD AUTO: 0 %
ESTIMATED AVERAGE GLUCOSE: 85 MG/DL
FERRITIN SERPL-MCNC: 40 NG/ML
FOLATE SERPL-MCNC: 6.6 NG/ML
GLUCOSE SERPL-MCNC: 111 MG/DL
HBA1C MFR BLD HPLC: 4.6 %
HCT VFR BLD CALC: 29.5 %
HDLC SERPL-MCNC: 104 MG/DL
HGB BLD-MCNC: 8.9 G/DL
IRON SATN MFR SERPL: 13 %
IRON SERPL-MCNC: 50 UG/DL
LDLC SERPL CALC-MCNC: 76 MG/DL
LYMPHOCYTES # BLD AUTO: 0.39 K/UL
LYMPHOCYTES NFR BLD AUTO: 1.8 %
MAN DIFF?: NORMAL
MCHC RBC-ENTMCNC: 18.6 PG
MCHC RBC-ENTMCNC: 30.2 GM/DL
MCV RBC AUTO: 61.6 FL
MONOCYTES # BLD AUTO: 0.37 K/UL
MONOCYTES NFR BLD AUTO: 1.7 %
NEUTROPHILS # BLD AUTO: 20.5 K/UL
NEUTROPHILS NFR BLD AUTO: 93.9 %
NONHDLC SERPL-MCNC: 89 MG/DL
PLATELET # BLD AUTO: 264 K/UL
POTASSIUM SERPL-SCNC: 5 MMOL/L
RBC # BLD: 4.79 M/UL
RBC # FLD: 24.2 %
SODIUM SERPL-SCNC: 137 MMOL/L
T4 FREE SERPL-MCNC: 1.1 NG/DL
TIBC SERPL-MCNC: 372 UG/DL
TRIGL SERPL-MCNC: 65 MG/DL
TSH SERPL-ACNC: 0.23 UIU/ML
UIBC SERPL-MCNC: 322 UG/DL
VIT B12 SERPL-MCNC: 490 PG/ML
WBC # FLD AUTO: 21.83 K/UL

## 2024-08-21 ENCOUNTER — APPOINTMENT (OUTPATIENT)
Dept: NEUROLOGY | Facility: CLINIC | Age: 44
End: 2024-08-21

## 2024-08-21 DIAGNOSIS — G35 MULTIPLE SCLEROSIS: ICD-10-CM

## 2024-08-21 PROCEDURE — 96415 CHEMO IV INFUSION ADDL HR: CPT

## 2024-08-21 PROCEDURE — 96413 CHEMO IV INFUSION 1 HR: CPT

## 2024-08-21 RX ORDER — METHYLPREDNISOLONE 125 MG/2ML
125 INJECTION, POWDER, LYOPHILIZED, FOR SOLUTION INTRAMUSCULAR; INTRAVENOUS
Refills: 0 | Status: ACTIVE | OUTPATIENT
Start: 2024-08-21

## 2024-08-21 RX ORDER — FAMOTIDINE 20 MG/1
20 TABLET, FILM COATED ORAL
Refills: 0 | Status: ACTIVE | OUTPATIENT
Start: 2024-08-21

## 2024-08-21 NOTE — HISTORY OF PRESENT ILLNESS
[Yes] : Yes [Right upper extremity] : Right upper extremity [22g] : 22g [Start Time: ___] : Medication Start Time: [unfilled] [End Time: ___] : Medication End Time: [unfilled] [Medication Name: ___] : Medication Name: [unfilled] [Total Amount Administered: ___] : Total Amount Administered: [unfilled] [IV discontinued. Intact. No signs or symptoms of IV complications noted. Time: ___] : IV discontinued. Intact. No signs or symptoms of IV complications noted. Time: [unfilled] [Patient  instructed to seek medical attention with signs and symptoms of adverse effects] : Patient  instructed to seek medical attention with signs and symptoms of adverse effects [Patient left unit in no acute distress] : Patient left unit in no acute distress [Medications administered as ordered and tolerated well.] : Medications administered as ordered and tolerated well. [de-identified] : forearm [de-identified] : 9:16 [de-identified] :   Generic: ocrelizumab, 1mg   Dx: Multiple sclerosis, relapsing-remitting (G35)   Provider Treatment Prescriber: MAGDA STALLWORTH   Total Dose administered: 600mg in 500 mL 0.9% NS                     Start: 9:50              Stop: 13:32   Infusion Rate:  3.5-4 hr infusion=start @ 40ml/hr x 30mins, inc to 80ml/hr [id number], inc to 120ml/hr [id number], inc to 160ml/hr [id number], 200ml/hr for remainder   Amount of drug waste: none   IV insertion time:  9:16                                            IV d/c time: 13:35   NDC#: 87383-010-88                     Lot#: [id number]                   Exp: 01/2026 NDC#: 53924-266-14                     Lot#: [id number]                   Exp: 01/2026  Was this medication buy and bill?        Yes    CPT: [id number], [id number]   Pre-Medication   Acetaminophen             Total Dose administered: 650mg          route: PO               Time given: 9:18 NDC#: 2380-6371-49                                  Lot#: [id number]                                      Exp: 12/2025   Diphenhydramine billing unit 50mg      Total Dose administered: 50mg              route: PO              Time given: 9:18 NDC#:  none                                Lot#:  [id number]                                    Exp: 01/2026    Methylprednisolone billing unit 5mg:             Total Dose administered:  125mg         route: IVP                 Time given: 9:20 NDC#: 9040-6087-55                                   Lot#: [id number]                                     Exp: 11/2026    Famotidine billing unit 20mg:             Total Dose administered: 20MG          route:   PO               Time given: 9:18 NDC#: 42333-372-38                                   Lot#: [id number]                                     Exp: 7/2025    VS prior to infusion @ 9:09- HR: 71 BP: 128/73 RR: 17 Infusion began @ 9:50 (40ml/hr rate) VS @ 10:22- HR: 62 BP: 103/66 RR: 18 (rate inc to 80 ml/hr) VS @ 10:52- HR: 63 BP: 101/64 RR: 18 (rate inc to 120 ml/hr) VS @ 11:24- HR: 68 BP: 104/64 RR: 16 (rate inc to 160 ml/hr) VS @ 11:50- HR: 67 BP: 100/65 RR: 17 (rate inc to max of 200 ml/hr) VS @ 12:24- HR: 71 BP: 101/70 RR: 20 VS @ 12:57- HR: 66 BP: 100/64 RR: 18 VS @ 13:27- HR: 68 BP: 113/72 RR: 19 VS @ 1332- HR: 75 BP: 113/78 RR: 18 (infusion completed)   Was there a treatment reaction? No Action Taken: N/A   Next Appointment: 2/19/25 @ 8:00am

## 2024-09-07 ENCOUNTER — NON-APPOINTMENT (OUTPATIENT)
Age: 44
End: 2024-09-07

## 2024-12-06 NOTE — ED ADULT TRIAGE NOTE - CHIEF COMPLAINT QUOTE
Ent referral ordered. Mom informed.  
Need referral for ENT. Due to unable to complete hearing screen that was requested.   
Weakness in left leg for 5 days

## 2025-02-19 ENCOUNTER — APPOINTMENT (OUTPATIENT)
Dept: NEUROLOGY | Facility: CLINIC | Age: 45
End: 2025-02-19
Payer: COMMERCIAL

## 2025-02-19 PROCEDURE — 96413 CHEMO IV INFUSION 1 HR: CPT

## 2025-02-19 PROCEDURE — 96415 CHEMO IV INFUSION ADDL HR: CPT

## 2025-06-06 NOTE — CONSULT LETTER
We are committed to providing you the best care possible.    If you receive a survey after visiting one of our offices, please take time to share your experience concerning your physician office visit.  These surveys are confidential and no health information about you is shared.    We are eager to improve for you and continue to give you satisfactory care, we are counting on your feedback to help make that happen.         Welcome to [facility] :    Did you know we now have a faster way for you to move through your appointment? For your convenience, we now have digital registration available. When you schedule your next appointment, you will receive a link via your email as well as a text message that will allow you to complete any paperwork digitally before your appointment.    [Dear  ___] : Dear  [unfilled], [Consult Letter:] : I had the pleasure of evaluating your patient, [unfilled]. [( Thank you for referring [unfilled] for consultation for _____ )] : Thank you for referring [unfilled] for consultation for [unfilled] [Please see my note below.] : Please see my note below. [Consult Closing:] : Thank you very much for allowing me to participate in the care of this patient.  If you have any questions, please do not hesitate to contact me. [Sincerely,] : Sincerely, [FreeTextEntry3] : Don\par \par Sebastian White MD\par \par Gastroenterology\par [facility]\par [facility]\par \par

## 2025-07-28 ENCOUNTER — NON-APPOINTMENT (OUTPATIENT)
Age: 45
End: 2025-07-28

## 2025-07-29 ENCOUNTER — APPOINTMENT (OUTPATIENT)
Dept: NEUROLOGY | Facility: CLINIC | Age: 45
End: 2025-07-29
Payer: COMMERCIAL

## 2025-07-29 ENCOUNTER — LABORATORY RESULT (OUTPATIENT)
Age: 45
End: 2025-07-29

## 2025-07-29 VITALS
WEIGHT: 140 LBS | SYSTOLIC BLOOD PRESSURE: 111 MMHG | BODY MASS INDEX: 20.73 KG/M2 | HEART RATE: 88 BPM | HEIGHT: 69 IN | DIASTOLIC BLOOD PRESSURE: 77 MMHG

## 2025-07-29 DIAGNOSIS — G35 MULTIPLE SCLEROSIS: ICD-10-CM

## 2025-07-29 LAB
25(OH)D3 SERPL-MCNC: 26.1 NG/ML
ALBUMIN SERPL ELPH-MCNC: 4.9 G/DL
ALP BLD-CCNC: 79 U/L
ALT SERPL-CCNC: 22 U/L
ANION GAP SERPL CALC-SCNC: 15 MMOL/L
AST SERPL-CCNC: 19 U/L
BASOPHILS # BLD AUTO: 0.02 K/UL
BASOPHILS NFR BLD AUTO: 0.4 %
BILIRUB SERPL-MCNC: 1.6 MG/DL
BUN SERPL-MCNC: 18 MG/DL
CALCIUM SERPL-MCNC: 10.1 MG/DL
CD16+CD56+ CELLS # BLD: 310 CELLS/UL
CD16+CD56+ CELLS NFR BLD: 26 %
CD19 CELLS NFR BLD: 0 CELLS/UL
CD3 CELLS # BLD: 890 CELLS/UL
CD3 CELLS NFR BLD: 70 %
CD3+CD4+ CELLS # BLD: 492 CELLS/UL
CD3+CD4+ CELLS NFR BLD: 37 %
CD3+CD4+ CELLS/CD3+CD8+ CLL SPEC: 1.19 RATIO
CD3+CD8+ CELLS # SPEC: 414 CELLS/UL
CD3+CD8+ CELLS NFR BLD: 31 %
CELLS.CD3-CD19+/CELLS IN BLOOD: <1 %
CHLORIDE SERPL-SCNC: 104 MMOL/L
CO2 SERPL-SCNC: 21 MMOL/L
CREAT SERPL-MCNC: 1.07 MG/DL
DEPRECATED KAPPA LC FREE/LAMBDA SER: 1.16 RATIO
EGFRCR SERPLBLD CKD-EPI 2021: 87 ML/MIN/1.73M2
EOSINOPHIL # BLD AUTO: 0.04 K/UL
EOSINOPHIL NFR BLD AUTO: 0.7 %
GLUCOSE SERPL-MCNC: 97 MG/DL
HCT VFR BLD CALC: 42.4 %
HGB BLD-MCNC: 12.7 G/DL
IGA SERPL-MCNC: 83 MG/DL
IGG SERPL-MCNC: 1007 MG/DL
IGM SERPL-MCNC: 153 MG/DL
IMM GRANULOCYTES NFR BLD AUTO: 0.2 %
KAPPA LC CSF-MCNC: 1.04 MG/DL
KAPPA LC SERPL-MCNC: 1.21 MG/DL
LYMPHOCYTES # BLD AUTO: 1.2 K/UL
LYMPHOCYTES NFR BLD AUTO: 21.4 %
MAN DIFF?: NORMAL
MCHC RBC-ENTMCNC: 19.1 PG
MCHC RBC-ENTMCNC: 30 G/DL
MCV RBC AUTO: 63.9 FL
MONOCYTES # BLD AUTO: 0.4 K/UL
MONOCYTES NFR BLD AUTO: 7.1 %
NEUTROPHILS # BLD AUTO: 3.95 K/UL
NEUTROPHILS NFR BLD AUTO: 70.2 %
PLATELET # BLD AUTO: 157 K/UL
POTASSIUM SERPL-SCNC: 4.8 MMOL/L
PROT SERPL-MCNC: 6.9 G/DL
RBC # BLD: 6.64 M/UL
RBC # FLD: 17.4 %
SODIUM SERPL-SCNC: 141 MMOL/L
VIABILITY: NORMAL
WBC # FLD AUTO: 5.62 K/UL

## 2025-07-29 PROCEDURE — G2211 COMPLEX E/M VISIT ADD ON: CPT | Mod: NC

## 2025-07-29 PROCEDURE — 99214 OFFICE O/P EST MOD 30 MIN: CPT

## 2025-08-12 ENCOUNTER — OUTPATIENT (OUTPATIENT)
Dept: OUTPATIENT SERVICES | Facility: HOSPITAL | Age: 45
LOS: 1 days | End: 2025-08-12
Payer: COMMERCIAL

## 2025-08-12 ENCOUNTER — APPOINTMENT (OUTPATIENT)
Dept: MRI IMAGING | Facility: CLINIC | Age: 45
End: 2025-08-12
Payer: COMMERCIAL

## 2025-08-12 DIAGNOSIS — G35 MULTIPLE SCLEROSIS: ICD-10-CM

## 2025-08-12 PROCEDURE — 72141 MRI NECK SPINE W/O DYE: CPT

## 2025-08-12 PROCEDURE — 72141 MRI NECK SPINE W/O DYE: CPT | Mod: 26

## 2025-08-12 PROCEDURE — 72146 MRI CHEST SPINE W/O DYE: CPT | Mod: 26

## 2025-08-12 PROCEDURE — 76377 3D RENDER W/INTRP POSTPROCES: CPT | Mod: 26

## 2025-08-12 PROCEDURE — 70551 MRI BRAIN STEM W/O DYE: CPT

## 2025-08-12 PROCEDURE — 70551 MRI BRAIN STEM W/O DYE: CPT | Mod: 26

## 2025-08-12 PROCEDURE — 72146 MRI CHEST SPINE W/O DYE: CPT

## 2025-08-12 PROCEDURE — 76377 3D RENDER W/INTRP POSTPROCES: CPT

## 2025-08-19 ENCOUNTER — NON-APPOINTMENT (OUTPATIENT)
Age: 45
End: 2025-08-19

## 2025-08-20 ENCOUNTER — APPOINTMENT (OUTPATIENT)
Dept: NEUROLOGY | Facility: CLINIC | Age: 45
End: 2025-08-20
Payer: COMMERCIAL

## 2025-08-20 PROCEDURE — 96415 CHEMO IV INFUSION ADDL HR: CPT

## 2025-08-20 PROCEDURE — 96413 CHEMO IV INFUSION 1 HR: CPT

## 2025-09-01 DIAGNOSIS — G35 MULTIPLE SCLEROSIS: ICD-10-CM

## 2025-09-01 DIAGNOSIS — D32.0 BENIGN NEOPLASM OF CEREBRAL MENINGES: ICD-10-CM

## 2025-09-02 ENCOUNTER — TRANSCRIPTION ENCOUNTER (OUTPATIENT)
Age: 45
End: 2025-09-02

## 2025-09-03 ENCOUNTER — TRANSCRIPTION ENCOUNTER (OUTPATIENT)
Age: 45
End: 2025-09-03